# Patient Record
Sex: FEMALE | Race: WHITE | Employment: OTHER | URBAN - METROPOLITAN AREA
[De-identification: names, ages, dates, MRNs, and addresses within clinical notes are randomized per-mention and may not be internally consistent; named-entity substitution may affect disease eponyms.]

---

## 2017-03-02 ENCOUNTER — HOSPITAL ENCOUNTER (OUTPATIENT)
Dept: WOMENS IMAGING | Age: 62
Discharge: HOME | End: 2017-03-02
Payer: MEDICARE

## 2017-03-02 DIAGNOSIS — Z12.39 BREAST CANCER SCREENING: ICD-10-CM

## 2017-03-02 PROCEDURE — G0202 SCR MAMMO BI INCL CAD: HCPCS

## 2017-03-28 ENCOUNTER — HOSPITAL ENCOUNTER (OUTPATIENT)
Age: 62
Discharge: HOME | End: 2017-03-28
Payer: MEDICARE

## 2017-03-28 DIAGNOSIS — G62.9 NEUROPATHY: ICD-10-CM

## 2017-03-28 DIAGNOSIS — I10 ESSENTIAL HYPERTENSION: ICD-10-CM

## 2017-03-28 DIAGNOSIS — N18.30 CKD (CHRONIC KIDNEY DISEASE) STAGE 3, GFR 30-59 ML/MIN (HCC): ICD-10-CM

## 2017-03-28 LAB
ABSOLUTE EOS #: 0.2 K/UL (ref 0–0.4)
ABSOLUTE LYMPH #: 1.6 K/UL (ref 1–4.8)
ABSOLUTE MONO #: 0.4 K/UL (ref 0.1–1.3)
ANION GAP SERPL CALCULATED.3IONS-SCNC: 11 MMOL/L (ref 9–17)
ANION GAP SERPL CALCULATED.3IONS-SCNC: 11 MMOL/L (ref 9–17)
BASOPHILS # BLD: 1 % (ref 0–2)
BASOPHILS ABSOLUTE: 0.1 K/UL (ref 0–0.2)
BUN BLDV-MCNC: 23 MG/DL (ref 8–23)
BUN BLDV-MCNC: 23 MG/DL (ref 8–23)
BUN/CREAT BLD: ABNORMAL (ref 9–20)
BUN/CREAT BLD: ABNORMAL (ref 9–20)
CALCIUM SERPL-MCNC: 9.9 MG/DL (ref 8.6–10.4)
CALCIUM SERPL-MCNC: 9.9 MG/DL (ref 8.6–10.4)
CHLORIDE BLD-SCNC: 100 MMOL/L (ref 98–107)
CHLORIDE BLD-SCNC: 100 MMOL/L (ref 98–107)
CO2: 30 MMOL/L (ref 20–31)
CO2: 30 MMOL/L (ref 20–31)
CREAT SERPL-MCNC: 1.39 MG/DL (ref 0.5–0.9)
CREAT SERPL-MCNC: 1.39 MG/DL (ref 0.5–0.9)
DIFFERENTIAL TYPE: ABNORMAL
EOSINOPHILS RELATIVE PERCENT: 3 % (ref 0–4)
GFR AFRICAN AMERICAN: 47 ML/MIN
GFR AFRICAN AMERICAN: 47 ML/MIN
GFR NON-AFRICAN AMERICAN: 39 ML/MIN
GFR NON-AFRICAN AMERICAN: 39 ML/MIN
GFR SERPL CREATININE-BSD FRML MDRD: ABNORMAL ML/MIN/{1.73_M2}
GLUCOSE BLD-MCNC: 91 MG/DL (ref 70–99)
GLUCOSE BLD-MCNC: 91 MG/DL (ref 70–99)
HCT VFR BLD CALC: 41.4 % (ref 36–46)
HEMOGLOBIN: 13.4 G/DL (ref 12–16)
LYMPHOCYTES # BLD: 24 % (ref 24–44)
MAGNESIUM: 1.8 MG/DL (ref 1.6–2.6)
MCH RBC QN AUTO: 27.4 PG (ref 26–34)
MCHC RBC AUTO-ENTMCNC: 32.3 G/DL (ref 31–37)
MCV RBC AUTO: 84.9 FL (ref 80–100)
MONOCYTES # BLD: 6 % (ref 1–7)
PDW BLD-RTO: 15.6 % (ref 11.5–14.9)
PHOSPHORUS: 2.9 MG/DL (ref 2.6–4.5)
PLATELET # BLD: 182 K/UL (ref 150–450)
PLATELET ESTIMATE: ABNORMAL
PMV BLD AUTO: 8.8 FL (ref 6–12)
POTASSIUM SERPL-SCNC: 5.2 MMOL/L (ref 3.7–5.3)
POTASSIUM SERPL-SCNC: 5.2 MMOL/L (ref 3.7–5.3)
RBC # BLD: 4.88 M/UL (ref 4–5.2)
RBC # BLD: ABNORMAL 10*6/UL
SEG NEUTROPHILS: 66 % (ref 36–66)
SEGMENTED NEUTROPHILS ABSOLUTE COUNT: 4.4 K/UL (ref 1.3–9.1)
SODIUM BLD-SCNC: 141 MMOL/L (ref 135–144)
SODIUM BLD-SCNC: 141 MMOL/L (ref 135–144)
WBC # BLD: 6.6 K/UL (ref 3.5–11)
WBC # BLD: ABNORMAL 10*3/UL

## 2017-03-28 PROCEDURE — 80048 BASIC METABOLIC PNL TOTAL CA: CPT

## 2017-03-28 PROCEDURE — 85025 COMPLETE CBC W/AUTO DIFF WBC: CPT

## 2017-03-28 PROCEDURE — 36415 COLL VENOUS BLD VENIPUNCTURE: CPT

## 2017-03-28 PROCEDURE — 83735 ASSAY OF MAGNESIUM: CPT

## 2017-03-28 PROCEDURE — 84100 ASSAY OF PHOSPHORUS: CPT

## 2017-04-07 ENCOUNTER — HOSPITAL ENCOUNTER (OUTPATIENT)
Age: 62
Discharge: HOME | End: 2017-04-07
Payer: MEDICARE

## 2017-04-07 ENCOUNTER — OFFICE VISIT (OUTPATIENT)
Dept: OBGYN CLINIC | Age: 62
End: 2017-04-07
Payer: MEDICARE

## 2017-04-07 VITALS
RESPIRATION RATE: 16 BRPM | HEIGHT: 67 IN | HEART RATE: 78 BPM | SYSTOLIC BLOOD PRESSURE: 132 MMHG | WEIGHT: 270.95 LBS | DIASTOLIC BLOOD PRESSURE: 86 MMHG | BODY MASS INDEX: 42.53 KG/M2

## 2017-04-07 DIAGNOSIS — N95.0 PMB (POSTMENOPAUSAL BLEEDING): ICD-10-CM

## 2017-04-07 DIAGNOSIS — Z11.51 SPECIAL SCREENING EXAMINATION FOR HUMAN PAPILLOMAVIRUS (HPV): ICD-10-CM

## 2017-04-07 DIAGNOSIS — Z01.419 WELL WOMAN EXAM WITH ROUTINE GYNECOLOGICAL EXAM: Primary | ICD-10-CM

## 2017-04-07 DIAGNOSIS — N76.0 ACUTE VAGINITIS: ICD-10-CM

## 2017-04-07 LAB
DIRECT EXAM: ABNORMAL
ESTRADIOL LEVEL: 7 PG/ML
FOLLICLE STIMULATING HORMONE: 60.6 U/L (ref 25.8–134.8)
Lab: ABNORMAL
SPECIMEN DESCRIPTION: ABNORMAL
SPECIMEN DESCRIPTION: ABNORMAL
STATUS: ABNORMAL

## 2017-04-07 PROCEDURE — 83001 ASSAY OF GONADOTROPIN (FSH): CPT

## 2017-04-07 PROCEDURE — G0145 SCR C/V CYTO,THINLAYER,RESCR: HCPCS

## 2017-04-07 PROCEDURE — 36415 COLL VENOUS BLD VENIPUNCTURE: CPT

## 2017-04-07 PROCEDURE — 87510 GARDNER VAG DNA DIR PROBE: CPT

## 2017-04-07 PROCEDURE — 99396 PREV VISIT EST AGE 40-64: CPT | Performed by: NURSE PRACTITIONER

## 2017-04-07 PROCEDURE — 87480 CANDIDA DNA DIR PROBE: CPT

## 2017-04-07 PROCEDURE — 87660 TRICHOMONAS VAGIN DIR PROBE: CPT

## 2017-04-07 PROCEDURE — 82670 ASSAY OF TOTAL ESTRADIOL: CPT

## 2017-04-07 PROCEDURE — 87624 HPV HI-RISK TYP POOLED RSLT: CPT

## 2017-04-07 RX ORDER — CLOTRIMAZOLE AND BETAMETHASONE DIPROPIONATE 10; .64 MG/G; MG/G
CREAM TOPICAL
Qty: 1 TUBE | Refills: 1 | Status: SHIPPED | OUTPATIENT
Start: 2017-04-07 | End: 2018-01-03 | Stop reason: ALTCHOICE

## 2017-04-07 ASSESSMENT — PATIENT HEALTH QUESTIONNAIRE - PHQ9
2. FEELING DOWN, DEPRESSED OR HOPELESS: 0
SUM OF ALL RESPONSES TO PHQ QUESTIONS 1-9: 0
1. LITTLE INTEREST OR PLEASURE IN DOING THINGS: 0
SUM OF ALL RESPONSES TO PHQ9 QUESTIONS 1 & 2: 0

## 2017-04-10 LAB
HPV SAMPLE: NORMAL
HPV SOURCE: NORMAL
HPV, GENOTYPE 16: NOT DETECTED
HPV, GENOTYPE 18: NOT DETECTED
HPV, HIGH RISK OTHER: NOT DETECTED
HPV, INTERPRETATION: NORMAL

## 2017-04-10 RX ORDER — FLUCONAZOLE 150 MG/1
150 TABLET ORAL ONCE
Qty: 2 TABLET | Refills: 0 | Status: SHIPPED | OUTPATIENT
Start: 2017-04-10 | End: 2017-04-10

## 2017-04-10 RX ORDER — METRONIDAZOLE 500 MG/1
500 TABLET ORAL 2 TIMES DAILY
Qty: 14 TABLET | Refills: 0 | Status: SHIPPED | OUTPATIENT
Start: 2017-04-10 | End: 2017-04-17

## 2017-04-11 LAB — CYTOLOGY REPORT: NORMAL

## 2017-04-12 ENCOUNTER — HOSPITAL ENCOUNTER (OUTPATIENT)
Dept: ULTRASOUND IMAGING | Age: 62
Discharge: HOME | End: 2017-04-12
Payer: MEDICARE

## 2017-04-12 DIAGNOSIS — N95.0 PMB (POSTMENOPAUSAL BLEEDING): ICD-10-CM

## 2017-04-12 DIAGNOSIS — N18.30 CKD (CHRONIC KIDNEY DISEASE) STAGE 3, GFR 30-59 ML/MIN (HCC): ICD-10-CM

## 2017-04-12 DIAGNOSIS — I12.9 BENIGN HYPERTENSION WITH CKD (CHRONIC KIDNEY DISEASE) STAGE III (HCC): ICD-10-CM

## 2017-04-12 DIAGNOSIS — N18.30 BENIGN HYPERTENSION WITH CKD (CHRONIC KIDNEY DISEASE) STAGE III (HCC): ICD-10-CM

## 2017-04-12 PROCEDURE — 76775 US EXAM ABDO BACK WALL LIM: CPT

## 2017-04-12 PROCEDURE — 76856 US EXAM PELVIC COMPLETE: CPT

## 2017-04-12 PROCEDURE — 76830 TRANSVAGINAL US NON-OB: CPT

## 2017-04-17 ENCOUNTER — HOSPITAL ENCOUNTER (OUTPATIENT)
Age: 62
Discharge: HOME | End: 2017-04-17
Payer: MEDICARE

## 2017-04-17 DIAGNOSIS — I12.9 BENIGN HYPERTENSION WITH CKD (CHRONIC KIDNEY DISEASE) STAGE III (HCC): ICD-10-CM

## 2017-04-17 DIAGNOSIS — N18.30 BENIGN HYPERTENSION WITH CKD (CHRONIC KIDNEY DISEASE) STAGE III (HCC): ICD-10-CM

## 2017-04-17 DIAGNOSIS — N18.30 CKD (CHRONIC KIDNEY DISEASE) STAGE 3, GFR 30-59 ML/MIN (HCC): ICD-10-CM

## 2017-04-17 LAB
ANION GAP SERPL CALCULATED.3IONS-SCNC: 11 MMOL/L (ref 9–17)
CHLORIDE BLD-SCNC: 100 MMOL/L (ref 98–107)
CO2: 29 MMOL/L (ref 20–31)
POTASSIUM SERPL-SCNC: 4.5 MMOL/L (ref 3.7–5.3)
SODIUM BLD-SCNC: 140 MMOL/L (ref 135–144)

## 2017-04-17 PROCEDURE — 80051 ELECTROLYTE PANEL: CPT

## 2017-04-17 PROCEDURE — 36415 COLL VENOUS BLD VENIPUNCTURE: CPT

## 2017-05-11 ENCOUNTER — OFFICE VISIT (OUTPATIENT)
Dept: OBGYN CLINIC | Age: 62
End: 2017-05-11
Payer: MEDICARE

## 2017-05-11 VITALS
WEIGHT: 264 LBS | SYSTOLIC BLOOD PRESSURE: 128 MMHG | HEIGHT: 67 IN | BODY MASS INDEX: 41.44 KG/M2 | DIASTOLIC BLOOD PRESSURE: 82 MMHG | HEART RATE: 78 BPM

## 2017-05-11 DIAGNOSIS — Z98.51 S/P TUBAL LIGATION: ICD-10-CM

## 2017-05-11 DIAGNOSIS — N95.0 PMB (POSTMENOPAUSAL BLEEDING): Primary | ICD-10-CM

## 2017-05-11 DIAGNOSIS — I10 ESSENTIAL HYPERTENSION: ICD-10-CM

## 2017-05-11 DIAGNOSIS — R93.89 ENDOMETRIAL THICKENING ON ULTRA SOUND: ICD-10-CM

## 2017-05-11 DIAGNOSIS — Z13.1 DM (DIABETES MELLITUS SCREEN): ICD-10-CM

## 2017-05-11 DIAGNOSIS — I25.2 HISTORY OF MYOCARDIAL INFARCTION: ICD-10-CM

## 2017-05-11 PROCEDURE — 99213 OFFICE O/P EST LOW 20 MIN: CPT | Performed by: OBSTETRICS & GYNECOLOGY

## 2017-06-08 ENCOUNTER — OFFICE VISIT (OUTPATIENT)
Dept: INTERNAL MEDICINE CLINIC | Age: 62
End: 2017-06-08
Payer: MEDICARE

## 2017-06-08 VITALS
HEART RATE: 79 BPM | DIASTOLIC BLOOD PRESSURE: 70 MMHG | HEIGHT: 67 IN | BODY MASS INDEX: 40.02 KG/M2 | WEIGHT: 255 LBS | SYSTOLIC BLOOD PRESSURE: 114 MMHG

## 2017-06-08 DIAGNOSIS — E11.42 DIABETIC POLYNEUROPATHY ASSOCIATED WITH TYPE 2 DIABETES MELLITUS (HCC): ICD-10-CM

## 2017-06-08 DIAGNOSIS — E66.01 MORBID OBESITY DUE TO EXCESS CALORIES (HCC): ICD-10-CM

## 2017-06-08 DIAGNOSIS — M54.41 CHRONIC MIDLINE LOW BACK PAIN WITH RIGHT-SIDED SCIATICA: ICD-10-CM

## 2017-06-08 DIAGNOSIS — Z98.61 CAD S/P PERCUTANEOUS CORONARY ANGIOPLASTY: ICD-10-CM

## 2017-06-08 DIAGNOSIS — G89.29 CHRONIC MIDLINE LOW BACK PAIN WITH RIGHT-SIDED SCIATICA: ICD-10-CM

## 2017-06-08 DIAGNOSIS — N18.30 CKD (CHRONIC KIDNEY DISEASE) STAGE 3, GFR 30-59 ML/MIN (HCC): ICD-10-CM

## 2017-06-08 DIAGNOSIS — I10 ESSENTIAL HYPERTENSION: Primary | ICD-10-CM

## 2017-06-08 DIAGNOSIS — Z13.9 SCREENING: ICD-10-CM

## 2017-06-08 DIAGNOSIS — I25.10 CAD S/P PERCUTANEOUS CORONARY ANGIOPLASTY: ICD-10-CM

## 2017-06-08 DIAGNOSIS — E11.9 TYPE 2 DIABETES MELLITUS WITHOUT COMPLICATION, WITHOUT LONG-TERM CURRENT USE OF INSULIN (HCC): ICD-10-CM

## 2017-06-08 PROCEDURE — 99204 OFFICE O/P NEW MOD 45 MIN: CPT | Performed by: INTERNAL MEDICINE

## 2017-06-08 RX ORDER — OXYCODONE HYDROCHLORIDE 10 MG/1
10 TABLET ORAL
COMMUNITY
Start: 2017-05-15 | End: 2017-06-08 | Stop reason: SDUPTHER

## 2017-06-08 RX ORDER — METOPROLOL TARTRATE 50 MG/1
TABLET, FILM COATED ORAL
COMMUNITY
End: 2017-06-08 | Stop reason: SDUPTHER

## 2017-06-08 RX ORDER — FENTANYL 50 UG/H
1 PATCH TRANSDERMAL
COMMUNITY
Start: 2017-05-15 | End: 2017-07-13 | Stop reason: ALTCHOICE

## 2017-06-08 RX ORDER — CALCIUM CITRATE/VITAMIN D3 200MG-6.25
TABLET ORAL
COMMUNITY
Start: 2017-05-31 | End: 2019-05-20

## 2017-06-08 RX ORDER — GLUCOSAM/CHON-MSM1/C/MANG/BOSW 500-416.6
TABLET ORAL
COMMUNITY
Start: 2017-04-10 | End: 2019-05-20

## 2017-06-08 RX ORDER — VALSARTAN AND HYDROCHLOROTHIAZIDE 160; 25 MG/1; MG/1
0.5 TABLET ORAL
COMMUNITY
End: 2017-06-08 | Stop reason: SDUPTHER

## 2017-06-08 RX ORDER — DULOXETIN HYDROCHLORIDE 30 MG/1
CAPSULE, DELAYED RELEASE ORAL
COMMUNITY
Start: 2017-05-22 | End: 2017-06-08 | Stop reason: SDUPTHER

## 2017-06-08 ASSESSMENT — PATIENT HEALTH QUESTIONNAIRE - PHQ9
SUM OF ALL RESPONSES TO PHQ9 QUESTIONS 1 & 2: 0
1. LITTLE INTEREST OR PLEASURE IN DOING THINGS: 0
SUM OF ALL RESPONSES TO PHQ QUESTIONS 1-9: 0
2. FEELING DOWN, DEPRESSED OR HOPELESS: 0

## 2017-06-08 ASSESSMENT — ENCOUNTER SYMPTOMS
APNEA: 0
CONSTIPATION: 0
SHORTNESS OF BREATH: 0
COLOR CHANGE: 0
DIARRHEA: 0
EYE ITCHING: 0
CHEST TIGHTNESS: 0
EYE DISCHARGE: 0
EYE PAIN: 0
EYE REDNESS: 0
CHOKING: 0
BACK PAIN: 0
COUGH: 0
ANAL BLEEDING: 0
BLOOD IN STOOL: 0
ABDOMINAL DISTENTION: 0

## 2017-06-29 ENCOUNTER — PREP FOR PROCEDURE (OUTPATIENT)
Dept: OBGYN CLINIC | Age: 62
End: 2017-06-29

## 2017-06-29 ENCOUNTER — OFFICE VISIT (OUTPATIENT)
Dept: OBGYN CLINIC | Age: 62
End: 2017-06-29
Payer: MEDICARE

## 2017-06-29 VITALS
BODY MASS INDEX: 41.44 KG/M2 | SYSTOLIC BLOOD PRESSURE: 122 MMHG | HEART RATE: 78 BPM | DIASTOLIC BLOOD PRESSURE: 74 MMHG | WEIGHT: 264 LBS | HEIGHT: 67 IN

## 2017-06-29 DIAGNOSIS — I25.2 HISTORY OF MYOCARDIAL INFARCTION: ICD-10-CM

## 2017-06-29 DIAGNOSIS — R93.89 ENDOMETRIAL THICKENING ON ULTRA SOUND: ICD-10-CM

## 2017-06-29 DIAGNOSIS — Z01.818 PREOP TESTING: Primary | ICD-10-CM

## 2017-06-29 DIAGNOSIS — Z13.1 DM (DIABETES MELLITUS SCREEN): ICD-10-CM

## 2017-06-29 DIAGNOSIS — I10 ESSENTIAL HYPERTENSION: ICD-10-CM

## 2017-06-29 DIAGNOSIS — N95.0 PMB (POSTMENOPAUSAL BLEEDING): Primary | ICD-10-CM

## 2017-06-29 DIAGNOSIS — Z98.51 S/P TUBAL LIGATION: ICD-10-CM

## 2017-06-29 DIAGNOSIS — I25.10 CAD S/P PERCUTANEOUS CORONARY ANGIOPLASTY: ICD-10-CM

## 2017-06-29 DIAGNOSIS — Z98.61 CAD S/P PERCUTANEOUS CORONARY ANGIOPLASTY: ICD-10-CM

## 2017-06-29 PROCEDURE — 99213 OFFICE O/P EST LOW 20 MIN: CPT | Performed by: OBSTETRICS & GYNECOLOGY

## 2017-06-29 RX ORDER — SODIUM CHLORIDE, SODIUM LACTATE, POTASSIUM CHLORIDE, CALCIUM CHLORIDE 600; 310; 30; 20 MG/100ML; MG/100ML; MG/100ML; MG/100ML
INJECTION, SOLUTION INTRAVENOUS CONTINUOUS
Status: CANCELLED | OUTPATIENT
Start: 2017-06-29

## 2017-06-29 RX ORDER — SODIUM CHLORIDE 0.9 % (FLUSH) 0.9 %
10 SYRINGE (ML) INJECTION EVERY 12 HOURS SCHEDULED
Status: CANCELLED | OUTPATIENT
Start: 2017-06-29

## 2017-06-29 RX ORDER — SODIUM CHLORIDE 0.9 % (FLUSH) 0.9 %
10 SYRINGE (ML) INJECTION PRN
Status: CANCELLED | OUTPATIENT
Start: 2017-06-29

## 2017-07-11 ENCOUNTER — HOSPITAL ENCOUNTER (OUTPATIENT)
Age: 62
Discharge: HOME | End: 2017-07-11
Payer: MEDICARE

## 2017-07-11 DIAGNOSIS — N18.30 BENIGN HYPERTENSION WITH CKD (CHRONIC KIDNEY DISEASE) STAGE III (HCC): ICD-10-CM

## 2017-07-11 DIAGNOSIS — N18.30 CKD (CHRONIC KIDNEY DISEASE) STAGE 3, GFR 30-59 ML/MIN (HCC): ICD-10-CM

## 2017-07-11 DIAGNOSIS — I12.9 BENIGN HYPERTENSION WITH CKD (CHRONIC KIDNEY DISEASE) STAGE III (HCC): ICD-10-CM

## 2017-07-11 LAB
ABSOLUTE EOS #: 0.2 K/UL (ref 0–0.4)
ABSOLUTE LYMPH #: 1.7 K/UL (ref 1–4.8)
ABSOLUTE MONO #: 0.5 K/UL (ref 0.1–1.3)
ANION GAP SERPL CALCULATED.3IONS-SCNC: 14 MMOL/L (ref 9–17)
BASOPHILS # BLD: 1 %
BASOPHILS ABSOLUTE: 0.1 K/UL (ref 0–0.2)
BUN BLDV-MCNC: 22 MG/DL (ref 8–23)
BUN/CREAT BLD: ABNORMAL (ref 9–20)
CALCIUM SERPL-MCNC: 10.3 MG/DL (ref 8.6–10.4)
CHLORIDE BLD-SCNC: 99 MMOL/L (ref 98–107)
CO2: 26 MMOL/L (ref 20–31)
CREAT SERPL-MCNC: 1.42 MG/DL (ref 0.5–0.9)
CREATININE URINE: 306.8 MG/DL (ref 28–217)
DIFFERENTIAL TYPE: NORMAL
EOSINOPHILS RELATIVE PERCENT: 3 %
FREE KAPPA/LAMBDA RATIO: 1.73 (ref 0.26–1.65)
GFR AFRICAN AMERICAN: 46 ML/MIN
GFR NON-AFRICAN AMERICAN: 38 ML/MIN
GFR SERPL CREATININE-BSD FRML MDRD: ABNORMAL ML/MIN/{1.73_M2}
GFR SERPL CREATININE-BSD FRML MDRD: ABNORMAL ML/MIN/{1.73_M2}
GLUCOSE BLD-MCNC: 117 MG/DL (ref 70–99)
HCT VFR BLD CALC: 40.5 % (ref 36–46)
HEMOGLOBIN: 13.2 G/DL (ref 12–16)
KAPPA FREE LIGHT CHAINS QNT: 3.53 MG/DL (ref 0.37–1.94)
LAMBDA FREE LIGHT CHAINS QNT: 2.04 MG/DL (ref 0.57–2.63)
LYMPHOCYTES # BLD: 24 %
MAGNESIUM: 2 MG/DL (ref 1.6–2.6)
MCH RBC QN AUTO: 29.1 PG (ref 26–34)
MCHC RBC AUTO-ENTMCNC: 32.5 G/DL (ref 31–37)
MCV RBC AUTO: 89.6 FL (ref 80–100)
MONOCYTES # BLD: 7 %
PDW BLD-RTO: 14.7 % (ref 11.5–14.9)
PHOSPHORUS: 3.1 MG/DL (ref 2.6–4.5)
PLATELET # BLD: 208 K/UL (ref 150–450)
PLATELET ESTIMATE: NORMAL
PMV BLD AUTO: 9 FL (ref 6–12)
POTASSIUM SERPL-SCNC: 4 MMOL/L (ref 3.7–5.3)
RBC # BLD: 4.52 M/UL (ref 4–5.2)
RBC # BLD: NORMAL 10*6/UL
SEG NEUTROPHILS: 65 %
SEGMENTED NEUTROPHILS ABSOLUTE COUNT: 4.7 K/UL (ref 1.3–9.1)
SODIUM BLD-SCNC: 139 MMOL/L (ref 135–144)
TOTAL PROTEIN, URINE: 41 MG/DL
URINE TOTAL PROTEIN CREATININE RATIO: 0.13 (ref 0–0.2)
WBC # BLD: 7.2 K/UL (ref 3.5–11)
WBC # BLD: NORMAL 10*3/UL

## 2017-07-11 PROCEDURE — 86235 NUCLEAR ANTIGEN ANTIBODY: CPT

## 2017-07-11 PROCEDURE — 86225 DNA ANTIBODY NATIVE: CPT

## 2017-07-11 PROCEDURE — 82570 ASSAY OF URINE CREATININE: CPT

## 2017-07-11 PROCEDURE — 80048 BASIC METABOLIC PNL TOTAL CA: CPT

## 2017-07-11 PROCEDURE — 83883 ASSAY NEPHELOMETRY NOT SPEC: CPT

## 2017-07-11 PROCEDURE — 84165 PROTEIN E-PHORESIS SERUM: CPT

## 2017-07-11 PROCEDURE — 84156 ASSAY OF PROTEIN URINE: CPT

## 2017-07-11 PROCEDURE — 85025 COMPLETE CBC W/AUTO DIFF WBC: CPT

## 2017-07-11 PROCEDURE — 84100 ASSAY OF PHOSPHORUS: CPT

## 2017-07-11 PROCEDURE — 36415 COLL VENOUS BLD VENIPUNCTURE: CPT

## 2017-07-11 PROCEDURE — 86038 ANTINUCLEAR ANTIBODIES: CPT

## 2017-07-11 PROCEDURE — 83735 ASSAY OF MAGNESIUM: CPT

## 2017-07-11 PROCEDURE — 84155 ASSAY OF PROTEIN SERUM: CPT

## 2017-07-12 LAB
ANA REFERENCE RANGE:: ABNORMAL
ANTI DNA DOUBLE STRANDED: 8 IU/ML
ANTI JO-1 IGG: 7 U/ML
ANTI RNP AB: 9 U/ML
ANTI SSA: 37 U/ML
ANTI SSB: 4 U/ML
ANTI-CENTROMERE: 6 U/ML
ANTI-NUCLEAR ANTIBODY (ANA): POSITIVE
ANTI-SCLERODERMA: 136 U/ML
ANTI-SMITH: 10 U/ML
HISTONE ANTIBODY: 8 U/ML

## 2017-07-13 ENCOUNTER — HOSPITAL ENCOUNTER (OUTPATIENT)
Dept: PREADMISSION TESTING | Age: 62
Discharge: HOME | End: 2017-07-13
Payer: MEDICARE

## 2017-07-13 VITALS
TEMPERATURE: 96.4 F | HEART RATE: 54 BPM | DIASTOLIC BLOOD PRESSURE: 68 MMHG | WEIGHT: 252 LBS | HEIGHT: 67 IN | RESPIRATION RATE: 18 BRPM | BODY MASS INDEX: 39.55 KG/M2 | OXYGEN SATURATION: 100 % | SYSTOLIC BLOOD PRESSURE: 106 MMHG

## 2017-07-13 DIAGNOSIS — Z01.818 PREOP TESTING: ICD-10-CM

## 2017-07-13 LAB
-: ABNORMAL
ABO/RH: NORMAL
ABSOLUTE EOS #: 0.3 K/UL (ref 0–0.4)
ABSOLUTE LYMPH #: 1.9 K/UL (ref 1–4.8)
ABSOLUTE MONO #: 0.6 K/UL (ref 0.1–1.3)
ALBUMIN (CALCULATED): 4.5 G/DL (ref 3.2–5.2)
ALBUMIN PERCENT: 65 % (ref 45–65)
ALPHA 1 PERCENT: 2 % (ref 3–6)
ALPHA 2 PERCENT: 10 % (ref 6–13)
ALPHA-1-GLOBULIN: 0.2 G/DL (ref 0.1–0.4)
ALPHA-2-GLOBULIN: 0.7 G/DL (ref 0.5–0.9)
AMORPHOUS: ABNORMAL
ANION GAP SERPL CALCULATED.3IONS-SCNC: 11 MMOL/L (ref 9–17)
ANTIBODY SCREEN: NEGATIVE
ARM BAND NUMBER: NORMAL
BACTERIA: ABNORMAL
BASOPHILS # BLD: 1 %
BASOPHILS ABSOLUTE: 0.1 K/UL (ref 0–0.2)
BETA GLOBULIN: 0.7 G/DL (ref 0.5–1.1)
BETA PERCENT: 11 % (ref 11–19)
BILIRUBIN URINE: ABNORMAL
BUN BLDV-MCNC: 23 MG/DL (ref 8–23)
BUN/CREAT BLD: ABNORMAL (ref 9–20)
CALCIUM SERPL-MCNC: 10.1 MG/DL (ref 8.6–10.4)
CASTS UA: ABNORMAL /LPF
CHLORIDE BLD-SCNC: 101 MMOL/L (ref 98–107)
CO2: 29 MMOL/L (ref 20–31)
COLOR: ABNORMAL
COMMENT UA: ABNORMAL
CREAT SERPL-MCNC: 1.36 MG/DL (ref 0.5–0.9)
CRYSTALS, UA: ABNORMAL /HPF
DIFFERENTIAL TYPE: ABNORMAL
EOSINOPHILS RELATIVE PERCENT: 3 %
EPITHELIAL CELLS UA: ABNORMAL /HPF
EXPIRATION DATE: NORMAL
GAMMA GLOBULIN %: 13 % (ref 9–20)
GAMMA GLOBULIN: 0.9 G/DL (ref 0.5–1.5)
GFR AFRICAN AMERICAN: 48 ML/MIN
GFR NON-AFRICAN AMERICAN: 40 ML/MIN
GFR SERPL CREATININE-BSD FRML MDRD: ABNORMAL ML/MIN/{1.73_M2}
GFR SERPL CREATININE-BSD FRML MDRD: ABNORMAL ML/MIN/{1.73_M2}
GLUCOSE BLD-MCNC: 105 MG/DL (ref 70–99)
GLUCOSE URINE: NEGATIVE
HCG QUANTITATIVE: 5 IU/L
HCT VFR BLD CALC: 39.8 % (ref 36–46)
HEMOGLOBIN: 13 G/DL (ref 12–16)
KETONES, URINE: ABNORMAL
LEUKOCYTE ESTERASE, URINE: ABNORMAL
LYMPHOCYTES # BLD: 24 %
MCH RBC QN AUTO: 29.2 PG (ref 26–34)
MCHC RBC AUTO-ENTMCNC: 32.6 G/DL (ref 31–37)
MCV RBC AUTO: 89.6 FL (ref 80–100)
MONOCYTES # BLD: 7 %
MUCUS: ABNORMAL
NITRITE, URINE: NEGATIVE
OTHER OBSERVATIONS UA: ABNORMAL
PATHOLOGIST: ABNORMAL
PDW BLD-RTO: 15.2 % (ref 11.5–14.9)
PH UA: 5 (ref 5–8)
PLATELET # BLD: 227 K/UL (ref 150–450)
PLATELET ESTIMATE: ABNORMAL
PMV BLD AUTO: 9.3 FL (ref 6–12)
POTASSIUM SERPL-SCNC: 4.4 MMOL/L (ref 3.7–5.3)
PROTEIN ELECTROPHORESIS, SERUM: ABNORMAL
PROTEIN UA: ABNORMAL
RBC # BLD: 4.44 M/UL (ref 4–5.2)
RBC # BLD: ABNORMAL 10*6/UL
RBC UA: ABNORMAL /HPF
RENAL EPITHELIAL, UA: ABNORMAL /HPF
SEG NEUTROPHILS: 65 %
SEGMENTED NEUTROPHILS ABSOLUTE COUNT: 5.2 K/UL (ref 1.3–9.1)
SODIUM BLD-SCNC: 141 MMOL/L (ref 135–144)
SPECIFIC GRAVITY UA: 1.03 (ref 1–1.03)
TOTAL PROT. SUM,%: 101 % (ref 98–102)
TOTAL PROT. SUM: 7 G/DL (ref 6.3–8.2)
TOTAL PROTEIN: 7 G/DL (ref 6.4–8.3)
TRICHOMONAS: ABNORMAL
TURBIDITY: ABNORMAL
URINE HGB: NEGATIVE
UROBILINOGEN, URINE: NORMAL
WBC # BLD: 7.9 K/UL (ref 3.5–11)
WBC # BLD: ABNORMAL 10*3/UL
WBC UA: ABNORMAL /HPF
YEAST: ABNORMAL

## 2017-07-13 PROCEDURE — 87086 URINE CULTURE/COLONY COUNT: CPT

## 2017-07-13 PROCEDURE — 80048 BASIC METABOLIC PNL TOTAL CA: CPT

## 2017-07-13 PROCEDURE — 36415 COLL VENOUS BLD VENIPUNCTURE: CPT

## 2017-07-13 PROCEDURE — 87077 CULTURE AEROBIC IDENTIFY: CPT

## 2017-07-13 PROCEDURE — 86901 BLOOD TYPING SEROLOGIC RH(D): CPT

## 2017-07-13 PROCEDURE — 84702 CHORIONIC GONADOTROPIN TEST: CPT

## 2017-07-13 PROCEDURE — 86900 BLOOD TYPING SEROLOGIC ABO: CPT

## 2017-07-13 PROCEDURE — 81001 URINALYSIS AUTO W/SCOPE: CPT

## 2017-07-13 PROCEDURE — 86850 RBC ANTIBODY SCREEN: CPT

## 2017-07-13 PROCEDURE — 87186 SC STD MICRODIL/AGAR DIL: CPT

## 2017-07-13 PROCEDURE — 85025 COMPLETE CBC W/AUTO DIFF WBC: CPT

## 2017-07-14 ENCOUNTER — TELEPHONE (OUTPATIENT)
Dept: OBGYN CLINIC | Age: 62
End: 2017-07-14

## 2017-07-14 ENCOUNTER — ANESTHESIA EVENT (OUTPATIENT)
Dept: OPERATING ROOM | Age: 62
End: 2017-07-14
Payer: MEDICARE

## 2017-07-14 RX ORDER — CLINDAMYCIN HYDROCHLORIDE 300 MG/1
300 CAPSULE ORAL 3 TIMES DAILY
Qty: 30 CAPSULE | Refills: 0 | Status: ON HOLD | OUTPATIENT
Start: 2017-07-14 | End: 2017-07-17 | Stop reason: HOSPADM

## 2017-07-14 RX ORDER — SODIUM CHLORIDE 0.9 % (FLUSH) 0.9 %
10 SYRINGE (ML) INJECTION PRN
Status: CANCELLED | OUTPATIENT
Start: 2017-07-14

## 2017-07-14 RX ORDER — SODIUM CHLORIDE 9 MG/ML
INJECTION, SOLUTION INTRAVENOUS CONTINUOUS
Status: CANCELLED | OUTPATIENT
Start: 2017-07-14

## 2017-07-14 RX ORDER — SODIUM CHLORIDE, SODIUM LACTATE, POTASSIUM CHLORIDE, CALCIUM CHLORIDE 600; 310; 30; 20 MG/100ML; MG/100ML; MG/100ML; MG/100ML
INJECTION, SOLUTION INTRAVENOUS CONTINUOUS
Status: CANCELLED | OUTPATIENT
Start: 2017-07-14

## 2017-07-14 RX ORDER — SODIUM CHLORIDE 0.9 % (FLUSH) 0.9 %
10 SYRINGE (ML) INJECTION EVERY 12 HOURS SCHEDULED
Status: CANCELLED | OUTPATIENT
Start: 2017-07-14

## 2017-07-14 RX ORDER — LIDOCAINE HYDROCHLORIDE 10 MG/ML
1 INJECTION, SOLUTION EPIDURAL; INFILTRATION; INTRACAUDAL; PERINEURAL
Status: CANCELLED | OUTPATIENT
Start: 2017-07-14 | End: 2017-07-14

## 2017-07-15 LAB
CULTURE: ABNORMAL
CULTURE: ABNORMAL
Lab: ABNORMAL
ORGANISM: ABNORMAL
SPECIMEN DESCRIPTION: ABNORMAL
SPECIMEN DESCRIPTION: ABNORMAL
STATUS: ABNORMAL

## 2017-07-17 ENCOUNTER — TELEPHONE (OUTPATIENT)
Dept: OBGYN CLINIC | Age: 62
End: 2017-07-17

## 2017-07-17 ENCOUNTER — HOSPITAL ENCOUNTER (OUTPATIENT)
Age: 62
Setting detail: OUTPATIENT SURGERY
Discharge: HOME | End: 2017-07-17
Attending: OBSTETRICS & GYNECOLOGY | Admitting: OBSTETRICS & GYNECOLOGY
Payer: MEDICARE

## 2017-07-17 ENCOUNTER — ANESTHESIA (OUTPATIENT)
Dept: OPERATING ROOM | Age: 62
End: 2017-07-17
Payer: MEDICARE

## 2017-07-17 VITALS
OXYGEN SATURATION: 98 % | HEART RATE: 64 BPM | HEIGHT: 67 IN | TEMPERATURE: 97 F | SYSTOLIC BLOOD PRESSURE: 119 MMHG | WEIGHT: 252 LBS | RESPIRATION RATE: 12 BRPM | BODY MASS INDEX: 39.55 KG/M2 | DIASTOLIC BLOOD PRESSURE: 72 MMHG

## 2017-07-17 VITALS — SYSTOLIC BLOOD PRESSURE: 102 MMHG | OXYGEN SATURATION: 99 % | DIASTOLIC BLOOD PRESSURE: 58 MMHG

## 2017-07-17 PROBLEM — Z98.890 STATUS POST HYSTEROSCOPY: Status: ACTIVE | Noted: 2017-07-17

## 2017-07-17 PROBLEM — N39.0 UTI (URINARY TRACT INFECTION): Status: ACTIVE | Noted: 2017-07-17

## 2017-07-17 PROBLEM — D25.9 FIBROID UTERUS: Status: ACTIVE | Noted: 2017-07-17

## 2017-07-17 LAB — GLUCOSE BLD-MCNC: 105 MG/DL (ref 65–105)

## 2017-07-17 PROCEDURE — 58120 DILATION AND CURETTAGE: CPT | Performed by: OBSTETRICS & GYNECOLOGY

## 2017-07-17 PROCEDURE — 58561 HYSTEROSCOPY REMOVE MYOMA: CPT | Performed by: OBSTETRICS & GYNECOLOGY

## 2017-07-17 PROCEDURE — 3600000012 HC SURGERY LEVEL 2 ADDTL 15MIN: Performed by: OBSTETRICS & GYNECOLOGY

## 2017-07-17 PROCEDURE — 2500000003 HC RX 250 WO HCPCS: Performed by: NURSE ANESTHETIST, CERTIFIED REGISTERED

## 2017-07-17 PROCEDURE — 88305 TISSUE EXAM BY PATHOLOGIST: CPT

## 2017-07-17 PROCEDURE — 3700000001 HC ADD 15 MINUTES (ANESTHESIA): Performed by: OBSTETRICS & GYNECOLOGY

## 2017-07-17 PROCEDURE — 7100000030 HC ASPR PHASE II RECOVERY - FIRST 15 MIN: Performed by: OBSTETRICS & GYNECOLOGY

## 2017-07-17 PROCEDURE — 2720000010 HC SURG SUPPLY STERILE: Performed by: OBSTETRICS & GYNECOLOGY

## 2017-07-17 PROCEDURE — 2580000003 HC RX 258: Performed by: ANESTHESIOLOGY

## 2017-07-17 PROCEDURE — 3700000000 HC ANESTHESIA ATTENDED CARE: Performed by: OBSTETRICS & GYNECOLOGY

## 2017-07-17 PROCEDURE — 7100000031 HC ASPR PHASE II RECOVERY - ADDTL 15 MIN: Performed by: OBSTETRICS & GYNECOLOGY

## 2017-07-17 PROCEDURE — 2580000003 HC RX 258: Performed by: OBSTETRICS & GYNECOLOGY

## 2017-07-17 PROCEDURE — 7100000001 HC PACU RECOVERY - ADDTL 15 MIN: Performed by: OBSTETRICS & GYNECOLOGY

## 2017-07-17 PROCEDURE — 7100000000 HC PACU RECOVERY - FIRST 15 MIN: Performed by: OBSTETRICS & GYNECOLOGY

## 2017-07-17 PROCEDURE — 6360000002 HC RX W HCPCS: Performed by: NURSE ANESTHETIST, CERTIFIED REGISTERED

## 2017-07-17 PROCEDURE — 3600000002 HC SURGERY LEVEL 2 BASE: Performed by: OBSTETRICS & GYNECOLOGY

## 2017-07-17 PROCEDURE — 82947 ASSAY GLUCOSE BLOOD QUANT: CPT

## 2017-07-17 PROCEDURE — 2500000003 HC RX 250 WO HCPCS: Performed by: OBSTETRICS & GYNECOLOGY

## 2017-07-17 RX ORDER — DEXAMETHASONE SODIUM PHOSPHATE 4 MG/ML
INJECTION, SOLUTION INTRA-ARTICULAR; INTRALESIONAL; INTRAMUSCULAR; INTRAVENOUS; SOFT TISSUE PRN
Status: DISCONTINUED | OUTPATIENT
Start: 2017-07-17 | End: 2017-07-17 | Stop reason: SDUPTHER

## 2017-07-17 RX ORDER — MORPHINE SULFATE 2 MG/ML
1 INJECTION, SOLUTION INTRAMUSCULAR; INTRAVENOUS EVERY 5 MIN PRN
Status: DISCONTINUED | OUTPATIENT
Start: 2017-07-17 | End: 2017-07-17 | Stop reason: HOSPADM

## 2017-07-17 RX ORDER — MIDAZOLAM HYDROCHLORIDE 1 MG/ML
INJECTION INTRAMUSCULAR; INTRAVENOUS PRN
Status: DISCONTINUED | OUTPATIENT
Start: 2017-07-17 | End: 2017-07-17 | Stop reason: SDUPTHER

## 2017-07-17 RX ORDER — PROPOFOL 10 MG/ML
INJECTION, EMULSION INTRAVENOUS PRN
Status: DISCONTINUED | OUTPATIENT
Start: 2017-07-17 | End: 2017-07-17 | Stop reason: SDUPTHER

## 2017-07-17 RX ORDER — LABETALOL HYDROCHLORIDE 5 MG/ML
5 INJECTION, SOLUTION INTRAVENOUS EVERY 10 MIN PRN
Status: DISCONTINUED | OUTPATIENT
Start: 2017-07-17 | End: 2017-07-17 | Stop reason: HOSPADM

## 2017-07-17 RX ORDER — SODIUM CHLORIDE 0.9 % (FLUSH) 0.9 %
10 SYRINGE (ML) INJECTION PRN
Status: DISCONTINUED | OUTPATIENT
Start: 2017-07-17 | End: 2017-07-17 | Stop reason: HOSPADM

## 2017-07-17 RX ORDER — CLINDAMYCIN PHOSPHATE 150 MG/ML
INJECTION, SOLUTION INTRAVENOUS
Status: DISCONTINUED
Start: 2017-07-17 | End: 2017-07-17 | Stop reason: HOSPADM

## 2017-07-17 RX ORDER — CIPROFLOXACIN 500 MG/1
500 TABLET, FILM COATED ORAL 2 TIMES DAILY
Qty: 14 TABLET | Refills: 0 | Status: SHIPPED | OUTPATIENT
Start: 2017-07-17 | End: 2017-07-19 | Stop reason: ALTCHOICE

## 2017-07-17 RX ORDER — FENTANYL CITRATE 50 UG/ML
25 INJECTION, SOLUTION INTRAMUSCULAR; INTRAVENOUS EVERY 5 MIN PRN
Status: DISCONTINUED | OUTPATIENT
Start: 2017-07-17 | End: 2017-07-17 | Stop reason: HOSPADM

## 2017-07-17 RX ORDER — ONDANSETRON 2 MG/ML
INJECTION INTRAMUSCULAR; INTRAVENOUS PRN
Status: DISCONTINUED | OUTPATIENT
Start: 2017-07-17 | End: 2017-07-17 | Stop reason: SDUPTHER

## 2017-07-17 RX ORDER — SODIUM CHLORIDE 0.9 % (FLUSH) 0.9 %
10 SYRINGE (ML) INJECTION EVERY 12 HOURS SCHEDULED
Status: DISCONTINUED | OUTPATIENT
Start: 2017-07-17 | End: 2017-07-17 | Stop reason: HOSPADM

## 2017-07-17 RX ORDER — CIPROFLOXACIN 500 MG/1
500 TABLET, FILM COATED ORAL 2 TIMES DAILY
Qty: 20 TABLET | Refills: 0 | Status: SHIPPED | OUTPATIENT
Start: 2017-07-17 | End: 2017-07-27

## 2017-07-17 RX ORDER — FENTANYL CITRATE 50 UG/ML
INJECTION, SOLUTION INTRAMUSCULAR; INTRAVENOUS PRN
Status: DISCONTINUED | OUTPATIENT
Start: 2017-07-17 | End: 2017-07-17 | Stop reason: SDUPTHER

## 2017-07-17 RX ORDER — SODIUM CHLORIDE, SODIUM LACTATE, POTASSIUM CHLORIDE, CALCIUM CHLORIDE 600; 310; 30; 20 MG/100ML; MG/100ML; MG/100ML; MG/100ML
INJECTION, SOLUTION INTRAVENOUS CONTINUOUS
Status: DISCONTINUED | OUTPATIENT
Start: 2017-07-17 | End: 2017-07-17 | Stop reason: HOSPADM

## 2017-07-17 RX ORDER — METOCLOPRAMIDE HYDROCHLORIDE 5 MG/ML
10 INJECTION INTRAMUSCULAR; INTRAVENOUS
Status: DISCONTINUED | OUTPATIENT
Start: 2017-07-17 | End: 2017-07-17 | Stop reason: HOSPADM

## 2017-07-17 RX ORDER — DIPHENHYDRAMINE HYDROCHLORIDE 50 MG/ML
12.5 INJECTION INTRAMUSCULAR; INTRAVENOUS
Status: DISCONTINUED | OUTPATIENT
Start: 2017-07-17 | End: 2017-07-17 | Stop reason: HOSPADM

## 2017-07-17 RX ORDER — MEPERIDINE HYDROCHLORIDE 25 MG/ML
12.5 INJECTION INTRAMUSCULAR; INTRAVENOUS; SUBCUTANEOUS EVERY 5 MIN PRN
Status: DISCONTINUED | OUTPATIENT
Start: 2017-07-17 | End: 2017-07-17 | Stop reason: HOSPADM

## 2017-07-17 RX ORDER — LIDOCAINE HYDROCHLORIDE 10 MG/ML
INJECTION, SOLUTION EPIDURAL; INFILTRATION; INTRACAUDAL; PERINEURAL PRN
Status: DISCONTINUED | OUTPATIENT
Start: 2017-07-17 | End: 2017-07-17 | Stop reason: SDUPTHER

## 2017-07-17 RX ORDER — HYDROCODONE BITARTRATE AND ACETAMINOPHEN 5; 325 MG/1; MG/1
1 TABLET ORAL EVERY 6 HOURS PRN
Qty: 15 TABLET | Refills: 0 | Status: SHIPPED | OUTPATIENT
Start: 2017-07-17 | End: 2017-07-24

## 2017-07-17 RX ORDER — FENTANYL CITRATE 50 UG/ML
50 INJECTION, SOLUTION INTRAMUSCULAR; INTRAVENOUS EVERY 5 MIN PRN
Status: DISCONTINUED | OUTPATIENT
Start: 2017-07-17 | End: 2017-07-17 | Stop reason: HOSPADM

## 2017-07-17 RX ORDER — LIDOCAINE HYDROCHLORIDE 10 MG/ML
1 INJECTION, SOLUTION EPIDURAL; INFILTRATION; INTRACAUDAL; PERINEURAL
Status: DISCONTINUED | OUTPATIENT
Start: 2017-07-17 | End: 2017-07-17 | Stop reason: HOSPADM

## 2017-07-17 RX ORDER — HYDRALAZINE HYDROCHLORIDE 20 MG/ML
5 INJECTION INTRAMUSCULAR; INTRAVENOUS EVERY 10 MIN PRN
Status: DISCONTINUED | OUTPATIENT
Start: 2017-07-17 | End: 2017-07-17 | Stop reason: HOSPADM

## 2017-07-17 RX ORDER — ONDANSETRON 2 MG/ML
4 INJECTION INTRAMUSCULAR; INTRAVENOUS
Status: DISCONTINUED | OUTPATIENT
Start: 2017-07-17 | End: 2017-07-17 | Stop reason: HOSPADM

## 2017-07-17 RX ORDER — SODIUM CHLORIDE 9 MG/ML
INJECTION, SOLUTION INTRAVENOUS CONTINUOUS
Status: DISCONTINUED | OUTPATIENT
Start: 2017-07-17 | End: 2017-07-17 | Stop reason: HOSPADM

## 2017-07-17 RX ADMIN — FENTANYL CITRATE 50 MCG: 50 INJECTION, SOLUTION INTRAMUSCULAR; INTRAVENOUS at 11:34

## 2017-07-17 RX ADMIN — LIDOCAINE HYDROCHLORIDE 50 MG: 10 INJECTION, SOLUTION EPIDURAL; INFILTRATION; INTRACAUDAL; PERINEURAL at 11:10

## 2017-07-17 RX ADMIN — ONDANSETRON 4 MG: 2 INJECTION INTRAMUSCULAR; INTRAVENOUS at 11:29

## 2017-07-17 RX ADMIN — DEXTROSE 900 MG: 50 INJECTION, SOLUTION INTRAVENOUS at 11:21

## 2017-07-17 RX ADMIN — SODIUM CHLORIDE: 9 INJECTION, SOLUTION INTRAVENOUS at 09:01

## 2017-07-17 RX ADMIN — DEXAMETHASONE SODIUM PHOSPHATE 4 MG: 4 INJECTION, SOLUTION INTRAMUSCULAR; INTRAVENOUS at 11:29

## 2017-07-17 RX ADMIN — PROPOFOL 200 MG: 10 INJECTION, EMULSION INTRAVENOUS at 11:10

## 2017-07-17 RX ADMIN — SODIUM CHLORIDE: 9 INJECTION, SOLUTION INTRAVENOUS at 11:05

## 2017-07-17 RX ADMIN — FENTANYL CITRATE 50 MCG: 50 INJECTION, SOLUTION INTRAMUSCULAR; INTRAVENOUS at 11:18

## 2017-07-17 RX ADMIN — MIDAZOLAM 2 MG: 1 INJECTION INTRAMUSCULAR; INTRAVENOUS at 11:05

## 2017-07-17 ASSESSMENT — PAIN - FUNCTIONAL ASSESSMENT: PAIN_FUNCTIONAL_ASSESSMENT: 0-10

## 2017-07-17 ASSESSMENT — PAIN DESCRIPTION - ORIENTATION
ORIENTATION: LOWER
ORIENTATION: LOWER

## 2017-07-17 ASSESSMENT — PAIN DESCRIPTION - LOCATION
LOCATION: ABDOMEN
LOCATION: ABDOMEN

## 2017-07-17 ASSESSMENT — PAIN DESCRIPTION - DESCRIPTORS
DESCRIPTORS: CRAMPING
DESCRIPTORS: CRAMPING

## 2017-07-17 ASSESSMENT — PAIN SCALES - GENERAL
PAINLEVEL_OUTOF10: 0
PAINLEVEL_OUTOF10: 2
PAINLEVEL_OUTOF10: 2

## 2017-07-17 ASSESSMENT — PAIN DESCRIPTION - PAIN TYPE
TYPE: SURGICAL PAIN
TYPE: SURGICAL PAIN

## 2017-07-19 ENCOUNTER — TELEPHONE (OUTPATIENT)
Dept: OBGYN CLINIC | Age: 62
End: 2017-07-19

## 2017-07-20 LAB — SURGICAL PATHOLOGY REPORT: NORMAL

## 2017-09-07 ENCOUNTER — TELEPHONE (OUTPATIENT)
Dept: INTERNAL MEDICINE CLINIC | Age: 62
End: 2017-09-07

## 2017-09-19 ENCOUNTER — OFFICE VISIT (OUTPATIENT)
Dept: OBGYN CLINIC | Age: 62
End: 2017-09-19
Payer: MEDICARE

## 2017-09-19 VITALS
DIASTOLIC BLOOD PRESSURE: 82 MMHG | HEART RATE: 78 BPM | SYSTOLIC BLOOD PRESSURE: 125 MMHG | BODY MASS INDEX: 39.24 KG/M2 | HEIGHT: 67 IN | WEIGHT: 250 LBS

## 2017-09-19 DIAGNOSIS — N85.2 UTERINE HYPERPLASIA: ICD-10-CM

## 2017-09-19 DIAGNOSIS — N95.0 PMB (POSTMENOPAUSAL BLEEDING): Primary | ICD-10-CM

## 2017-09-19 DIAGNOSIS — Z98.890 STATUS POST HYSTEROSCOPY: ICD-10-CM

## 2017-09-19 DIAGNOSIS — Z96.652 HISTORY OF TOTAL LEFT KNEE REPLACEMENT: ICD-10-CM

## 2017-09-19 DIAGNOSIS — Z98.890 H/O DILATION AND CURETTAGE: ICD-10-CM

## 2017-09-19 PROCEDURE — 99214 OFFICE O/P EST MOD 30 MIN: CPT | Performed by: OBSTETRICS & GYNECOLOGY

## 2017-11-13 LAB
AVERAGE GLUCOSE: NORMAL
HBA1C MFR BLD: 5.5 %

## 2017-11-16 ENCOUNTER — TELEPHONE (OUTPATIENT)
Dept: INTERNAL MEDICINE CLINIC | Age: 62
End: 2017-11-16

## 2018-01-03 ENCOUNTER — PREP FOR PROCEDURE (OUTPATIENT)
Dept: OBGYN CLINIC | Age: 63
End: 2018-01-03

## 2018-01-03 ENCOUNTER — HOSPITAL ENCOUNTER (OUTPATIENT)
Dept: PREADMISSION TESTING | Age: 63
Discharge: HOME | End: 2018-01-03
Payer: MEDICARE

## 2018-01-03 ENCOUNTER — HOSPITAL ENCOUNTER (OUTPATIENT)
Dept: GENERAL RADIOLOGY | Age: 63
Discharge: HOME | End: 2018-01-03
Payer: MEDICARE

## 2018-01-03 ENCOUNTER — OFFICE VISIT (OUTPATIENT)
Dept: OBGYN CLINIC | Age: 63
End: 2018-01-03
Payer: MEDICARE

## 2018-01-03 VITALS
HEIGHT: 67 IN | HEART RATE: 60 BPM | SYSTOLIC BLOOD PRESSURE: 114 MMHG | TEMPERATURE: 97.9 F | WEIGHT: 245 LBS | OXYGEN SATURATION: 99 % | BODY MASS INDEX: 38.45 KG/M2 | DIASTOLIC BLOOD PRESSURE: 67 MMHG | RESPIRATION RATE: 16 BRPM

## 2018-01-03 VITALS
BODY MASS INDEX: 38.77 KG/M2 | HEIGHT: 67 IN | SYSTOLIC BLOOD PRESSURE: 130 MMHG | DIASTOLIC BLOOD PRESSURE: 84 MMHG | WEIGHT: 247 LBS | HEART RATE: 82 BPM | RESPIRATION RATE: 18 BRPM

## 2018-01-03 DIAGNOSIS — Z98.61 CAD S/P PERCUTANEOUS CORONARY ANGIOPLASTY: ICD-10-CM

## 2018-01-03 DIAGNOSIS — I25.2 HISTORY OF MYOCARDIAL INFARCTION: ICD-10-CM

## 2018-01-03 DIAGNOSIS — Z98.890 STATUS POST HYSTEROSCOPY: ICD-10-CM

## 2018-01-03 DIAGNOSIS — Z01.818 PREOP TESTING: Primary | ICD-10-CM

## 2018-01-03 DIAGNOSIS — E11.9 TYPE 2 DIABETES MELLITUS WITHOUT COMPLICATION, WITHOUT LONG-TERM CURRENT USE OF INSULIN (HCC): ICD-10-CM

## 2018-01-03 DIAGNOSIS — I25.10 CAD S/P PERCUTANEOUS CORONARY ANGIOPLASTY: ICD-10-CM

## 2018-01-03 DIAGNOSIS — Z01.818 PREOP TESTING: ICD-10-CM

## 2018-01-03 DIAGNOSIS — N85.2 UTERINE HYPERPLASIA: ICD-10-CM

## 2018-01-03 DIAGNOSIS — N95.0 PMB (POSTMENOPAUSAL BLEEDING): Primary | ICD-10-CM

## 2018-01-03 DIAGNOSIS — Z98.51 S/P TUBAL LIGATION: ICD-10-CM

## 2018-01-03 DIAGNOSIS — I10 ESSENTIAL HYPERTENSION: ICD-10-CM

## 2018-01-03 DIAGNOSIS — Z96.652 HISTORY OF TOTAL LEFT KNEE REPLACEMENT: ICD-10-CM

## 2018-01-03 LAB
-: NORMAL
ABO/RH: NORMAL
ABSOLUTE EOS #: 0.3 K/UL (ref 0–0.4)
ABSOLUTE IMMATURE GRANULOCYTE: ABNORMAL K/UL (ref 0–0.3)
ABSOLUTE LYMPH #: 1.4 K/UL (ref 1–4.8)
ABSOLUTE MONO #: 0.4 K/UL (ref 0.1–1.3)
AMORPHOUS: NORMAL
ANION GAP SERPL CALCULATED.3IONS-SCNC: 11 MMOL/L (ref 9–17)
ANTIBODY SCREEN: NEGATIVE
ARM BAND NUMBER: NORMAL
BACTERIA: NORMAL
BASOPHILS # BLD: 1 % (ref 0–2)
BASOPHILS ABSOLUTE: 0.1 K/UL (ref 0–0.2)
BILIRUBIN URINE: NEGATIVE
BUN BLDV-MCNC: 14 MG/DL (ref 8–23)
BUN/CREAT BLD: ABNORMAL (ref 9–20)
CALCIUM SERPL-MCNC: 9.8 MG/DL (ref 8.6–10.4)
CASTS UA: NORMAL /LPF
CHLORIDE BLD-SCNC: 102 MMOL/L (ref 98–107)
CO2: 28 MMOL/L (ref 20–31)
COLOR: YELLOW
COMMENT UA: ABNORMAL
CREAT SERPL-MCNC: 1.07 MG/DL (ref 0.5–0.9)
CRYSTALS, UA: NORMAL /HPF
DIFFERENTIAL TYPE: ABNORMAL
EKG ATRIAL RATE: 57 BPM
EKG P AXIS: 29 DEGREES
EKG P-R INTERVAL: 140 MS
EKG Q-T INTERVAL: 448 MS
EKG QRS DURATION: 90 MS
EKG QTC CALCULATION (BAZETT): 436 MS
EKG R AXIS: 32 DEGREES
EKG T AXIS: 40 DEGREES
EKG VENTRICULAR RATE: 57 BPM
EOSINOPHILS RELATIVE PERCENT: 3 % (ref 0–4)
EPITHELIAL CELLS UA: NORMAL /HPF
EXPIRATION DATE: NORMAL
GFR AFRICAN AMERICAN: >60 ML/MIN
GFR NON-AFRICAN AMERICAN: 52 ML/MIN
GFR SERPL CREATININE-BSD FRML MDRD: ABNORMAL ML/MIN/{1.73_M2}
GFR SERPL CREATININE-BSD FRML MDRD: ABNORMAL ML/MIN/{1.73_M2}
GLUCOSE BLD-MCNC: 143 MG/DL (ref 70–99)
GLUCOSE URINE: NEGATIVE
HCT VFR BLD CALC: 39.9 % (ref 36–46)
HEMOGLOBIN: 12.7 G/DL (ref 12–16)
IMMATURE GRANULOCYTES: ABNORMAL %
INR BLD: 1
KETONES, URINE: NEGATIVE
LEUKOCYTE ESTERASE, URINE: ABNORMAL
LYMPHOCYTES # BLD: 18 % (ref 24–44)
MCH RBC QN AUTO: 27.3 PG (ref 26–34)
MCHC RBC AUTO-ENTMCNC: 31.9 G/DL (ref 31–37)
MCV RBC AUTO: 85.6 FL (ref 80–100)
MONOCYTES # BLD: 5 % (ref 1–7)
MUCUS: NORMAL
NITRITE, URINE: NEGATIVE
OTHER OBSERVATIONS UA: NORMAL
PARTIAL THROMBOPLASTIN TIME: 25.7 SEC (ref 23–31)
PDW BLD-RTO: 15.4 % (ref 11.5–14.9)
PH UA: 6 (ref 5–8)
PLATELET # BLD: 197 K/UL (ref 150–450)
PLATELET ESTIMATE: ABNORMAL
PMV BLD AUTO: 9 FL (ref 6–12)
POTASSIUM SERPL-SCNC: 4.2 MMOL/L (ref 3.7–5.3)
PROTEIN UA: NEGATIVE
PROTHROMBIN TIME: 10.9 SEC (ref 9.7–12)
RBC # BLD: 4.66 M/UL (ref 4–5.2)
RBC # BLD: ABNORMAL 10*6/UL
RBC UA: NORMAL /HPF
RENAL EPITHELIAL, UA: NORMAL /HPF
SEG NEUTROPHILS: 73 % (ref 36–66)
SEGMENTED NEUTROPHILS ABSOLUTE COUNT: 5.8 K/UL (ref 1.3–9.1)
SODIUM BLD-SCNC: 141 MMOL/L (ref 135–144)
SPECIFIC GRAVITY UA: 1.01 (ref 1–1.03)
TRICHOMONAS: NORMAL
TURBIDITY: CLEAR
URINE HGB: NEGATIVE
UROBILINOGEN, URINE: NORMAL
WBC # BLD: 8 K/UL (ref 3.5–11)
WBC # BLD: ABNORMAL 10*3/UL
WBC UA: NORMAL /HPF
YEAST: NORMAL

## 2018-01-03 PROCEDURE — 81001 URINALYSIS AUTO W/SCOPE: CPT

## 2018-01-03 PROCEDURE — 85025 COMPLETE CBC W/AUTO DIFF WBC: CPT

## 2018-01-03 PROCEDURE — 71046 X-RAY EXAM CHEST 2 VIEWS: CPT

## 2018-01-03 PROCEDURE — G8484 FLU IMMUNIZE NO ADMIN: HCPCS | Performed by: OBSTETRICS & GYNECOLOGY

## 2018-01-03 PROCEDURE — G8598 ASA/ANTIPLAT THER USED: HCPCS | Performed by: OBSTETRICS & GYNECOLOGY

## 2018-01-03 PROCEDURE — G8427 DOCREV CUR MEDS BY ELIG CLIN: HCPCS | Performed by: OBSTETRICS & GYNECOLOGY

## 2018-01-03 PROCEDURE — 86900 BLOOD TYPING SEROLOGIC ABO: CPT

## 2018-01-03 PROCEDURE — 99213 OFFICE O/P EST LOW 20 MIN: CPT | Performed by: OBSTETRICS & GYNECOLOGY

## 2018-01-03 PROCEDURE — 86901 BLOOD TYPING SEROLOGIC RH(D): CPT

## 2018-01-03 PROCEDURE — 36415 COLL VENOUS BLD VENIPUNCTURE: CPT

## 2018-01-03 PROCEDURE — 80048 BASIC METABOLIC PNL TOTAL CA: CPT

## 2018-01-03 PROCEDURE — 3017F COLORECTAL CA SCREEN DOC REV: CPT | Performed by: OBSTETRICS & GYNECOLOGY

## 2018-01-03 PROCEDURE — 85610 PROTHROMBIN TIME: CPT

## 2018-01-03 PROCEDURE — G8417 CALC BMI ABV UP PARAM F/U: HCPCS | Performed by: OBSTETRICS & GYNECOLOGY

## 2018-01-03 PROCEDURE — 85730 THROMBOPLASTIN TIME PARTIAL: CPT

## 2018-01-03 PROCEDURE — 86850 RBC ANTIBODY SCREEN: CPT

## 2018-01-03 PROCEDURE — 87086 URINE CULTURE/COLONY COUNT: CPT

## 2018-01-03 PROCEDURE — 93005 ELECTROCARDIOGRAM TRACING: CPT

## 2018-01-03 PROCEDURE — 3046F HEMOGLOBIN A1C LEVEL >9.0%: CPT | Performed by: OBSTETRICS & GYNECOLOGY

## 2018-01-03 PROCEDURE — 1036F TOBACCO NON-USER: CPT | Performed by: OBSTETRICS & GYNECOLOGY

## 2018-01-03 PROCEDURE — 3014F SCREEN MAMMO DOC REV: CPT | Performed by: OBSTETRICS & GYNECOLOGY

## 2018-01-03 RX ORDER — SODIUM CHLORIDE 0.9 % (FLUSH) 0.9 %
10 SYRINGE (ML) INJECTION EVERY 12 HOURS SCHEDULED
Status: CANCELLED | OUTPATIENT
Start: 2018-01-03

## 2018-01-03 RX ORDER — TIZANIDINE 4 MG/1
4 TABLET ORAL EVERY 8 HOURS PRN
COMMUNITY
End: 2019-09-18 | Stop reason: SDUPTHER

## 2018-01-03 RX ORDER — SODIUM CHLORIDE, SODIUM LACTATE, POTASSIUM CHLORIDE, CALCIUM CHLORIDE 600; 310; 30; 20 MG/100ML; MG/100ML; MG/100ML; MG/100ML
INJECTION, SOLUTION INTRAVENOUS CONTINUOUS
Status: CANCELLED | OUTPATIENT
Start: 2018-01-03

## 2018-01-03 RX ORDER — HYDROCODONE BITARTRATE AND ACETAMINOPHEN 5; 325 MG/1; MG/1
1 TABLET ORAL 2 TIMES DAILY PRN
Status: ON HOLD | COMMUNITY
End: 2018-01-10 | Stop reason: HOSPADM

## 2018-01-03 RX ORDER — SODIUM CHLORIDE 0.9 % (FLUSH) 0.9 %
10 SYRINGE (ML) INJECTION PRN
Status: CANCELLED | OUTPATIENT
Start: 2018-01-03

## 2018-01-03 ASSESSMENT — PAIN DESCRIPTION - FREQUENCY: FREQUENCY: CONTINUOUS

## 2018-01-03 ASSESSMENT — PAIN DESCRIPTION - DESCRIPTORS: DESCRIPTORS: ACHING;CONSTANT

## 2018-01-03 ASSESSMENT — PAIN DESCRIPTION - PROGRESSION: CLINICAL_PROGRESSION: NOT CHANGED

## 2018-01-03 ASSESSMENT — PAIN DESCRIPTION - PAIN TYPE: TYPE: CHRONIC PAIN

## 2018-01-03 ASSESSMENT — PAIN DESCRIPTION - LOCATION: LOCATION: BACK

## 2018-01-03 ASSESSMENT — PAIN DESCRIPTION - ONSET: ONSET: ON-GOING

## 2018-01-03 ASSESSMENT — PAIN DESCRIPTION - ORIENTATION: ORIENTATION: LOWER

## 2018-01-03 ASSESSMENT — PAIN SCALES - GENERAL: PAINLEVEL_OUTOF10: 5

## 2018-01-03 NOTE — PROGRESS NOTES
[facility]  [address]; [address]  [place], [address] [place] [address]  [phone] mn [phone] Fax        Rufino Yeager  1955  Primary Care Physician: Akira Chavarria MD        [address] [place] [address] Street: [facility]      1/3/2018  MEDICAID CONSENT COMPLETED: No      HPI: Rufino Yeager is a 58 y.o. female   Pt with continued PMB. She had a D&C Hscope Myosure 2017. Pathology was consistent with simple and complex hyperplasia without atypia. She completed a Left total Knee in the post op window and missed her post op appt. She was offered Progesterone for 3-6 months and re-sample but declined. RBA reviewed. She is on Plavix and a baby ASA. She stopped bleeding then restarted spotting. Her pap 2017 was normal with negative hpv. She wishes fu D&C Hysteroscopy . Pending findings possible hysterectomy vs expectant fu. Relevant Past History:   Patient Active Problem List    Diagnosis Date Noted    Uterine hyperplasia Simple and complex WITHOUT ATYPIA (2017) 2017     Priority: High     Discussed progesterone therapy and progression risk to Uterine cancer. Pt declined and medical intervention or hysterectomy due to medical co-morbidities. Plan repeat sampling of uterus in 6-9 months to re-evaluate.       History of total left knee replacement 2017     Priority: High    PMB (postmenopausal bleeding) 2013     Priority: High    History of myocardial infarction      Priority: High     STENTS Prophylaxis if SURGERY      Hypertension      Priority: High    Tobacco abuse      Priority: High    17 Hysto, D/C, Myosure  2017     Priority: Medium    S/P tubal ligation 2013     Priority: Medium    Hypercholesterolemia      Priority: Medium    Diverticulitis      Priority: Medium    Obesity      Priority: Medium    Arthritis      Priority: Low    Bruises easily      Priority: Low    UTI (urinary tract infection) 2017    MYOMECTOMY performed by Lina Erickson DO at [facility] HYSTEROSCOPY  12/16/13    Operative Hscope with endometrial sampling, polypectomy & myomectomy Bro    HYSTEROSCOPY  07/17/2017    TONSILLECTOMY  age 22    TUBAL LIGATION  1979       Social History     Social History    Marital status: Single     Spouse name: N/A    Number of children: N/A    Years of education: N/A     Occupational History    Not on file. Social History Main Topics    Smoking status: Former Smoker     Packs/day: 0.00     Quit date: 1/24/2012    Smokeless tobacco: Never Used    Alcohol use Yes      Comment: Occassional    Drug use: No    Sexual activity: No     Other Topics Concern    Not on file     Social History Narrative    No narrative on file       Psychosocial History: stable    MEDICATIONS:  Current Outpatient Prescriptions   Medication Sig Dispense Refill    TRUE METRIX BLOOD GLUCOSE TEST strip       TRUEPLUS LANCETS 30G MISC       clotrimazole-betamethasone (LOTRISONE) 1-0.05 % cream Apply topically 2 times daily. 1 Tube 1    clopidogrel (PLAVIX) 75 MG tablet Take 75 mg by mouth daily      DULoxetine (CYMBALTA) 30 MG extended release capsule Take 30 mg by mouth daily      fluticasone (FLONASE) 50 MCG/ACT nasal spray 1 spray by Nasal route daily      nitroGLYCERIN (NITROSTAT) 0.4 MG SL tablet Place 0.4 mg under the tongue every 5 minutes as needed for Chest pain up to max of 3 total doses. If no relief after 1 dose, call 911.  metFORMIN (GLUCOPHAGE) 500 MG tablet Take 500 mg by mouth 2 times daily (with meals)       baclofen (LIORESAL) 10 MG tablet Take 10 mg by mouth 2 times daily      isosorbide mononitrate (IMDUR) 30 MG CR tablet Take 30 mg by mouth daily      metoprolol (LOPRESSOR) 50 MG tablet Take 50 mg by mouth 2 times daily.  atorvastatin (LIPITOR) 80 MG tablet Take 80 mg by mouth daily.  aspirin 81 MG EC tablet Take 81 mg by mouth daily.          No current disease) stage 3, GFR 30-59 ml/min 06/08/2017    Diabetic polyneuropathy associated with type 2 diabetes mellitus ([facility] [address] [facility] 06/08/2017    Morbid obesity due to excess calories ([facility] 06/08/2017    SOB (shortness of breath) 06/20/2016    DDD (degenerative disc disease)     Neuropathy (HCC)        PAP: WNL 4/2017 neg HPV  Permanent Sterilization Completed: Yes     Plan:  1. D&C Hysteroscopy possible Myosure  2. Surgical clearance obtained  3. Stop Plavix 5 days prior to OR . OK to continue ASA 81 mg/dy  No orders of the defined types were placed in this encounter. Counseling: The patient was counseled on all options both medical and surgical, conservative as well as definitive. She has elected to proceed with the procedure as stated above. The patient was counseled on the procedure. Risks and complications were reviewed in detail. The patients orders, labs, consents have been completed. The history and physical as well as all supporting surgical documentation will be forwarded to the pre-operative holding area. The patient is aware that this procedure may not alleviate her symptoms. That there may be a necessity for a second surgery and that there may be an incomplete removal of abnormal tissue.                    ________________________________________D. O. Date:_______________  Teresa DO Opal        Patient was seen with total face to face time of 15 minutes. More than 50% of this visit was on counseling and education regarding her   1. PMB (postmenopausal bleeding)     2. 7/17/17 Hysto, D/C, Myosure      3. Uterine hyperplasia Simple and complex WITHOUT ATYPIA     4. History of myocardial infarction     5. CAD S/P percutaneous coronary angioplasty 2009 WITH STENT     6. S/P tubal ligation     7. Essential hypertension     8. Type 2 diabetes mellitus without complication, without long-term current use of insulin ([facility]     9. History of total left knee replacement      and her options.  She was also counseled on her preventative health maintenance recommendations and follow-up.

## 2018-01-04 ENCOUNTER — ANESTHESIA EVENT (OUTPATIENT)
Dept: OPERATING ROOM | Age: 63
End: 2018-01-04
Payer: MEDICARE

## 2018-01-04 RX ORDER — SODIUM CHLORIDE 0.9 % (FLUSH) 0.9 %
10 SYRINGE (ML) INJECTION EVERY 12 HOURS SCHEDULED
Status: CANCELLED | OUTPATIENT
Start: 2018-01-04

## 2018-01-04 RX ORDER — SODIUM CHLORIDE 0.9 % (FLUSH) 0.9 %
10 SYRINGE (ML) INJECTION PRN
Status: CANCELLED | OUTPATIENT
Start: 2018-01-04

## 2018-01-04 RX ORDER — SODIUM CHLORIDE 9 MG/ML
INJECTION, SOLUTION INTRAVENOUS CONTINUOUS
Status: CANCELLED | OUTPATIENT
Start: 2018-01-04

## 2018-01-04 RX ORDER — LIDOCAINE HYDROCHLORIDE 10 MG/ML
1 INJECTION, SOLUTION EPIDURAL; INFILTRATION; INTRACAUDAL; PERINEURAL
Status: CANCELLED | OUTPATIENT
Start: 2018-01-04 | End: 2018-01-04

## 2018-01-05 LAB
CULTURE: NORMAL
CULTURE: NORMAL
Lab: NORMAL
SPECIMEN DESCRIPTION: NORMAL
SPECIMEN DESCRIPTION: NORMAL
STATUS: NORMAL

## 2018-01-10 ENCOUNTER — HOSPITAL ENCOUNTER (OUTPATIENT)
Age: 63
Setting detail: OUTPATIENT SURGERY
Discharge: HOME | End: 2018-01-10
Attending: OBSTETRICS & GYNECOLOGY | Admitting: OBSTETRICS & GYNECOLOGY
Payer: MEDICARE

## 2018-01-10 ENCOUNTER — ANESTHESIA (OUTPATIENT)
Dept: OPERATING ROOM | Age: 63
End: 2018-01-10
Payer: MEDICARE

## 2018-01-10 VITALS
RESPIRATION RATE: 20 BRPM | SYSTOLIC BLOOD PRESSURE: 139 MMHG | DIASTOLIC BLOOD PRESSURE: 77 MMHG | OXYGEN SATURATION: 100 %

## 2018-01-10 VITALS
RESPIRATION RATE: 22 BRPM | HEART RATE: 71 BPM | DIASTOLIC BLOOD PRESSURE: 69 MMHG | TEMPERATURE: 96.7 F | BODY MASS INDEX: 38.45 KG/M2 | WEIGHT: 245 LBS | SYSTOLIC BLOOD PRESSURE: 127 MMHG | HEIGHT: 67 IN | OXYGEN SATURATION: 96 %

## 2018-01-10 PROBLEM — Z98.890 STATUS POST D&C: Status: ACTIVE | Noted: 2018-01-10

## 2018-01-10 LAB
GLUCOSE BLD-MCNC: 101 MG/DL (ref 65–105)
GLUCOSE BLD-MCNC: 104 MG/DL (ref 65–105)

## 2018-01-10 PROCEDURE — 88305 TISSUE EXAM BY PATHOLOGIST: CPT

## 2018-01-10 PROCEDURE — 3600000012 HC SURGERY LEVEL 2 ADDTL 15MIN: Performed by: OBSTETRICS & GYNECOLOGY

## 2018-01-10 PROCEDURE — 6360000002 HC RX W HCPCS

## 2018-01-10 PROCEDURE — 2500000003 HC RX 250 WO HCPCS: Performed by: OBSTETRICS & GYNECOLOGY

## 2018-01-10 PROCEDURE — 3600000002 HC SURGERY LEVEL 2 BASE: Performed by: OBSTETRICS & GYNECOLOGY

## 2018-01-10 PROCEDURE — 7100000031 HC ASPR PHASE II RECOVERY - ADDTL 15 MIN: Performed by: OBSTETRICS & GYNECOLOGY

## 2018-01-10 PROCEDURE — 7100000001 HC PACU RECOVERY - ADDTL 15 MIN: Performed by: OBSTETRICS & GYNECOLOGY

## 2018-01-10 PROCEDURE — 3700000000 HC ANESTHESIA ATTENDED CARE: Performed by: OBSTETRICS & GYNECOLOGY

## 2018-01-10 PROCEDURE — 7100000000 HC PACU RECOVERY - FIRST 15 MIN: Performed by: OBSTETRICS & GYNECOLOGY

## 2018-01-10 PROCEDURE — 2580000003 HC RX 258: Performed by: OBSTETRICS & GYNECOLOGY

## 2018-01-10 PROCEDURE — 82947 ASSAY GLUCOSE BLOOD QUANT: CPT

## 2018-01-10 PROCEDURE — 58558 HYSTEROSCOPY BIOPSY: CPT | Performed by: OBSTETRICS & GYNECOLOGY

## 2018-01-10 PROCEDURE — 2580000003 HC RX 258: Performed by: ANESTHESIOLOGY

## 2018-01-10 PROCEDURE — 3700000001 HC ADD 15 MINUTES (ANESTHESIA): Performed by: OBSTETRICS & GYNECOLOGY

## 2018-01-10 PROCEDURE — 7100000030 HC ASPR PHASE II RECOVERY - FIRST 15 MIN: Performed by: OBSTETRICS & GYNECOLOGY

## 2018-01-10 PROCEDURE — 2500000003 HC RX 250 WO HCPCS

## 2018-01-10 RX ORDER — LIDOCAINE HYDROCHLORIDE 10 MG/ML
1 INJECTION, SOLUTION EPIDURAL; INFILTRATION; INTRACAUDAL; PERINEURAL
Status: DISCONTINUED | OUTPATIENT
Start: 2018-01-10 | End: 2018-01-10 | Stop reason: HOSPADM

## 2018-01-10 RX ORDER — MEPERIDINE HYDROCHLORIDE 50 MG/ML
12.5 INJECTION INTRAMUSCULAR; INTRAVENOUS; SUBCUTANEOUS EVERY 5 MIN PRN
Status: DISCONTINUED | OUTPATIENT
Start: 2018-01-10 | End: 2018-01-10 | Stop reason: HOSPADM

## 2018-01-10 RX ORDER — SODIUM CHLORIDE 0.9 % (FLUSH) 0.9 %
10 SYRINGE (ML) INJECTION PRN
Status: DISCONTINUED | OUTPATIENT
Start: 2018-01-10 | End: 2018-01-10 | Stop reason: HOSPADM

## 2018-01-10 RX ORDER — SODIUM CHLORIDE 0.9 % (FLUSH) 0.9 %
10 SYRINGE (ML) INJECTION EVERY 12 HOURS SCHEDULED
Status: DISCONTINUED | OUTPATIENT
Start: 2018-01-10 | End: 2018-01-10 | Stop reason: HOSPADM

## 2018-01-10 RX ORDER — OXYCODONE HYDROCHLORIDE AND ACETAMINOPHEN 5; 325 MG/1; MG/1
2 TABLET ORAL PRN
Status: DISCONTINUED | OUTPATIENT
Start: 2018-01-10 | End: 2018-01-10 | Stop reason: HOSPADM

## 2018-01-10 RX ORDER — CLINDAMYCIN PHOSPHATE 150 MG/ML
INJECTION, SOLUTION INTRAVENOUS
Status: DISCONTINUED
Start: 2018-01-10 | End: 2018-01-10 | Stop reason: HOSPADM

## 2018-01-10 RX ORDER — PROPOFOL 10 MG/ML
INJECTION, EMULSION INTRAVENOUS PRN
Status: DISCONTINUED | OUTPATIENT
Start: 2018-01-10 | End: 2018-01-10 | Stop reason: SDUPTHER

## 2018-01-10 RX ORDER — OXYCODONE HYDROCHLORIDE AND ACETAMINOPHEN 5; 325 MG/1; MG/1
1 TABLET ORAL PRN
Status: DISCONTINUED | OUTPATIENT
Start: 2018-01-10 | End: 2018-01-10 | Stop reason: HOSPADM

## 2018-01-10 RX ORDER — IBUPROFEN 200 MG
400 TABLET ORAL EVERY 8 HOURS PRN
Qty: 120 TABLET | Refills: 3 | Status: SHIPPED | OUTPATIENT
Start: 2018-01-10 | End: 2018-01-30

## 2018-01-10 RX ORDER — IBUPROFEN 200 MG
400 TABLET ORAL EVERY 8 HOURS PRN
Qty: 120 TABLET | Refills: 3 | Status: SHIPPED | OUTPATIENT
Start: 2018-01-10 | End: 2018-01-10

## 2018-01-10 RX ORDER — FENTANYL CITRATE 50 UG/ML
INJECTION, SOLUTION INTRAMUSCULAR; INTRAVENOUS PRN
Status: DISCONTINUED | OUTPATIENT
Start: 2018-01-10 | End: 2018-01-10 | Stop reason: SDUPTHER

## 2018-01-10 RX ORDER — DEXAMETHASONE SODIUM PHOSPHATE 4 MG/ML
INJECTION, SOLUTION INTRA-ARTICULAR; INTRALESIONAL; INTRAMUSCULAR; INTRAVENOUS; SOFT TISSUE PRN
Status: DISCONTINUED | OUTPATIENT
Start: 2018-01-10 | End: 2018-01-10 | Stop reason: SDUPTHER

## 2018-01-10 RX ORDER — SODIUM CHLORIDE, SODIUM LACTATE, POTASSIUM CHLORIDE, CALCIUM CHLORIDE 600; 310; 30; 20 MG/100ML; MG/100ML; MG/100ML; MG/100ML
INJECTION, SOLUTION INTRAVENOUS CONTINUOUS
Status: DISCONTINUED | OUTPATIENT
Start: 2018-01-10 | End: 2018-01-10 | Stop reason: HOSPADM

## 2018-01-10 RX ORDER — FENTANYL CITRATE 50 UG/ML
25 INJECTION, SOLUTION INTRAMUSCULAR; INTRAVENOUS EVERY 5 MIN PRN
Status: DISCONTINUED | OUTPATIENT
Start: 2018-01-10 | End: 2018-01-10 | Stop reason: HOSPADM

## 2018-01-10 RX ORDER — MIDAZOLAM HYDROCHLORIDE 1 MG/ML
INJECTION INTRAMUSCULAR; INTRAVENOUS PRN
Status: DISCONTINUED | OUTPATIENT
Start: 2018-01-10 | End: 2018-01-10 | Stop reason: SDUPTHER

## 2018-01-10 RX ORDER — ACETAMINOPHEN 325 MG/1
650 TABLET ORAL ONCE
Status: DISCONTINUED | OUTPATIENT
Start: 2018-01-10 | End: 2018-01-10 | Stop reason: CLARIF

## 2018-01-10 RX ORDER — SODIUM CHLORIDE 9 MG/ML
INJECTION, SOLUTION INTRAVENOUS CONTINUOUS
Status: DISCONTINUED | OUTPATIENT
Start: 2018-01-10 | End: 2018-01-10 | Stop reason: HOSPADM

## 2018-01-10 RX ORDER — LIDOCAINE HYDROCHLORIDE 10 MG/ML
INJECTION, SOLUTION EPIDURAL; INFILTRATION; INTRACAUDAL; PERINEURAL PRN
Status: DISCONTINUED | OUTPATIENT
Start: 2018-01-10 | End: 2018-01-10 | Stop reason: SDUPTHER

## 2018-01-10 RX ORDER — ONDANSETRON 2 MG/ML
INJECTION INTRAMUSCULAR; INTRAVENOUS PRN
Status: DISCONTINUED | OUTPATIENT
Start: 2018-01-10 | End: 2018-01-10 | Stop reason: SDUPTHER

## 2018-01-10 RX ORDER — DIPHENHYDRAMINE HYDROCHLORIDE 50 MG/ML
12.5 INJECTION INTRAMUSCULAR; INTRAVENOUS
Status: DISCONTINUED | OUTPATIENT
Start: 2018-01-10 | End: 2018-01-10 | Stop reason: HOSPADM

## 2018-01-10 RX ORDER — PROMETHAZINE HYDROCHLORIDE 25 MG/ML
6.25 INJECTION, SOLUTION INTRAMUSCULAR; INTRAVENOUS
Status: DISCONTINUED | OUTPATIENT
Start: 2018-01-10 | End: 2018-01-10 | Stop reason: HOSPADM

## 2018-01-10 RX ADMIN — MIDAZOLAM 2 MG: 1 INJECTION INTRAMUSCULAR; INTRAVENOUS at 11:53

## 2018-01-10 RX ADMIN — ONDANSETRON 4 MG: 2 INJECTION INTRAMUSCULAR; INTRAVENOUS at 12:08

## 2018-01-10 RX ADMIN — PROPOFOL 150 MG: 10 INJECTION, EMULSION INTRAVENOUS at 11:59

## 2018-01-10 RX ADMIN — FENTANYL CITRATE 50 MCG: 50 INJECTION, SOLUTION INTRAMUSCULAR; INTRAVENOUS at 11:59

## 2018-01-10 RX ADMIN — DEXAMETHASONE SODIUM PHOSPHATE 4 MG: 4 INJECTION, SOLUTION INTRAMUSCULAR; INTRAVENOUS at 12:08

## 2018-01-10 RX ADMIN — DEXTROSE 900 MG: 50 INJECTION, SOLUTION INTRAVENOUS at 12:06

## 2018-01-10 RX ADMIN — SODIUM CHLORIDE: 9 INJECTION, SOLUTION INTRAVENOUS at 10:51

## 2018-01-10 RX ADMIN — LIDOCAINE HYDROCHLORIDE 50 MG: 10 INJECTION, SOLUTION EPIDURAL; INFILTRATION; INTRACAUDAL; PERINEURAL at 11:59

## 2018-01-10 RX ADMIN — FENTANYL CITRATE 50 MCG: 50 INJECTION, SOLUTION INTRAMUSCULAR; INTRAVENOUS at 12:06

## 2018-01-10 ASSESSMENT — PULMONARY FUNCTION TESTS
PIF_VALUE: 17
PIF_VALUE: 18
PIF_VALUE: 13
PIF_VALUE: 16
PIF_VALUE: 2
PIF_VALUE: 16
PIF_VALUE: 16
PIF_VALUE: 18
PIF_VALUE: 17
PIF_VALUE: 26
PIF_VALUE: 17
PIF_VALUE: 12
PIF_VALUE: 3
PIF_VALUE: 16
PIF_VALUE: 16
PIF_VALUE: 0
PIF_VALUE: 4
PIF_VALUE: 1
PIF_VALUE: 17
PIF_VALUE: 16
PIF_VALUE: 13
PIF_VALUE: 0
PIF_VALUE: 17
PIF_VALUE: 16
PIF_VALUE: 8
PIF_VALUE: 0
PIF_VALUE: 12
PIF_VALUE: 16
PIF_VALUE: 17
PIF_VALUE: 17

## 2018-01-10 ASSESSMENT — ENCOUNTER SYMPTOMS: SHORTNESS OF BREATH: 1

## 2018-01-10 ASSESSMENT — PAIN DESCRIPTION - DESCRIPTORS: DESCRIPTORS: ACHING;CONSTANT

## 2018-01-10 ASSESSMENT — PAIN SCALES - GENERAL
PAINLEVEL_OUTOF10: 0

## 2018-01-10 NOTE — INTERVAL H&P NOTE
Gynecologic Surgery Pre-Operative Attestation Note:      Hospital: [facility]  Date: 1/10/2018  Time: 10:57 AM      Patient Name: Silvina Almonte  Patient : 1955  Room/Bed: [facility] OR Pool/NONE  Admission Date/Time: 1/10/2018  9:36 AM  MRN #: [id number]  [facility] #: [id number]            Attending Physician Statement  I have discussed and reviewed the care of Silvina Almonte, including pertinent history and exam findings. I have reviewed the note in the electronic medical record. I have seen and examined the patient in the pre-op holding area and the key elements of all parts of the encounter have been performed/reviewed by me . I agree with the assessment, plan and orders as documented. The procedure risks and complications were discussed as well as the possibility of the need for a second surgery and incomplete removal of abnormal tissue. The patient voiced understanding. Vitals:    01/10/18 1037   BP: (!) 163/85   Pulse: 63   Resp: 16   Temp: 97.3 °F (36.3 °C)   SpO2: 98%       Admission on 01/10/2018   Component Date Value Ref Range Status    POC Glucose 01/10/2018 101  65 - 105 mg/dL Final   Hospital Outpatient Visit on 2018   Component Date Value Ref Range Status    Protime 2018 10.9  9.7 - 12.0 sec Final    INR 2018 1.0   Final    Comment:       Non-therapeutic Range:     INR = 0.9-1.2  Therapeutic Range: Moderate Anticoagulant Intensity:     INR = 2.0-3.0   High Anticoagulant Intensity:     INR = 2.5-3.5        Performed at [address]   [phone]      Expiration Date 2018   Final    Arm Band Number 2018 [id number]   Final    ABO/Rh 2018 O POSITIVE   Final    Antibody Screen 2018 NEGATIVE   Final    Comment: Performed at [address]   [phone]      Color, UA 2018 YELLOW  YEL Final    Turbidity UA available at:        [email or web address]        Performed at [facility]: [place]. [place] [address] [place] [address]   [phone]      GFR Staging 01/03/2018 NOT REPORTED   Final    PTT 01/03/2018 25.7  23.0 - 31.0 sec Final    Comment:       IV Heparin Therapy Range:     64.3-87.8        Performed at [facility]: [place]. [place] [address] [place] [address]   ([phone]      - 01/03/2018        Final    WBC, UA 01/03/2018 5 TO 10  /HPF Final    RBC, UA 01/03/2018 None  /HPF Final    Comment: Performed at [facility]: [place]. [place] [address] [place] [address]   [phone]      Casts UA 01/03/2018 NOT REPORTED  /LPF Final    Crystals UA 01/03/2018 NOT REPORTED  NONE /HPF Final    Epithelial Cells UA 01/03/2018 NOT REPORTED  /HPF Final    Renal Epithelial, Urine 01/03/2018 NOT REPORTED  0 /HPF Final    Bacteria, UA 01/03/2018 NOT REPORTED  NONE Final    Mucus, UA 01/03/2018 NOT REPORTED  NONE Final    Trichomonas, UA 01/03/2018 NOT REPORTED  NONE Final    Amorphous, UA 01/03/2018 NOT REPORTED  NONE Final    Other Observations UA 01/03/2018 NOT REPORTED  NREQ Final    Yeast, UA 01/03/2018 NOT REPORTED  NONE Final   ]  FBS this     Assessment:    1. PMB (postmenopausal bleeding)     2. 7/17/17 Hysto, D/C, Myosure      3. Uterine hyperplasia Simple and complex WITHOUT ATYPIA     4. History of myocardial infarction     5. CAD S/P percutaneous coronary angioplasty 2009 WITH STENT     6. S/P tubal ligation     7. Essential hypertension     8. Type 2 diabetes mellitus without complication, without long-term current use of insulin ([address]     9. History of total left knee replacement     10.     Chronic blood thinners    Patient Active Problem List    Diagnosis Date Noted    Uterine hyperplasia Simple and complex WITHOUT ATYPIA (7/17/2017) 09/19/2017     Priority: High     Overview Note:     Discussed progesterone therapy and

## 2018-01-10 NOTE — ANESTHESIA PRE PROCEDURE
Department of Anesthesiology  Preprocedure Note       Name:  Marco A Bello   Age:  58 y.o.  :  1955                                          MRN:  [id number]         Date:  1/10/2018      Surgeon: Blake Akbar):  Minerva Naidu DO    Procedure: Procedure(s):  DILATATION AND CURETTAGE HYSTEROSCOPY POSSIBLE MYOSURE    Medications prior to admission:   Prior to Admission medications    Medication Sig Start Date End Date Taking? Authorizing Provider   tiZANidine (ZANAFLEX) 4 MG tablet Take 4 mg by mouth every 8 hours as needed    Historical Provider, MD   HYDROcodone-acetaminophen (NORCO) 5-325 MG per tablet Take 1 tablet by mouth 2 times daily as needed for Pain. Historical Provider, MD   TRUE METRIX BLOOD GLUCOSE TEST strip  17   Historical Provider, MD   TRUEPLUS LANCETS 30G MISC  4/10/17   Historical Provider, MD   clopidogrel (PLAVIX) 75 MG tablet Take 75 mg by mouth daily    Historical Provider, MD   DULoxetine (CYMBALTA) 30 MG extended release capsule Take 30 mg by mouth 2 times daily     Historical Provider, MD   fluticasone (FLONASE) 50 MCG/ACT nasal spray 1 spray by Nasal route daily    Historical Provider, MD   nitroGLYCERIN (NITROSTAT) 0.4 MG SL tablet Place 0.4 mg under the tongue every 5 minutes as needed for Chest pain up to max of 3 total doses. If no relief after 1 dose, call 911. Historical Provider, MD   metFORMIN (GLUCOPHAGE) 500 MG tablet Take 500 mg by mouth 2 times daily (with meals)     Historical Provider, MD   isosorbide mononitrate (IMDUR) 30 MG CR tablet Take 30 mg by mouth daily    Historical Provider, MD   metoprolol (LOPRESSOR) 50 MG tablet Take 50 mg by mouth 2 times daily. Historical Provider, MD   atorvastatin (LIPITOR) 80 MG tablet Take 80 mg by mouth daily. Historical Provider, MD   aspirin 81 MG EC tablet Take 81 mg by mouth daily.       Historical Provider, MD       Current medications:    Current Facility-Administered Medications   Medication Dose

## 2018-01-10 NOTE — OP NOTE
dorsal lithotomy position. She was prepped and draped in the normal sterile fashion. EPC's were in place and operating correctly. Her bladder was drained with a red fleming catheter. A weighted speculum was placed along the posterior vaginal wall and the anterior lip of the cervix was visualized and grasped with an allis clamp. A Kevorkian curet was utilized to completed the ECC scant tissue obtained. The uterus was sounded to 7.5 cm. The cervix was dilated slightly using hegar dilators to a # 7 and the hysteroscope was introduced through the endocervical canal into the uterine cavity. Intra-operative video was completed. Findings noted above. The hysteroscope was removed and a sharp curette was introduced. Circumferential scrapings of the uterine cavity were completed. This was sent to pathology. The hysteroscope was replaced and there was noted to be no perforation of the uterus. The hysteroscope was removed, along with the allis clamp and there was noted to be adequate hemostasis. The weighted speculum was removed. The patient tolerated the procedure well, and she was taken to PACU in stable condition. Sponge, needle and instrument counts were called for and were correct x 3. Disposition:  The patient will return to my office in 2 weeks. We will review her pathology report and the intraoperative video along with any further recommendations, If pathology is limited or persistent with Complex Hyperplasia with or without ATYPIA we will refer the patient to GYN ONC due to the 2-3% risk of underlying Adenocarcinoma without Atypia and 20-30% risk with Atypia. She was counseled with her family that she is to report any temperature more than 100.4 F, pelvic pain, or heavy vaginal bleeding; She is to refrain from intercourse douching or tampons; No hot tubs baths lakes or pools. The patient voiced understanding of the above counseling.       Gordon Ortiz DO      Electronically signed by

## 2018-01-10 NOTE — H&P (VIEW-ONLY)
Reflexes normal and symmetric. Sensation grossly intact. Female Urogen:  Declined  Female Rectal: Declined    OMM Structural Component:  The patient did not complain of a Chief complaint requiring OMM. Chief Complaint:none    Structural Exam: No Interest              Diagnostics:  EXAMINATION:   PELVIC ULTRASOUND       4/12/2017       TECHNIQUE:   Transabdominal and transvaginal pelvic ultrasound was performed.       COMPARISON:   17 April 2013       HISTORY:   ORDERING SYSTEM PROVIDED HISTORY: PMB (postmenopausal bleeding) TECHNOLOGIST   PROVIDED HISTORY: Reason for exam:-&gt;postmenopausal bleeding Ordering   Physician Provided Reason for Exam: PMB Acuity: Acute Type of Exam: Initial       MEASUREMENTS:   Uterus: 8.47 x 5.03 x 4.95 cm       Endometrial stripe: 10.5 mm       Right Ovary: Not visualized and not measured       Left Ovary: Not visualized and not measured       ULTRASOUND FINDINGS:   Uterus: Uterus demonstrates a left posterior fibroid of 1.91 x 1.75 x 1.85 cm.       Endometrial stripe: Endometrial stripe is mildly prominent.       Right Ovary: Not visualized       Left Ovary: Not visualized       Free Fluid: No evidence of free fluid.           Impression   Left posterior fibroid of 1.9 x 1.75 x 1.85 cm. Endometrial cavity is mildly   prominent for age with endometrial pathology of concern. Neither ovary is   visualized.       RECOMMENDATIONS:   Follow-up studies as clinically indicated.          Lab Results:  Results for orders placed or performed in visit on 07/19/17   POC URINE with Microscopic   Result Value Ref Range    Color, UA Yellow     Clarity, UA Clear Clear    Glucose, Ur neg     Bilirubin Urine 0 mg/dL    Ketones, Urine Negative     Specific Gravity, UA 1.025 1.005 - 1.030    Blood, Urine Positive     pH, UA 5.0 4.5 - 8.0    Protein, UA Negative Negative    Nitrite, Urine Negative     Leukocytes, UA large     Urobilinogen, Urine Normal     rbc urine, poc      wbc urine, poc      bacteria urine, poc      yeast urine, poc      casts urine, poc      epi cells urine, poc      crystals urine, poc             Assessment:    1. PMB (postmenopausal bleeding)     2. 7/17/17 Hysto, D/C, Myosure      3. Uterine hyperplasia Simple and complex WITHOUT ATYPIA     4. History of myocardial infarction     5. CAD S/P percutaneous coronary angioplasty 2009 WITH STENT     6. S/P tubal ligation     7. Essential hypertension     8. Type 2 diabetes mellitus without complication, without long-term current use of insulin ([address]     9. History of total left knee replacement     10. Chronic blood thinners    Patient Active Problem List    Diagnosis Date Noted    Uterine hyperplasia Simple and complex WITHOUT ATYPIA (7/17/2017) 09/19/2017     Priority: High     Overview Note:     Discussed progesterone therapy and progression risk to Uterine cancer. Pt declined and medical intervention or hysterectomy due to medical co-morbidities. Plan repeat sampling of uterus in 6-9 months to re-evaluate.       History of total left knee replacement 09/19/2017     Priority: High    PMB (postmenopausal bleeding) 05/16/2013     Priority: High    History of myocardial infarction      Priority: High     Overview Note:     STENTS Prophylaxis if SURGERY      Hypertension      Priority: High    Tobacco abuse      Priority: High    7/17/17 Hysto, D/C, Myosure  07/17/2017     Priority: Medium    S/P tubal ligation 05/16/2013     Priority: Medium    Hypercholesterolemia      Priority: Medium    Diverticulitis      Priority: Medium    Obesity      Priority: Medium    Arthritis      Priority: Low    Bruises easily      Priority: Low    UTI (urinary tract infection) 07/17/2017    Fibroid uterus 07/17/2017    H/O dilation and curettage 07/17/2017    CAD S/P percutaneous coronary angioplasty 06/08/2017    Type 2 diabetes mellitus without complication, without long-term current use of insulin ([address] 06/08/2017    CKD (chronic kidney disease) stage 3, GFR 30-59 ml/min 06/08/2017    Diabetic polyneuropathy associated with type 2 diabetes mellitus ([facility] [address] 06/08/2017    Morbid obesity due to excess calories ([facility] 06/08/2017    SOB (shortness of breath) 06/20/2016    DDD (degenerative disc disease)     Neuropathy (HCC)        PAP: WNL 4/2017 neg HPV  Permanent Sterilization Completed: Yes     Plan:  1. D&C Hysteroscopy possible Myosure  2. Surgical clearance obtained  3. Stop Plavix 5 days prior to OR . OK to continue ASA 81 mg/dy  No orders of the defined types were placed in this encounter. Counseling: The patient was counseled on all options both medical and surgical, conservative as well as definitive. She has elected to proceed with the procedure as stated above. The patient was counseled on the procedure. Risks and complications were reviewed in detail. The patients orders, labs, consents have been completed. The history and physical as well as all supporting surgical documentation will be forwarded to the pre-operative holding area. The patient is aware that this procedure may not alleviate her symptoms. That there may be a necessity for a second surgery and that there may be an incomplete removal of abnormal tissue.                    ________________________________________D. O.  Date:_______________  Anna Course, DO

## 2018-01-12 LAB — SURGICAL PATHOLOGY REPORT: NORMAL

## 2018-01-23 ENCOUNTER — TELEPHONE (OUTPATIENT)
Dept: INTERNAL MEDICINE CLINIC | Age: 63
End: 2018-01-23

## 2018-01-30 ENCOUNTER — OFFICE VISIT (OUTPATIENT)
Dept: OBGYN CLINIC | Age: 63
End: 2018-01-30
Payer: MEDICARE

## 2018-01-30 VITALS
HEART RATE: 78 BPM | SYSTOLIC BLOOD PRESSURE: 128 MMHG | WEIGHT: 245 LBS | DIASTOLIC BLOOD PRESSURE: 78 MMHG | BODY MASS INDEX: 38.45 KG/M2 | RESPIRATION RATE: 20 BRPM | HEIGHT: 67 IN

## 2018-01-30 DIAGNOSIS — Z09 POSTOP CHECK: Primary | ICD-10-CM

## 2018-01-30 DIAGNOSIS — B99.9 SUPERINFECTION: ICD-10-CM

## 2018-01-30 DIAGNOSIS — N85.2 UTERINE HYPERPLASIA: ICD-10-CM

## 2018-01-30 DIAGNOSIS — Z98.890 HISTORY OF D&C: ICD-10-CM

## 2018-01-30 PROCEDURE — 3014F SCREEN MAMMO DOC REV: CPT | Performed by: OBSTETRICS & GYNECOLOGY

## 2018-01-30 PROCEDURE — 1036F TOBACCO NON-USER: CPT | Performed by: OBSTETRICS & GYNECOLOGY

## 2018-01-30 PROCEDURE — G8417 CALC BMI ABV UP PARAM F/U: HCPCS | Performed by: OBSTETRICS & GYNECOLOGY

## 2018-01-30 PROCEDURE — 3017F COLORECTAL CA SCREEN DOC REV: CPT | Performed by: OBSTETRICS & GYNECOLOGY

## 2018-01-30 PROCEDURE — G8598 ASA/ANTIPLAT THER USED: HCPCS | Performed by: OBSTETRICS & GYNECOLOGY

## 2018-01-30 PROCEDURE — G8427 DOCREV CUR MEDS BY ELIG CLIN: HCPCS | Performed by: OBSTETRICS & GYNECOLOGY

## 2018-01-30 PROCEDURE — 99213 OFFICE O/P EST LOW 20 MIN: CPT | Performed by: OBSTETRICS & GYNECOLOGY

## 2018-01-30 PROCEDURE — G8484 FLU IMMUNIZE NO ADMIN: HCPCS | Performed by: OBSTETRICS & GYNECOLOGY

## 2018-01-30 RX ORDER — FLUCONAZOLE 150 MG/1
150 TABLET ORAL ONCE
Qty: 1 TABLET | Refills: 0 | Status: SHIPPED | OUTPATIENT
Start: 2018-01-30 | End: 2018-01-30

## 2018-01-30 NOTE — PROGRESS NOTES
Marco A Bello  2018  11:02 AM      Marco A Bello  Procedure: 1/10/2018  D&C hysteroscopy      Marco A Bello is a 58 y.o. female       The patient was seen, she denies any complaints. She denied any shortness of breath, chest pain or dizziness. She denied any nausea, vomiting, or diarrhea. There is no fever, chills, or rigors. The patient denies any vaginal bleeding, discharge or odor. All of her pre-operative complaints are now resolved. Requesting diflucan for yeast after iv abx preop. Blood pressure 128/78, pulse 78, resp. rate 20, height 5' 7\" (1.702 m), weight 245 lb (111.1 kg), last menstrual period 2002, not currently breastfeeding. Abdominal Exam: soft non-tender. Good bowel sounds. No guarding, rebound or rigidity. No costal vertebral angle tenderness bilateral. No hernias    Incision: na    Extremities: No edema or calf pain noted bilaterally. Pelvic Exam: Declined      Results for orders placed or performed during the hospital encounter of 01/10/18   Surgical Pathology   Result Value Ref Range    Surgical Pathology Report       (NOTE)  [address] [place] [address].   [place] [address].  [phone]  Fax: [phone]  SURGICAL PATHOLOGY REPORT    Patient Name: Lcuia Key  MR#: [id number]  Specimen #[id number]      Final Diagnosis  SPECIMEN \"A\":  ENDOMETRIUM, CURETTINGS:        STRIPS OF INACTIVE ENDOMETRIUM WITH CILIATED CELL CHANGE AND  SYNCYTIAL METAPLASIA    FRAGMENTS OF BENIGN SQUAMOUS MUCOSA WITH CHRONIC FOCAL ACTIVE  INFLAMMATION    NO HYPERPLASIA OR ATYPIA IS SEEN     SPECIMEN \"B\":  ENDOCERVIX, CURETTINGS:        SCANT STRIPS OF COLUMNAR MUCOSA    SCANT FRAGMENTS OF SQUAMOUS MUCOSA WITH ATROPHIC CHANGE AND CHRONIC  INFLAMMATION    NO DYSPLASIA IS SEEN      Jeri Moraes M.D.  **Electronically Signed Out**         /2018    Clinical Information  Postmenopausal bleeding; dilatation and curettage

## 2018-02-05 ENCOUNTER — HOSPITAL ENCOUNTER (OUTPATIENT)
Age: 63
Discharge: HOME | End: 2018-02-05
Payer: MEDICARE

## 2018-02-05 DIAGNOSIS — E13.22 SECONDARY DIABETES MELLITUS WITH STAGE 3 CHRONIC KIDNEY DISEASE (HCC): ICD-10-CM

## 2018-02-05 DIAGNOSIS — N18.30 SECONDARY DIABETES MELLITUS WITH STAGE 3 CHRONIC KIDNEY DISEASE (HCC): ICD-10-CM

## 2018-02-05 DIAGNOSIS — N18.30 CKD (CHRONIC KIDNEY DISEASE) STAGE 3, GFR 30-59 ML/MIN (HCC): ICD-10-CM

## 2018-02-05 DIAGNOSIS — N18.30 BENIGN HYPERTENSION WITH CKD (CHRONIC KIDNEY DISEASE) STAGE III (HCC): ICD-10-CM

## 2018-02-05 DIAGNOSIS — I12.9 BENIGN HYPERTENSION WITH CKD (CHRONIC KIDNEY DISEASE) STAGE III (HCC): ICD-10-CM

## 2018-02-05 LAB
ABSOLUTE EOS #: 0.3 K/UL (ref 0–0.4)
ABSOLUTE IMMATURE GRANULOCYTE: ABNORMAL K/UL (ref 0–0.3)
ABSOLUTE LYMPH #: 1.4 K/UL (ref 1–4.8)
ABSOLUTE MONO #: 0.4 K/UL (ref 0.1–1.3)
ALBUMIN SERPL-MCNC: 3.5 G/DL (ref 3.5–5.2)
ALBUMIN/GLOBULIN RATIO: ABNORMAL (ref 1–2.5)
ALP BLD-CCNC: 75 U/L (ref 35–104)
ALT SERPL-CCNC: 16 U/L (ref 5–33)
ANION GAP SERPL CALCULATED.3IONS-SCNC: 11 MMOL/L (ref 9–17)
AST SERPL-CCNC: 17 U/L
BASOPHILS # BLD: 1 % (ref 0–2)
BASOPHILS ABSOLUTE: 0.1 K/UL (ref 0–0.2)
BILIRUB SERPL-MCNC: 0.41 MG/DL (ref 0.3–1.2)
BUN BLDV-MCNC: 14 MG/DL (ref 8–23)
BUN/CREAT BLD: ABNORMAL (ref 9–20)
CALCIUM SERPL-MCNC: 10 MG/DL (ref 8.6–10.4)
CHLORIDE BLD-SCNC: 101 MMOL/L (ref 98–107)
CO2: 27 MMOL/L (ref 20–31)
CREAT SERPL-MCNC: 1.06 MG/DL (ref 0.5–0.9)
CREATININE URINE: 253.7 MG/DL (ref 28–217)
DIFFERENTIAL TYPE: ABNORMAL
EOSINOPHILS RELATIVE PERCENT: 4 % (ref 0–4)
GFR AFRICAN AMERICAN: >60 ML/MIN
GFR NON-AFRICAN AMERICAN: 53 ML/MIN
GFR SERPL CREATININE-BSD FRML MDRD: ABNORMAL ML/MIN/{1.73_M2}
GFR SERPL CREATININE-BSD FRML MDRD: ABNORMAL ML/MIN/{1.73_M2}
GLUCOSE BLD-MCNC: 168 MG/DL (ref 70–99)
HCT VFR BLD CALC: 42.7 % (ref 36–46)
HEMOGLOBIN: 14.1 G/DL (ref 12–16)
IMMATURE GRANULOCYTES: ABNORMAL %
LYMPHOCYTES # BLD: 18 % (ref 24–44)
MAGNESIUM: 1.9 MG/DL (ref 1.6–2.6)
MCH RBC QN AUTO: 28.9 PG (ref 26–34)
MCHC RBC AUTO-ENTMCNC: 33 G/DL (ref 31–37)
MCV RBC AUTO: 87.6 FL (ref 80–100)
MONOCYTES # BLD: 6 % (ref 1–7)
NRBC AUTOMATED: ABNORMAL PER 100 WBC
PDW BLD-RTO: 15.1 % (ref 11.5–14.9)
PHOSPHORUS: 2.7 MG/DL (ref 2.6–4.5)
PLATELET # BLD: 197 K/UL (ref 150–450)
PLATELET ESTIMATE: ABNORMAL
PMV BLD AUTO: 8.8 FL (ref 6–12)
POTASSIUM SERPL-SCNC: 4.4 MMOL/L (ref 3.7–5.3)
RBC # BLD: 4.87 M/UL (ref 4–5.2)
RBC # BLD: ABNORMAL 10*6/UL
SEG NEUTROPHILS: 71 % (ref 36–66)
SEGMENTED NEUTROPHILS ABSOLUTE COUNT: 5.4 K/UL (ref 1.3–9.1)
SODIUM BLD-SCNC: 139 MMOL/L (ref 135–144)
TOTAL PROTEIN, URINE: 38 MG/DL
TOTAL PROTEIN: 6.9 G/DL (ref 6.4–8.3)
URINE TOTAL PROTEIN CREATININE RATIO: 0.15 (ref 0–0.2)
WBC # BLD: 7.6 K/UL (ref 3.5–11)
WBC # BLD: ABNORMAL 10*3/UL

## 2018-02-05 PROCEDURE — 84100 ASSAY OF PHOSPHORUS: CPT

## 2018-02-05 PROCEDURE — 85025 COMPLETE CBC W/AUTO DIFF WBC: CPT

## 2018-02-05 PROCEDURE — 84156 ASSAY OF PROTEIN URINE: CPT

## 2018-02-05 PROCEDURE — 82570 ASSAY OF URINE CREATININE: CPT

## 2018-02-05 PROCEDURE — 36415 COLL VENOUS BLD VENIPUNCTURE: CPT

## 2018-02-05 PROCEDURE — 83735 ASSAY OF MAGNESIUM: CPT

## 2018-02-05 PROCEDURE — 80053 COMPREHEN METABOLIC PANEL: CPT

## 2018-02-05 NOTE — TELEPHONE ENCOUNTER
Telephone Outcome: Other - Pt stated she will come into the office sometime soon to get it checked. Mauro Piedra

## 2018-05-01 ENCOUNTER — OFFICE VISIT (OUTPATIENT)
Dept: OBGYN CLINIC | Age: 63
End: 2018-05-01
Payer: MEDICARE

## 2018-05-01 ENCOUNTER — HOSPITAL ENCOUNTER (OUTPATIENT)
Age: 63
Setting detail: SPECIMEN
Discharge: HOME | End: 2018-05-01
Payer: MEDICARE

## 2018-05-01 VITALS
HEIGHT: 68 IN | DIASTOLIC BLOOD PRESSURE: 72 MMHG | HEART RATE: 92 BPM | WEIGHT: 244 LBS | SYSTOLIC BLOOD PRESSURE: 118 MMHG | BODY MASS INDEX: 36.98 KG/M2

## 2018-05-01 DIAGNOSIS — Z12.39 SCREENING FOR BREAST CANCER: ICD-10-CM

## 2018-05-01 DIAGNOSIS — N89.8 VAGINAL DISCHARGE: ICD-10-CM

## 2018-05-01 DIAGNOSIS — Z01.419 CERVICAL SMEAR, AS PART OF ROUTINE GYNECOLOGICAL EXAMINATION: ICD-10-CM

## 2018-05-01 DIAGNOSIS — N63.15 BREAST LUMP ON RIGHT SIDE AT 3 O'CLOCK POSITION: ICD-10-CM

## 2018-05-01 DIAGNOSIS — M85.80 OSTEOPENIA DETERMINED BY X-RAY: ICD-10-CM

## 2018-05-01 DIAGNOSIS — Z11.51 SPECIAL SCREENING EXAMINATION FOR HUMAN PAPILLOMAVIRUS (HPV): ICD-10-CM

## 2018-05-01 DIAGNOSIS — R35.0 FREQUENCY OF URINATION: ICD-10-CM

## 2018-05-01 DIAGNOSIS — Z01.419 CERVICAL SMEAR, AS PART OF ROUTINE GYNECOLOGICAL EXAMINATION: Primary | ICD-10-CM

## 2018-05-01 LAB
-: ABNORMAL
AMORPHOUS: ABNORMAL
BACTERIA: ABNORMAL
BILIRUBIN URINE: ABNORMAL
CASTS UA: ABNORMAL /LPF
COLOR: YELLOW
COMMENT UA: ABNORMAL
CRYSTALS, UA: ABNORMAL /HPF
EPITHELIAL CELLS UA: ABNORMAL /HPF
GLUCOSE URINE: NEGATIVE
KETONES, URINE: NEGATIVE
LEUKOCYTE ESTERASE, URINE: ABNORMAL
MUCUS: ABNORMAL
NITRITE, URINE: NEGATIVE
OTHER OBSERVATIONS UA: ABNORMAL
PH UA: 5.5 (ref 5–8)
PROTEIN UA: NEGATIVE
RBC UA: ABNORMAL /HPF
RENAL EPITHELIAL, UA: ABNORMAL /HPF
SPECIFIC GRAVITY UA: 1.02 (ref 1–1.03)
TRICHOMONAS: ABNORMAL
TURBIDITY: ABNORMAL
URINE HGB: NEGATIVE
UROBILINOGEN, URINE: NORMAL
WBC UA: ABNORMAL /HPF
YEAST: ABNORMAL

## 2018-05-01 PROCEDURE — 87624 HPV HI-RISK TYP POOLED RSLT: CPT

## 2018-05-01 PROCEDURE — 87086 URINE CULTURE/COLONY COUNT: CPT

## 2018-05-01 PROCEDURE — 87070 CULTURE OTHR SPECIMN AEROBIC: CPT

## 2018-05-01 PROCEDURE — 88175 CYTOPATH C/V AUTO FLUID REDO: CPT

## 2018-05-01 PROCEDURE — 86403 PARTICLE AGGLUT ANTBDY SCRN: CPT

## 2018-05-01 PROCEDURE — 99396 PREV VISIT EST AGE 40-64: CPT | Performed by: NURSE PRACTITIONER

## 2018-05-01 PROCEDURE — 81001 URINALYSIS AUTO W/SCOPE: CPT

## 2018-05-01 RX ORDER — FLUCONAZOLE 150 MG/1
150 TABLET ORAL ONCE
Qty: 2 TABLET | Refills: 0 | Status: SHIPPED | OUTPATIENT
Start: 2018-05-01 | End: 2018-05-01

## 2018-05-01 RX ORDER — CLOTRIMAZOLE AND BETAMETHASONE DIPROPIONATE 10; .64 MG/G; MG/G
CREAM TOPICAL
Qty: 1 TUBE | Refills: 2 | Status: SHIPPED | OUTPATIENT
Start: 2018-05-01 | End: 2020-09-21

## 2018-05-01 ASSESSMENT — PATIENT HEALTH QUESTIONNAIRE - PHQ9
SUM OF ALL RESPONSES TO PHQ9 QUESTIONS 1 & 2: 0
2. FEELING DOWN, DEPRESSED OR HOPELESS: 0
SUM OF ALL RESPONSES TO PHQ QUESTIONS 1-9: 0
1. LITTLE INTEREST OR PLEASURE IN DOING THINGS: 0

## 2018-05-02 LAB
CULTURE: NORMAL
CULTURE: NORMAL
HPV SAMPLE: NORMAL
HPV SOURCE: NORMAL
HPV, GENOTYPE 16: NOT DETECTED
HPV, GENOTYPE 18: NOT DETECTED
HPV, HIGH RISK OTHER: NOT DETECTED
HPV, INTERPRETATION: NORMAL
Lab: NORMAL
SPECIMEN DESCRIPTION: NORMAL
SPECIMEN DESCRIPTION: NORMAL
STATUS: NORMAL

## 2018-05-04 LAB
CULTURE: ABNORMAL
Lab: ABNORMAL
SPECIMEN DESCRIPTION: ABNORMAL
SPECIMEN DESCRIPTION: ABNORMAL
STATUS: ABNORMAL

## 2018-05-09 ENCOUNTER — HOSPITAL ENCOUNTER (OUTPATIENT)
Dept: WOMENS IMAGING | Age: 63
Discharge: HOME | End: 2018-05-11
Payer: MEDICARE

## 2018-05-09 ENCOUNTER — TELEPHONE (OUTPATIENT)
Dept: OBGYN CLINIC | Age: 63
End: 2018-05-09

## 2018-05-09 DIAGNOSIS — N63.15 BREAST LUMP ON RIGHT SIDE AT 3 O'CLOCK POSITION: ICD-10-CM

## 2018-05-09 DIAGNOSIS — Z12.39 SCREENING FOR BREAST CANCER: ICD-10-CM

## 2018-05-09 DIAGNOSIS — M85.80 OSTEOPENIA DETERMINED BY X-RAY: ICD-10-CM

## 2018-05-09 DIAGNOSIS — N63.0 LUMP IN FEMALE BREAST: ICD-10-CM

## 2018-05-09 DIAGNOSIS — Z01.419 CERVICAL SMEAR, AS PART OF ROUTINE GYNECOLOGICAL EXAMINATION: ICD-10-CM

## 2018-05-09 PROCEDURE — G0279 TOMOSYNTHESIS, MAMMO: HCPCS

## 2018-05-09 PROCEDURE — 77080 DXA BONE DENSITY AXIAL: CPT

## 2018-05-09 PROCEDURE — 76642 ULTRASOUND BREAST LIMITED: CPT

## 2018-05-23 LAB — CYTOLOGY REPORT: NORMAL

## 2018-07-23 ENCOUNTER — HOSPITAL ENCOUNTER (OUTPATIENT)
Age: 63
Discharge: HOME | End: 2018-07-23
Payer: MEDICARE

## 2018-07-23 LAB
ALBUMIN SERPL-MCNC: 4 G/DL (ref 3.5–5.2)
ALBUMIN/GLOBULIN RATIO: ABNORMAL (ref 1–2.5)
ALP BLD-CCNC: 74 U/L (ref 35–104)
ALT SERPL-CCNC: 14 U/L (ref 5–33)
ANION GAP SERPL CALCULATED.3IONS-SCNC: 14 MMOL/L (ref 9–17)
AST SERPL-CCNC: 21 U/L
BILIRUB SERPL-MCNC: 0.32 MG/DL (ref 0.3–1.2)
BUN BLDV-MCNC: 21 MG/DL (ref 8–23)
BUN/CREAT BLD: ABNORMAL (ref 9–20)
CALCIUM SERPL-MCNC: 9.7 MG/DL (ref 8.6–10.4)
CHLORIDE BLD-SCNC: 103 MMOL/L (ref 98–107)
CHOLESTEROL/HDL RATIO: 2.6
CHOLESTEROL: 103 MG/DL
CO2: 23 MMOL/L (ref 20–31)
CREAT SERPL-MCNC: 1.08 MG/DL (ref 0.5–0.9)
CREATININE URINE: 216.3 MG/DL (ref 28–217)
ESTRADIOL LEVEL: <5 PG/ML (ref 5–50)
FOLLICLE STIMULATING HORMONE: 53.2 U/L (ref 25.8–134.8)
GFR AFRICAN AMERICAN: >60 ML/MIN
GFR NON-AFRICAN AMERICAN: 51 ML/MIN
GFR SERPL CREATININE-BSD FRML MDRD: ABNORMAL ML/MIN/{1.73_M2}
GFR SERPL CREATININE-BSD FRML MDRD: ABNORMAL ML/MIN/{1.73_M2}
GLUCOSE BLD-MCNC: 98 MG/DL (ref 70–99)
HDLC SERPL-MCNC: 40 MG/DL
LDL CHOLESTEROL: 35 MG/DL (ref 0–130)
LH: 34.2 U/L (ref 7.7–58.5)
MICROALBUMIN/CREAT 24H UR: 44 MG/L
MICROALBUMIN/CREAT UR-RTO: 20 MCG/MG CREAT
POTASSIUM SERPL-SCNC: 4.6 MMOL/L (ref 3.7–5.3)
PROLACTIN: 10.05 UG/L (ref 4.79–23.3)
SODIUM BLD-SCNC: 140 MMOL/L (ref 135–144)
T3 FREE: 2.92 PG/ML (ref 2.02–4.43)
THYROGLOBULIN AB: <20 IU/ML (ref 0–40)
THYROID PEROXIDASE (TPO) AB: 55.2 IU/ML (ref 0–35)
THYROXINE, FREE: 1.22 NG/DL (ref 0.93–1.7)
TOTAL PROTEIN: 7.2 G/DL (ref 6.4–8.3)
TRIGL SERPL-MCNC: 140 MG/DL
TSH SERPL DL<=0.05 MIU/L-ACNC: 1.61 MIU/L (ref 0.3–5)
VLDLC SERPL CALC-MCNC: ABNORMAL MG/DL (ref 1–30)

## 2018-07-23 PROCEDURE — 83835 ASSAY OF METANEPHRINES: CPT

## 2018-07-23 PROCEDURE — 36415 COLL VENOUS BLD VENIPUNCTURE: CPT

## 2018-07-23 PROCEDURE — 84481 FREE ASSAY (FT-3): CPT

## 2018-07-23 PROCEDURE — 80061 LIPID PANEL: CPT

## 2018-07-23 PROCEDURE — 82670 ASSAY OF TOTAL ESTRADIOL: CPT

## 2018-07-23 PROCEDURE — 86800 THYROGLOBULIN ANTIBODY: CPT

## 2018-07-23 PROCEDURE — 83001 ASSAY OF GONADOTROPIN (FSH): CPT

## 2018-07-23 PROCEDURE — 84443 ASSAY THYROID STIM HORMONE: CPT

## 2018-07-23 PROCEDURE — 86376 MICROSOMAL ANTIBODY EACH: CPT

## 2018-07-23 PROCEDURE — 82043 UR ALBUMIN QUANTITATIVE: CPT

## 2018-07-23 PROCEDURE — 84439 ASSAY OF FREE THYROXINE: CPT

## 2018-07-23 PROCEDURE — 82570 ASSAY OF URINE CREATININE: CPT

## 2018-07-23 PROCEDURE — 84146 ASSAY OF PROLACTIN: CPT

## 2018-07-23 PROCEDURE — 80053 COMPREHEN METABOLIC PANEL: CPT

## 2018-07-23 PROCEDURE — 83002 ASSAY OF GONADOTROPIN (LH): CPT

## 2018-07-26 LAB
METANEPH/PLASMA INTERP: NORMAL
METANEPHRINE: 0.32 NMOL/L (ref 0–0.49)
NORMETANEPHRINE PLASMA: 0.54 NMOL/L (ref 0–0.89)

## 2018-07-27 ENCOUNTER — HOSPITAL ENCOUNTER (OUTPATIENT)
Dept: ULTRASOUND IMAGING | Age: 63
Discharge: HOME | End: 2018-07-29
Payer: MEDICARE

## 2018-07-27 DIAGNOSIS — R22.1 NECK MASS: ICD-10-CM

## 2018-09-24 ENCOUNTER — HOSPITAL ENCOUNTER (OUTPATIENT)
Age: 63
Discharge: HOME | End: 2018-09-24
Payer: MEDICARE

## 2018-09-24 DIAGNOSIS — N18.30 CKD (CHRONIC KIDNEY DISEASE) STAGE 3, GFR 30-59 ML/MIN (HCC): ICD-10-CM

## 2018-09-24 DIAGNOSIS — E13.22 SECONDARY DIABETES MELLITUS WITH STAGE 3 CHRONIC KIDNEY DISEASE (HCC): ICD-10-CM

## 2018-09-24 DIAGNOSIS — N18.30 SECONDARY DIABETES MELLITUS WITH STAGE 3 CHRONIC KIDNEY DISEASE (HCC): ICD-10-CM

## 2018-09-24 DIAGNOSIS — N18.30 BENIGN HYPERTENSION WITH CKD (CHRONIC KIDNEY DISEASE) STAGE III (HCC): ICD-10-CM

## 2018-09-24 DIAGNOSIS — I12.9 BENIGN HYPERTENSION WITH CKD (CHRONIC KIDNEY DISEASE) STAGE III (HCC): ICD-10-CM

## 2018-09-24 LAB
ABSOLUTE EOS #: 0.2 K/UL (ref 0–0.4)
ABSOLUTE IMMATURE GRANULOCYTE: ABNORMAL K/UL (ref 0–0.3)
ABSOLUTE LYMPH #: 1.1 K/UL (ref 1–4.8)
ABSOLUTE MONO #: 0.3 K/UL (ref 0.1–1.3)
ANION GAP SERPL CALCULATED.3IONS-SCNC: 13 MMOL/L (ref 9–17)
BASOPHILS # BLD: 1 % (ref 0–2)
BASOPHILS ABSOLUTE: 0 K/UL (ref 0–0.2)
BUN BLDV-MCNC: 14 MG/DL (ref 8–23)
BUN/CREAT BLD: ABNORMAL (ref 9–20)
CALCIUM SERPL-MCNC: 9.9 MG/DL (ref 8.6–10.4)
CHLORIDE BLD-SCNC: 102 MMOL/L (ref 98–107)
CO2: 25 MMOL/L (ref 20–31)
CREAT SERPL-MCNC: 1.14 MG/DL (ref 0.5–0.9)
DIFFERENTIAL TYPE: ABNORMAL
EOSINOPHILS RELATIVE PERCENT: 4 % (ref 0–4)
GFR AFRICAN AMERICAN: 59 ML/MIN
GFR NON-AFRICAN AMERICAN: 48 ML/MIN
GFR SERPL CREATININE-BSD FRML MDRD: ABNORMAL ML/MIN/{1.73_M2}
GFR SERPL CREATININE-BSD FRML MDRD: ABNORMAL ML/MIN/{1.73_M2}
GLUCOSE BLD-MCNC: 197 MG/DL (ref 70–99)
HCT VFR BLD CALC: 42.4 % (ref 36–46)
HEMOGLOBIN: 14 G/DL (ref 12–16)
IMMATURE GRANULOCYTES: ABNORMAL %
LYMPHOCYTES # BLD: 18 % (ref 24–44)
MAGNESIUM: 1.7 MG/DL (ref 1.6–2.6)
MCH RBC QN AUTO: 28.5 PG (ref 26–34)
MCHC RBC AUTO-ENTMCNC: 33 G/DL (ref 31–37)
MCV RBC AUTO: 86.6 FL (ref 80–100)
MONOCYTES # BLD: 4 % (ref 1–7)
NRBC AUTOMATED: ABNORMAL PER 100 WBC
PDW BLD-RTO: 19.7 % (ref 11.5–14.9)
PHOSPHORUS: 2.2 MG/DL (ref 2.6–4.5)
PLATELET # BLD: 186 K/UL (ref 150–450)
PLATELET ESTIMATE: ABNORMAL
PMV BLD AUTO: 8.8 FL (ref 6–12)
POTASSIUM SERPL-SCNC: 3.9 MMOL/L (ref 3.7–5.3)
RBC # BLD: 4.9 M/UL (ref 4–5.2)
RBC # BLD: ABNORMAL 10*6/UL
SEG NEUTROPHILS: 73 % (ref 36–66)
SEGMENTED NEUTROPHILS ABSOLUTE COUNT: 4.5 K/UL (ref 1.3–9.1)
SODIUM BLD-SCNC: 140 MMOL/L (ref 135–144)
TOTAL PROTEIN, URINE: 44 MG/DL
VITAMIN D 25-HYDROXY: 18.5 NG/ML (ref 30–100)
WBC # BLD: 6.1 K/UL (ref 3.5–11)
WBC # BLD: ABNORMAL 10*3/UL

## 2018-09-24 PROCEDURE — 84100 ASSAY OF PHOSPHORUS: CPT

## 2018-09-24 PROCEDURE — 82306 VITAMIN D 25 HYDROXY: CPT

## 2018-09-24 PROCEDURE — 84156 ASSAY OF PROTEIN URINE: CPT

## 2018-09-24 PROCEDURE — 80048 BASIC METABOLIC PNL TOTAL CA: CPT

## 2018-09-24 PROCEDURE — 36415 COLL VENOUS BLD VENIPUNCTURE: CPT

## 2018-09-24 PROCEDURE — 83735 ASSAY OF MAGNESIUM: CPT

## 2018-09-24 PROCEDURE — 85025 COMPLETE CBC W/AUTO DIFF WBC: CPT

## 2018-09-26 PROBLEM — N39.0 UTI (URINARY TRACT INFECTION): Status: RESOLVED | Noted: 2017-07-17 | Resolved: 2018-09-26

## 2018-10-04 ENCOUNTER — OFFICE VISIT (OUTPATIENT)
Dept: GASTROENTEROLOGY | Age: 63
End: 2018-10-04
Payer: MEDICARE

## 2018-10-04 ENCOUNTER — HOSPITAL ENCOUNTER (OUTPATIENT)
Age: 63
Discharge: HOME | End: 2018-10-04
Payer: MEDICARE

## 2018-10-04 VITALS
BODY MASS INDEX: 35.9 KG/M2 | HEART RATE: 80 BPM | SYSTOLIC BLOOD PRESSURE: 162 MMHG | DIASTOLIC BLOOD PRESSURE: 88 MMHG | WEIGHT: 243.1 LBS

## 2018-10-04 DIAGNOSIS — D64.9 ANEMIA, UNSPECIFIED TYPE: ICD-10-CM

## 2018-10-04 DIAGNOSIS — K92.1 MELENA: ICD-10-CM

## 2018-10-04 DIAGNOSIS — E83.39 HYPOPHOSPHATEMIA: ICD-10-CM

## 2018-10-04 DIAGNOSIS — N39.0 RECURRENT UTI: ICD-10-CM

## 2018-10-04 DIAGNOSIS — E55.9 VITAMIN D DEFICIENCY: ICD-10-CM

## 2018-10-04 DIAGNOSIS — E66.8 MODERATE OBESITY: Primary | ICD-10-CM

## 2018-10-04 DIAGNOSIS — N18.30 CKD (CHRONIC KIDNEY DISEASE) STAGE 3, GFR 30-59 ML/MIN (HCC): ICD-10-CM

## 2018-10-04 DIAGNOSIS — N18.30 SECONDARY DIABETES MELLITUS WITH STAGE 3 CHRONIC KIDNEY DISEASE (GFR 30-59) (HCC): ICD-10-CM

## 2018-10-04 DIAGNOSIS — K58.2 IRRITABLE BOWEL SYNDROME WITH BOTH CONSTIPATION AND DIARRHEA: ICD-10-CM

## 2018-10-04 DIAGNOSIS — I12.9 BENIGN HYPERTENSION WITH CKD (CHRONIC KIDNEY DISEASE) STAGE III (HCC): ICD-10-CM

## 2018-10-04 DIAGNOSIS — K62.5 RECTAL BLEEDING: ICD-10-CM

## 2018-10-04 DIAGNOSIS — N18.30 BENIGN HYPERTENSION WITH CKD (CHRONIC KIDNEY DISEASE) STAGE III (HCC): ICD-10-CM

## 2018-10-04 DIAGNOSIS — E13.22 SECONDARY DIABETES MELLITUS WITH STAGE 3 CHRONIC KIDNEY DISEASE (GFR 30-59) (HCC): ICD-10-CM

## 2018-10-04 DIAGNOSIS — K59.00 CONSTIPATION, UNSPECIFIED CONSTIPATION TYPE: ICD-10-CM

## 2018-10-04 PROCEDURE — G8427 DOCREV CUR MEDS BY ELIG CLIN: HCPCS | Performed by: INTERNAL MEDICINE

## 2018-10-04 PROCEDURE — G8484 FLU IMMUNIZE NO ADMIN: HCPCS | Performed by: INTERNAL MEDICINE

## 2018-10-04 PROCEDURE — 3017F COLORECTAL CA SCREEN DOC REV: CPT | Performed by: INTERNAL MEDICINE

## 2018-10-04 PROCEDURE — 87088 URINE BACTERIA CULTURE: CPT

## 2018-10-04 PROCEDURE — 99214 OFFICE O/P EST MOD 30 MIN: CPT | Performed by: INTERNAL MEDICINE

## 2018-10-04 PROCEDURE — 87086 URINE CULTURE/COLONY COUNT: CPT

## 2018-10-04 PROCEDURE — G8417 CALC BMI ABV UP PARAM F/U: HCPCS | Performed by: INTERNAL MEDICINE

## 2018-10-04 PROCEDURE — 87186 SC STD MICRODIL/AGAR DIL: CPT

## 2018-10-04 RX ORDER — ISOSORBIDE MONONITRATE 60 MG/1
120 TABLET, EXTENDED RELEASE ORAL DAILY
COMMUNITY
Start: 2018-09-13

## 2018-10-04 ASSESSMENT — ENCOUNTER SYMPTOMS
ABDOMINAL DISTENTION: 0
COUGH: 0
ANAL BLEEDING: 0
ABDOMINAL PAIN: 1
RECTAL PAIN: 0
BACK PAIN: 1
RESPIRATORY NEGATIVE: 1
ALLERGIC/IMMUNOLOGIC NEGATIVE: 1
WHEEZING: 0
VOMITING: 0
CONSTIPATION: 1
VOICE CHANGE: 0
BLOOD IN STOOL: 0
TROUBLE SWALLOWING: 0
DIARRHEA: 0
CHOKING: 0
NAUSEA: 0
SORE THROAT: 0
SINUS PRESSURE: 0

## 2018-10-06 LAB
CULTURE: ABNORMAL
Lab: ABNORMAL
ORGANISM: ABNORMAL
SPECIMEN DESCRIPTION: ABNORMAL
STATUS: ABNORMAL

## 2018-10-08 LAB
AVERAGE GLUCOSE: NORMAL
HBA1C MFR BLD: 5.6 %

## 2018-10-15 ENCOUNTER — HOSPITAL ENCOUNTER (OUTPATIENT)
Age: 63
Discharge: HOME | End: 2018-10-15
Payer: MEDICARE

## 2018-10-15 ENCOUNTER — HOSPITAL ENCOUNTER (OUTPATIENT)
Dept: ULTRASOUND IMAGING | Age: 63
Discharge: HOME | End: 2018-10-17
Payer: MEDICARE

## 2018-10-15 DIAGNOSIS — E55.9 VITAMIN D DEFICIENCY: ICD-10-CM

## 2018-10-15 DIAGNOSIS — N18.30 BENIGN HYPERTENSION WITH CKD (CHRONIC KIDNEY DISEASE) STAGE III (HCC): ICD-10-CM

## 2018-10-15 DIAGNOSIS — E13.22 SECONDARY DIABETES MELLITUS WITH STAGE 3 CHRONIC KIDNEY DISEASE (GFR 30-59) (HCC): ICD-10-CM

## 2018-10-15 DIAGNOSIS — I12.9 BENIGN HYPERTENSION WITH CKD (CHRONIC KIDNEY DISEASE) STAGE III (HCC): ICD-10-CM

## 2018-10-15 DIAGNOSIS — E83.39 HYPOPHOSPHATEMIA: ICD-10-CM

## 2018-10-15 DIAGNOSIS — N39.0 RECURRENT UTI: ICD-10-CM

## 2018-10-15 DIAGNOSIS — N18.30 SECONDARY DIABETES MELLITUS WITH STAGE 3 CHRONIC KIDNEY DISEASE (GFR 30-59) (HCC): ICD-10-CM

## 2018-10-15 DIAGNOSIS — N18.30 CKD (CHRONIC KIDNEY DISEASE) STAGE 3, GFR 30-59 ML/MIN (HCC): ICD-10-CM

## 2018-10-15 LAB — PHOSPHORUS: 2.8 MG/DL (ref 2.6–4.5)

## 2018-10-15 PROCEDURE — 76775 US EXAM ABDO BACK WALL LIM: CPT

## 2018-10-15 PROCEDURE — 84100 ASSAY OF PHOSPHORUS: CPT

## 2018-10-15 PROCEDURE — 36415 COLL VENOUS BLD VENIPUNCTURE: CPT

## 2018-10-31 ENCOUNTER — TELEPHONE (OUTPATIENT)
Dept: GASTROENTEROLOGY | Age: 63
End: 2018-10-31

## 2018-11-05 ENCOUNTER — TELEPHONE (OUTPATIENT)
Dept: GASTROENTEROLOGY | Age: 63
End: 2018-11-05

## 2018-11-05 DIAGNOSIS — Q43.8 TORTUOUS COLON: ICD-10-CM

## 2018-11-05 DIAGNOSIS — K59.00 CONSTIPATION, UNSPECIFIED CONSTIPATION TYPE: ICD-10-CM

## 2018-11-05 DIAGNOSIS — K57.92 DIVERTICULITIS: Primary | ICD-10-CM

## 2018-11-05 DIAGNOSIS — R93.3 ABNORMAL CT SCAN, COLON: ICD-10-CM

## 2018-11-13 DIAGNOSIS — K59.00 CONSTIPATION, UNSPECIFIED CONSTIPATION TYPE: ICD-10-CM

## 2018-11-13 DIAGNOSIS — E66.8 MODERATE OBESITY: ICD-10-CM

## 2018-11-13 DIAGNOSIS — K58.2 IRRITABLE BOWEL SYNDROME WITH BOTH CONSTIPATION AND DIARRHEA: ICD-10-CM

## 2018-11-13 DIAGNOSIS — D64.9 ANEMIA, UNSPECIFIED TYPE: ICD-10-CM

## 2018-11-13 DIAGNOSIS — K62.5 RECTAL BLEEDING: ICD-10-CM

## 2018-11-13 DIAGNOSIS — K92.1 MELENA: ICD-10-CM

## 2018-11-19 NOTE — TELEPHONE ENCOUNTER
Dr Juan Carlos Alcala reviewed Colon Fluoroscopy and ordered CT abd with contrast and r/s colonoscopy. CT order placed.

## 2018-11-21 PROBLEM — K22.70 BARRETT ESOPHAGUS: Status: ACTIVE | Noted: 2018-11-01

## 2019-01-02 DIAGNOSIS — K59.00 CONSTIPATION, UNSPECIFIED CONSTIPATION TYPE: ICD-10-CM

## 2019-01-02 DIAGNOSIS — K62.5 RECTAL BLEEDING: ICD-10-CM

## 2019-01-02 DIAGNOSIS — K58.2 IRRITABLE BOWEL SYNDROME WITH BOTH CONSTIPATION AND DIARRHEA: ICD-10-CM

## 2019-01-02 DIAGNOSIS — K92.1 MELENA: ICD-10-CM

## 2019-01-02 DIAGNOSIS — D64.9 ANEMIA, UNSPECIFIED TYPE: ICD-10-CM

## 2019-01-02 DIAGNOSIS — E66.8 MODERATE OBESITY: ICD-10-CM

## 2019-01-17 ENCOUNTER — TELEPHONE (OUTPATIENT)
Dept: GASTROENTEROLOGY | Age: 64
End: 2019-01-17

## 2019-02-19 ENCOUNTER — OFFICE VISIT (OUTPATIENT)
Dept: GASTROENTEROLOGY | Age: 64
End: 2019-02-19
Payer: MEDICARE

## 2019-02-19 VITALS — SYSTOLIC BLOOD PRESSURE: 185 MMHG | BODY MASS INDEX: 36.61 KG/M2 | WEIGHT: 247.9 LBS | DIASTOLIC BLOOD PRESSURE: 90 MMHG

## 2019-02-19 DIAGNOSIS — E66.01 MORBID OBESITY (HCC): ICD-10-CM

## 2019-02-19 DIAGNOSIS — K22.70 BARRETT'S ESOPHAGUS WITHOUT DYSPLASIA: Primary | ICD-10-CM

## 2019-02-19 DIAGNOSIS — K58.2 IRRITABLE BOWEL SYNDROME WITH BOTH CONSTIPATION AND DIARRHEA: ICD-10-CM

## 2019-02-19 DIAGNOSIS — R93.89 ABNORMAL FINDING ON IMAGING: ICD-10-CM

## 2019-02-19 PROBLEM — K58.9 IBS (IRRITABLE BOWEL SYNDROME): Status: ACTIVE | Noted: 2019-02-19

## 2019-02-19 PROCEDURE — 99214 OFFICE O/P EST MOD 30 MIN: CPT | Performed by: INTERNAL MEDICINE

## 2019-02-19 RX ORDER — LANOLIN ALCOHOL/MO/W.PET/CERES
325 CREAM (GRAM) TOPICAL
COMMUNITY

## 2019-02-19 RX ORDER — OMEPRAZOLE 20 MG/1
20 CAPSULE, DELAYED RELEASE ORAL
Qty: 180 CAPSULE | Refills: 1 | Status: SHIPPED | OUTPATIENT
Start: 2019-02-19 | End: 2019-02-25

## 2019-02-19 RX ORDER — COVID-19 ANTIGEN TEST
1 KIT MISCELLANEOUS 2 TIMES DAILY
COMMUNITY
End: 2019-05-28

## 2019-02-19 ASSESSMENT — ENCOUNTER SYMPTOMS
NAUSEA: 1
TROUBLE SWALLOWING: 1
ANAL BLEEDING: 0
BACK PAIN: 1
CONSTIPATION: 1
ABDOMINAL DISTENTION: 0
CHOKING: 0
SINUS PRESSURE: 1
BLOOD IN STOOL: 0
COUGH: 0
VOMITING: 1
SORE THROAT: 1
VOICE CHANGE: 0
RESPIRATORY NEGATIVE: 1
ABDOMINAL PAIN: 1
WHEEZING: 0
RECTAL PAIN: 0
ALLERGIC/IMMUNOLOGIC NEGATIVE: 1
DIARRHEA: 1

## 2019-02-25 RX ORDER — OMEPRAZOLE 40 MG/1
40 CAPSULE, DELAYED RELEASE ORAL DAILY
Qty: 30 CAPSULE | Refills: 3 | Status: SHIPPED | OUTPATIENT
Start: 2019-02-25 | End: 2019-04-29 | Stop reason: ALTCHOICE

## 2019-04-12 ENCOUNTER — TELEPHONE (OUTPATIENT)
Dept: GASTROENTEROLOGY | Age: 64
End: 2019-04-12

## 2019-04-12 NOTE — TELEPHONE ENCOUNTER
Spoke with patient's  and confirmed her procedure on 04/16/19 at [address] with arrival time of 900AM.  confirmed  and no questions regarding prep instructions.

## 2019-04-16 ENCOUNTER — ANESTHESIA EVENT (OUTPATIENT)
Dept: OPERATING ROOM | Age: 64
End: 2019-04-16
Payer: MEDICARE

## 2019-04-16 ENCOUNTER — ANESTHESIA (OUTPATIENT)
Dept: OPERATING ROOM | Age: 64
End: 2019-04-16
Payer: MEDICARE

## 2019-04-16 ENCOUNTER — HOSPITAL ENCOUNTER (OUTPATIENT)
Age: 64
Setting detail: OUTPATIENT SURGERY
Discharge: HOME | End: 2019-04-16
Attending: INTERNAL MEDICINE | Admitting: INTERNAL MEDICINE
Payer: MEDICARE

## 2019-04-16 VITALS
HEIGHT: 69 IN | RESPIRATION RATE: 16 BRPM | SYSTOLIC BLOOD PRESSURE: 189 MMHG | TEMPERATURE: 98.6 F | WEIGHT: 247 LBS | HEART RATE: 78 BPM | OXYGEN SATURATION: 97 % | BODY MASS INDEX: 36.58 KG/M2 | DIASTOLIC BLOOD PRESSURE: 85 MMHG

## 2019-04-16 VITALS — SYSTOLIC BLOOD PRESSURE: 152 MMHG | DIASTOLIC BLOOD PRESSURE: 85 MMHG | OXYGEN SATURATION: 97 %

## 2019-04-16 LAB
GLUCOSE BLD-MCNC: 101 MG/DL (ref 65–105)
GLUCOSE BLD-MCNC: 104 MG/DL (ref 65–105)

## 2019-04-16 PROCEDURE — 88305 TISSUE EXAM BY PATHOLOGIST: CPT

## 2019-04-16 PROCEDURE — 7100000001 HC PACU RECOVERY - ADDTL 15 MIN: Performed by: INTERNAL MEDICINE

## 2019-04-16 PROCEDURE — 3609010300 HC COLONOSCOPY W/BIOPSY SINGLE/MULTIPLE: Performed by: INTERNAL MEDICINE

## 2019-04-16 PROCEDURE — 82947 ASSAY GLUCOSE BLOOD QUANT: CPT

## 2019-04-16 PROCEDURE — 7100000010 HC PHASE II RECOVERY - FIRST 15 MIN: Performed by: INTERNAL MEDICINE

## 2019-04-16 PROCEDURE — 7100000011 HC PHASE II RECOVERY - ADDTL 15 MIN: Performed by: INTERNAL MEDICINE

## 2019-04-16 PROCEDURE — 3700000000 HC ANESTHESIA ATTENDED CARE: Performed by: INTERNAL MEDICINE

## 2019-04-16 PROCEDURE — 2500000003 HC RX 250 WO HCPCS: Performed by: NURSE ANESTHETIST, CERTIFIED REGISTERED

## 2019-04-16 PROCEDURE — 45380 COLONOSCOPY AND BIOPSY: CPT | Performed by: INTERNAL MEDICINE

## 2019-04-16 PROCEDURE — 2709999900 HC NON-CHARGEABLE SUPPLY: Performed by: INTERNAL MEDICINE

## 2019-04-16 PROCEDURE — 6360000002 HC RX W HCPCS: Performed by: NURSE ANESTHETIST, CERTIFIED REGISTERED

## 2019-04-16 PROCEDURE — 7100000031 HC ASPR PHASE II RECOVERY - ADDTL 15 MIN: Performed by: INTERNAL MEDICINE

## 2019-04-16 PROCEDURE — 7100000000 HC PACU RECOVERY - FIRST 15 MIN: Performed by: INTERNAL MEDICINE

## 2019-04-16 PROCEDURE — 2580000003 HC RX 258: Performed by: ANESTHESIOLOGY

## 2019-04-16 PROCEDURE — 3700000001 HC ADD 15 MINUTES (ANESTHESIA): Performed by: INTERNAL MEDICINE

## 2019-04-16 PROCEDURE — 7100000030 HC ASPR PHASE II RECOVERY - FIRST 15 MIN: Performed by: INTERNAL MEDICINE

## 2019-04-16 RX ORDER — OXYCODONE HYDROCHLORIDE AND ACETAMINOPHEN 5; 325 MG/1; MG/1
1 TABLET ORAL PRN
Status: DISCONTINUED | OUTPATIENT
Start: 2019-04-16 | End: 2019-04-16 | Stop reason: HOSPADM

## 2019-04-16 RX ORDER — LIDOCAINE HYDROCHLORIDE 10 MG/ML
INJECTION, SOLUTION EPIDURAL; INFILTRATION; INTRACAUDAL; PERINEURAL PRN
Status: DISCONTINUED | OUTPATIENT
Start: 2019-04-16 | End: 2019-04-16 | Stop reason: SDUPTHER

## 2019-04-16 RX ORDER — PROMETHAZINE HYDROCHLORIDE 25 MG/ML
6.25 INJECTION, SOLUTION INTRAMUSCULAR; INTRAVENOUS
Status: DISCONTINUED | OUTPATIENT
Start: 2019-04-16 | End: 2019-04-16

## 2019-04-16 RX ORDER — DIPHENHYDRAMINE HYDROCHLORIDE 50 MG/ML
12.5 INJECTION INTRAMUSCULAR; INTRAVENOUS
Status: DISCONTINUED | OUTPATIENT
Start: 2019-04-16 | End: 2019-04-16 | Stop reason: HOSPADM

## 2019-04-16 RX ORDER — SODIUM CHLORIDE, SODIUM LACTATE, POTASSIUM CHLORIDE, CALCIUM CHLORIDE 600; 310; 30; 20 MG/100ML; MG/100ML; MG/100ML; MG/100ML
INJECTION, SOLUTION INTRAVENOUS CONTINUOUS
Status: DISCONTINUED | OUTPATIENT
Start: 2019-04-16 | End: 2019-04-16 | Stop reason: HOSPADM

## 2019-04-16 RX ORDER — OXYCODONE HYDROCHLORIDE AND ACETAMINOPHEN 5; 325 MG/1; MG/1
2 TABLET ORAL PRN
Status: DISCONTINUED | OUTPATIENT
Start: 2019-04-16 | End: 2019-04-16 | Stop reason: HOSPADM

## 2019-04-16 RX ORDER — FENTANYL CITRATE 50 UG/ML
INJECTION, SOLUTION INTRAMUSCULAR; INTRAVENOUS PRN
Status: DISCONTINUED | OUTPATIENT
Start: 2019-04-16 | End: 2019-04-16 | Stop reason: SDUPTHER

## 2019-04-16 RX ORDER — PROPOFOL 10 MG/ML
INJECTION, EMULSION INTRAVENOUS PRN
Status: DISCONTINUED | OUTPATIENT
Start: 2019-04-16 | End: 2019-04-16 | Stop reason: SDUPTHER

## 2019-04-16 RX ORDER — LABETALOL HYDROCHLORIDE 5 MG/ML
5 INJECTION, SOLUTION INTRAVENOUS EVERY 10 MIN PRN
Status: DISCONTINUED | OUTPATIENT
Start: 2019-04-16 | End: 2019-04-16 | Stop reason: HOSPADM

## 2019-04-16 RX ADMIN — PROPOFOL 50 MG: 10 INJECTION, EMULSION INTRAVENOUS at 11:10

## 2019-04-16 RX ADMIN — PROPOFOL 50 MG: 10 INJECTION, EMULSION INTRAVENOUS at 10:59

## 2019-04-16 RX ADMIN — PROPOFOL 50 MG: 10 INJECTION, EMULSION INTRAVENOUS at 11:08

## 2019-04-16 RX ADMIN — SODIUM CHLORIDE, POTASSIUM CHLORIDE, SODIUM LACTATE AND CALCIUM CHLORIDE: 600; 310; 30; 20 INJECTION, SOLUTION INTRAVENOUS at 11:17

## 2019-04-16 RX ADMIN — PROPOFOL 50 MG: 10 INJECTION, EMULSION INTRAVENOUS at 11:16

## 2019-04-16 RX ADMIN — PROPOFOL 50 MG: 10 INJECTION, EMULSION INTRAVENOUS at 10:55

## 2019-04-16 RX ADMIN — PROPOFOL 50 MG: 10 INJECTION, EMULSION INTRAVENOUS at 11:13

## 2019-04-16 RX ADMIN — PROPOFOL 50 MG: 10 INJECTION, EMULSION INTRAVENOUS at 10:57

## 2019-04-16 RX ADMIN — FENTANYL CITRATE 25 MCG: 50 INJECTION, SOLUTION INTRAMUSCULAR; INTRAVENOUS at 11:02

## 2019-04-16 RX ADMIN — PROPOFOL 50 MG: 10 INJECTION, EMULSION INTRAVENOUS at 11:05

## 2019-04-16 RX ADMIN — FENTANYL CITRATE 25 MCG: 50 INJECTION, SOLUTION INTRAMUSCULAR; INTRAVENOUS at 11:09

## 2019-04-16 RX ADMIN — PROPOFOL 50 MG: 10 INJECTION, EMULSION INTRAVENOUS at 11:02

## 2019-04-16 RX ADMIN — PROPOFOL 50 MG: 10 INJECTION, EMULSION INTRAVENOUS at 10:56

## 2019-04-16 RX ADMIN — LIDOCAINE HYDROCHLORIDE 50 MG: 10 INJECTION, SOLUTION EPIDURAL; INFILTRATION; INTRACAUDAL; PERINEURAL at 10:55

## 2019-04-16 RX ADMIN — SODIUM CHLORIDE, POTASSIUM CHLORIDE, SODIUM LACTATE AND CALCIUM CHLORIDE: 600; 310; 30; 20 INJECTION, SOLUTION INTRAVENOUS at 09:37

## 2019-04-16 ASSESSMENT — PAIN SCALES - GENERAL
PAINLEVEL_OUTOF10: 0
PAINLEVEL_OUTOF10: 5

## 2019-04-16 ASSESSMENT — PULMONARY FUNCTION TESTS
PIF_VALUE: 1
PIF_VALUE: 0
PIF_VALUE: 1
PIF_VALUE: 0
PIF_VALUE: 0
PIF_VALUE: 1
PIF_VALUE: 0
PIF_VALUE: 0
PIF_VALUE: 1
PIF_VALUE: 0
PIF_VALUE: 1
PIF_VALUE: 0
PIF_VALUE: 1
PIF_VALUE: 0
PIF_VALUE: 1
PIF_VALUE: 1

## 2019-04-16 ASSESSMENT — PAIN DESCRIPTION - PAIN TYPE: TYPE: SURGICAL PAIN

## 2019-04-16 ASSESSMENT — PAIN DESCRIPTION - LOCATION: LOCATION: ABDOMEN

## 2019-04-16 ASSESSMENT — PAIN - FUNCTIONAL ASSESSMENT: PAIN_FUNCTIONAL_ASSESSMENT: 0-10

## 2019-04-16 ASSESSMENT — PAIN DESCRIPTION - FREQUENCY: FREQUENCY: CONTINUOUS

## 2019-04-16 ASSESSMENT — ENCOUNTER SYMPTOMS: SHORTNESS OF BREATH: 1

## 2019-04-16 ASSESSMENT — PAIN DESCRIPTION - DESCRIPTORS: DESCRIPTORS: CONSTANT;CRAMPING

## 2019-04-16 ASSESSMENT — PAIN DESCRIPTION - ONSET: ONSET: AWAKENED FROM SLEEP

## 2019-04-16 ASSESSMENT — PAIN DESCRIPTION - ORIENTATION: ORIENTATION: MID

## 2019-04-16 NOTE — OP NOTE
PROCEDURE NOTE    DATE OF PROCEDURE: 4/16/2019    SURGEON: Clair Schultz MD    ASSISTANT: None    PREOPERATIVE DIAGNOSIS: SCREENING  HX OF ABDOMINAL PAINS  HX OF ATTEMPTED COLON  AND BE AND INABILITY TO HAVE COLON EXAM SECONDARY TO EXTREME TORTIOUS SIGMOID  PROCEDURE WAS PLANNED WITH SURGICAL BACK UP    POSTOPERATIVE DIAGNOSIS: as described below    OPERATION: Total colonoscopy WITH BIOPSIES    PROLONG PROCEDURE     ANESTHESIA: Moderate Sedation    ESTIMATED BLOOD LOSS: less than 50     COMPLICATIONS: None. SPECIMENS:  Was Obtained:     HISTORY: The patient is a 61y.o. year old female with history of above preop diagnosis. I recommended colonoscopy with possible biopsy or polypectomy and I explained the risk, benefits, expected outcome, and alternatives to the procedure. Risks included but are not limited to bleeding, infection, respiratory distress, hypotension, and perforation of the colon and possibility of missing a lesion. The patient understands and is in agreement. PROCEDURE: The patient was given IV conscious sedation. The patient's SPO2 remained above 90% throughout the procedure. The colonoscope was inserted per rectum and advanced under direct vision to the cecum with difficulty. The prep was fair.       Findings:  COLON IS VERY TORTIOUS IN THE SIGMOID   MULTIPLE TORTIOUS LOOPS WERE NOTED IN THIS AREA  PATIENT WAS NOT ABLE TO MAINTAIN ANY AIR  PREP WAS ALSO FAIR AT BEST  PRESSURES WERE APPLIED IN THE BELLY AND WITH SOME EFFORT SIGMOID TORTUOSITY WAS PASSED   SOME NON SPECIFIC DILATION OF THE COLON WITH LARGE AMOUNT OF THICK PASTY DARK STOOLS WERE NOTED  DIVERTICULOSIS MOD TO SEVERE   AGGRESSIVE FLUSHING WERE DONE TO THE BEST OF THE ABILITY   NO GROSS LESIONS BLEEDING OR ANY MASS EFFECT WAS NOTED  SCOPE WAS THEN ADVANCED TO THE CECUM AND THEN INTO TERMINAL ILEUM  PROCEDURE NOTE      Terminal ileum: normal    Cecum/Ascending colon: normal WITH SOME RETAINED STOOLS RANDOM BIOPSIES WERE

## 2019-04-16 NOTE — H&P
HISTORY and [address]       NAME:  Shameka Lazcano  MRN: [id number]   YOB: 1955   Date: 4/16/2019   Age: 61 y.o. Gender: female       COMPLAINT AND PRESENT HISTORY:   Joe Rojas is a 61 yr old female who is having a colonoscopy today. She had been having constipation and no blood in stool, She was told to take ProBiotics and the sx went away, No more constipation. She has some LLQ abd pain intermittently, She has no blood in stool. She did have AMI and stent placed in 2009 and did well. She sees Dr Souleymane Mehta,     She has DM and glucose was 104 this AM    PAST MEDICAL HISTORY     Past Medical History:   Diagnosis Date    Anemia     Arthritis     back    Guerrero esophagus 11/01/2018    Bruises easily     CAD S/P percutaneous coronary angioplasty 6/8/2017    cardiac stent x1 5-29-09    Chronic back pain, KNEE PAIN AND NECK PAIN.  CKD (chronic kidney disease) stage 3, GFR 30-59 ml/min ([facility])     DDD (degenerative disc disease)     DDD (degenerative disc disease)     Diverticulitis     Fibromyalgia     Full dentures     H/O dilation and curettage 07/17/2017    History of myocardial infarction 5/09    Hx of blood clots     left kidney    Hypercholesterolemia     Hypertension     Neuropathy     Obesity     Post-menopausal bleeding     Short of breath on exertion     Spinal stenosis     Tobacco abuse     'quit 3 yrs ago. \"    Type 2 diabetes mellitus without complication, without long-term current use of insulin ([address] 6/8/2017    Wears glasses          SURGICAL HISTORY       Past Surgical History:   Procedure Laterality Date    BREAST BIOPSY  2005    Left---Benign    CARDIAC CATHETERIZATION  06/20/2016    no intervention    COLONOSCOPY  2011    diverticulitis    CORONARY ANGIOPLASTY WITH STENT PLACEMENT  5/09    DILATION AND CURETTAGE OF UTERUS N/A 7/17/2017    DILATATION AND CURETTAGE OPERATIVE HYSTEROSCOPY WITH MYOSURE, POLYPECTOMY, Relationships    Social connections:     Talks on phone: Not on file     Gets together: Not on file     Attends Voodoo service: Not on file     Active member of club or organization: Not on file     Attends meetings of clubs or organizations: Not on file     Relationship status: Not on file    Intimate partner violence:     Fear of current or ex partner: Not on file     Emotionally abused: Not on file     Physically abused: Not on file     Forced sexual activity: Not on file   Other Topics Concern    Not on file   Social History Narrative    Not on file           REVIEW OF SYSTEMS      Allergies   Allergen Reactions    Pcn [Penicillins] Hives    Amlodipine      Leg swelling    Codeine Other (See Comments)     Stomach cramps    Gabapentin Other (See Comments)     Causes hands shake and unable to hold anything    Lyrica [Pregabalin] Other (See Comments)     \"trouble concentrating\"    Sulfa Antibiotics Other (See Comments)     Cramps       No current facility-administered medications on file prior to encounter. Current Outpatient Medications on File Prior to Encounter   Medication Sig Dispense Refill    Cyanocobalamin (VITAMIN B 12 PO) Take 500 mg by mouth      ferrous sulfate 325 (65 Fe) MG EC tablet Take 325 mg by mouth 3 times daily (with meals)      Naproxen Sodium 220 MG CAPS Take 1 capsule by mouth 2 times daily      estrogen, conjugated,-medroxyprogesterone (PREMPRO) 0.625-2.5 MG per tablet Take 1 tablet by mouth      isosorbide mononitrate (IMDUR) 60 MG extended release tablet Take 60 mg by mouth daily      Calcium Carbonate-Vitamin D (OYSTER SHELL/VITAMIN D PO) Take 1 capsule by mouth 3 times daily      oxybutynin (DITROPAN) 5 MG tablet Take 5 mg by mouth 2 times daily      clotrimazole-betamethasone (LOTRISONE) 1-0.05 % cream Apply topically 2 times daily. 1 Tube 2    HYDROcodone-acetaminophen (NORCO) 5-325 MG per tablet Take 1 tablet by mouth every 12 hours as needed for Pain.  tiZANidine (ZANAFLEX) 4 MG tablet Take 4 mg by mouth every 8 hours as needed      TRUE METRIX BLOOD GLUCOSE TEST strip       TRUEPLUS LANCETS 30G MISC       DULoxetine (CYMBALTA) 30 MG extended release capsule Take 30 mg by mouth 2 times daily       metFORMIN (GLUCOPHAGE) 500 MG tablet Take 500 mg by mouth 2 times daily (with meals)       metoprolol (LOPRESSOR) 50 MG tablet Take 50 mg by mouth 2 times daily.  atorvastatin (LIPITOR) 80 MG tablet Take 80 mg by mouth daily.  aspirin 81 MG EC tablet Take 81 mg by mouth daily.  nitroGLYCERIN (NITROSTAT) 0.4 MG SL tablet Place 0.4 mg under the tongue every 5 minutes as needed for Chest pain up to max of 3 total doses. If no relief after 1 dose, call 911. Negative except for what is mentioned in the HPI. GENERAL PHYSICAL EXAM     Vitals: BP (!) 182/95   Pulse 73   Temp 97.7 °F (36.5 °C)   Resp 16   Ht 5' 9\" (1.753 m)   Wt 247 lb (112 kg)   LMP 06/29/2002 (Within Months)   SpO2 97%   BMI 36.48 kg/m²  Body mass index is 36.48 kg/m². GENERAL APPEARANCE:   Ubaldo Ortega is 61 y.o.,  female, mildly obese,  Denies abd pain today                            SKIN:  No skin lesions                HEAD:  Normocephalic, Face symmetrical.                  EYES:  DESTIN EOMI and gaze conjugatge . EARS:  No hearing loss. NOSE:  Septum midline, No epistaxis. THROAT:  No erythema of pharynx Has upper and lower dentures                  NECK:  Has good ROM of neck,  Has no  adenopathy. CHEST:  Symmetrical and equal on expansion. HEART:  HT regular                    LUNGS:  Equal on expansion, normal breath sounds. No adventitious sounds. ABDOMEN:   Abdomen is soft on palpation. No localized tenderness. No guarding or rigidity. No palpable organomegaly. LYMPHATICS:  No palpable cervical lymphadenopathy.      LOCOMOTOR, BACK AND SPINE:  Has no back pain and no CVA pain                 EXTREMITIES:  Good range of motion of upper and lower extremities, Steady gait, Peripheral pulses strong     NEUROLOGIC:  Speech clear and steady gait, No weakness                       PROVISIONAL DIAGNOSES / SURGERY:      For colonoscopy   Patient Active Problem List    Diagnosis Date Noted    History of total left knee replacement 09/19/2017     Priority: High    PMB (postmenopausal bleeding) 05/16/2013     Priority: High    History of myocardial infarction      Priority: High    Hypertension      Priority: High    Tobacco abuse      Priority: High    History of D&C Hysteroscopy 1/10/2018 01/30/2018     Priority: Medium    Status post D&C Hysteroscopy 1/10/2018 01/10/2018     Priority: Medium    S/P tubal ligation 05/16/2013     Priority: Medium    Hypercholesterolemia      Priority: Medium    Diverticulitis      Priority: Medium    Obesity      Priority: Medium    Osteopenia 05/09/2018     Priority: Low    Arthritis      Priority: Low    Bruises easily      Priority: Low    IBS (irritable bowel syndrome) 02/19/2019    Guerrero esophagus 11/01/2018    Anemia 10/04/2018    Constipation 10/04/2018    Uterine hyperplasia Simple and complex WITHOUT ATYPIA (7/17/2017) 09/19/2017    Fibroid uterus 07/17/2017  7/17/17 Hysto, D/C, Myosure  07/17/2017    H/O dilation and curettage 07/17/2017    CAD S/P percutaneous coronary angioplasty 06/08/2017    Type 2 diabetes mellitus without complication, without long-term current use of insulin ([address] 06/08/2017    CKD (chronic kidney disease) stage 3, GFR 30-59 ml/min ([address] 06/08/2017    Diabetic polyneuropathy associated with type 2 diabetes mellitus ([address] 06/08/2017    Morbid obesity due to excess calories ([address] 06/08/2017    SOB (shortness of breath) 06/20/2016    DDD (degenerative disc disease)     Neuropathy            LG Amos - CNP on 4/16/2019 at 10:00 AM

## 2019-04-16 NOTE — ANESTHESIA PRE PROCEDURE
Department of Anesthesiology  Preprocedure Note       Name:  Susan Lopes   Age:  61 y.o.  :  1955                                          MRN:  [id number]         Date:  2019      Surgeon: Dallas Mishra):  Marimar Sanchez MD    Procedure: COLONOSCOPY DIAGNOSTIC (N/A )    Medications prior to admission:   Prior to Admission medications    Medication Sig Start Date End Date Taking? Authorizing Provider   omeprazole (PRILOSEC) 40 MG delayed release capsule Take 1 capsule by mouth daily 19  Yes Marimar Sanchez MD   Cyanocobalamin (VITAMIN B 12 PO) Take 500 mg by mouth   Yes Historical Provider, MD   ferrous sulfate 325 (65 Fe) MG EC tablet Take 325 mg by mouth 3 times daily (with meals)   Yes Historical Provider, MD   Naproxen Sodium 220 MG CAPS Take 1 capsule by mouth 2 times daily   Yes Historical Provider, MD   estrogen, conjugated,-medroxyprogesterone (PREMPRO) 0.625-2.5 MG per tablet Take 1 tablet by mouth   Yes Historical Provider, MD   isosorbide mononitrate (IMDUR) 60 MG extended release tablet Take 60 mg by mouth daily 18  Yes Historical Provider, MD   Calcium Carbonate-Vitamin D (OYSTER SHELL/VITAMIN D PO) Take 1 capsule by mouth 3 times daily   Yes Historical Provider, MD   oxybutynin (DITROPAN) 5 MG tablet Take 5 mg by mouth 2 times daily   Yes Historical Provider, MD   clotrimazole-betamethasone (LOTRISONE) 1-0.05 % cream Apply topically 2 times daily. 18  Yes Moises LG Luis - CNP   HYDROcodone-acetaminophen (NORCO) 5-325 MG per tablet Take 1 tablet by mouth every 12 hours as needed for Pain.    Yes Historical Provider, MD   tiZANidine (ZANAFLEX) 4 MG tablet Take 4 mg by mouth every 8 hours as needed   Yes Historical Provider, MD   TRUE METRIX BLOOD GLUCOSE TEST strip  17  Yes Historical Provider, MD   TRUEPLUS LANCETS 30G MISC  4/10/17  Yes Historical Provider, MD   DULoxetine (CYMBALTA) 30 MG extended release capsule Take 30 mg by mouth 2 times daily    Yes Historical Provider, MD   metFORMIN (GLUCOPHAGE) 500 MG tablet Take 500 mg by mouth 2 times daily (with meals)    Yes Historical Provider, MD   metoprolol (LOPRESSOR) 50 MG tablet Take 50 mg by mouth 2 times daily. Yes Historical Provider, MD   atorvastatin (LIPITOR) 80 MG tablet Take 80 mg by mouth daily. Yes Historical Provider, MD   aspirin 81 MG EC tablet Take 81 mg by mouth daily. Yes Historical Provider, MD   nitroGLYCERIN (NITROSTAT) 0.4 MG SL tablet Place 0.4 mg under the tongue every 5 minutes as needed for Chest pain up to max of 3 total doses. If no relief after 1 dose, call 911. Historical Provider, MD       Current medications:    Current Facility-Administered Medications   Medication Dose Route Frequency Provider Last Rate Last Dose    lactated ringers infusion   Intravenous Continuous [place] MD Michael 100 mL/hr at 04/16/19 0937         Allergies:     Allergies   Allergen Reactions    Pcn [Penicillins] Hives    Amlodipine      Leg swelling    Codeine Other (See Comments)     Stomach cramps    Gabapentin Other (See Comments)     Causes hands shake and unable to hold anything    Lyrica [Pregabalin] Other (See Comments)     \"trouble concentrating\"    Sulfa Antibiotics Other (See Comments)     Cramps       Problem List:    Patient Active Problem List   Diagnosis Code    History of myocardial infarction I25.2    Hypercholesterolemia E78.00    Hypertension I10    Diverticulitis K57.92    Tobacco abuse Z72.0    Arthritis M19.90    Bruises easily R23.8    Obesity E66.9    PMB (postmenopausal bleeding) N95.0    S/P tubal ligation Z98.51    DDD (degenerative disc disease) [id number]    Neuropathy G62.9    SOB (shortness of breath) R06.02    CAD S/P percutaneous coronary angioplasty I25.10, Z98.61    Type 2 diabetes mellitus without complication, without long-term current use of insulin (HCC) E11.9    CKD (chronic kidney disease) stage 3, GFR 30-59 ml/min ([facility]) N18.3    Diabetic polyneuropathy associated with type 2 diabetes mellitus ([address] E11.42    Morbid obesity due to excess calories ([address] E66.01    Fibroid uterus D25.9    7/17/17 Hysto, D/C, Myosure  Z98.890    H/O dilation and curettage Z98.890    Uterine hyperplasia Simple and complex WITHOUT ATYPIA (7/17/2017) N85.2    History of total left knee replacement Z96.652    Status post D&C Hysteroscopy 1/10/2018 Z98.890    History of D&C Hysteroscopy 1/10/2018 Z98.890    Osteopenia M85.80    Anemia D64.9    Constipation K59.00    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    IBS (irritable bowel syndrome) K58.9       Past Medical History:        Diagnosis Date    Anemia     Arthritis     back    Guerrero esophagus 11/01/2018    Bruises easily     CAD S/P percutaneous coronary angioplasty 6/8/2017    cardiac stent x1 5-29-09    Chronic back pain, KNEE PAIN AND NECK PAIN.  CKD (chronic kidney disease) stage 3, GFR 30-59 ml/min ([facility])     DDD (degenerative disc disease)     DDD (degenerative disc disease)     Diverticulitis     Fibromyalgia     Full dentures     H/O dilation and curettage 07/17/2017    History of myocardial infarction 5/09    Hx of blood clots     left kidney    Hypercholesterolemia     Hypertension     Neuropathy     Obesity     Post-menopausal bleeding     Short of breath on exertion     Spinal stenosis     Tobacco abuse     'quit 3 yrs ago. \"    Type 2 diabetes mellitus without complication, without long-term current use of insulin ([address] 6/8/2017    Wears glasses        Past Surgical History:        Procedure Laterality Date    BREAST BIOPSY  2005    Left---Benign    CARDIAC CATHETERIZATION  06/20/2016    no intervention    COLONOSCOPY  2011    diverticulitis    CORONARY ANGIOPLASTY WITH STENT PLACEMENT  5/09    DILATION AND CURETTAGE OF UTERUS N/A 7/17/2017    DILATATION AND CURETTAGE OPERATIVE HYSTEROSCOPY WITH MYOSURE, POLYPECTOMY, MYOMECTOMY performed by Jamar Ferreira DO at [address] [place] [address] HYSTEROSCOPY  13    Operative Hscope with endometrial sampling, polypectomy & myomectomy [facility]    HYSTEROSCOPY  2017    AR OFFICE/OUTPT VISIT,PROCEDURE ONLY N/A 1/10/2018    DILATATION AND CURETTAGE HYSTEROSCOPY performed by Verena Boyer DO at [facility]  age 22   [address] Left 2017    TUBAL LIGATION  1979    UPPER GASTROINTESTINAL ENDOSCOPY  2018    [facility]       Social History:    Social History     Tobacco Use    Smoking status: Former Smoker     Packs/day: 0.50     Years: 40.00     Pack years: 20.00     Last attempt to quit: 2012     Years since quittin.2    Smokeless tobacco: Never Used   Substance Use Topics    Alcohol use: Yes     Comment: Occassional                                Counseling given: Not Answered      Vital Signs (Current):   Vitals:    19 0915   BP: (!) 182/95   Pulse: 73   Resp: 16   Temp: 97.7 °F (36.5 °C)   SpO2: 97%   Weight: 247 lb (112 kg)   Height: 5' 9\" (1.753 m)                                              BP Readings from Last 3 Encounters:   19 (!) 182/95   19 (!) 185/90   10/04/18 (!) 162/88       NPO Status: Time of last liquid consumption:                         Time of last solid consumption:                         Date of last liquid consumption: 04/15/19                        Date of last solid food consumption: 19    BMI:   Wt Readings from Last 3 Encounters:   19 247 lb (112 kg)   19 247 lb 14.4 oz (112.4 kg)   10/04/18 243 lb 1.6 oz (110.3 kg)     Body mass index is 36.48 kg/m².     CBC:   Lab Results   Component Value Date    WBC 6.1 2018    RBC 4.90 2018    RBC 4.50 2012    HGB 14.0 2018    HCT 42.4 2018    MCV 86.6 2018    RDW 19.7 2018     2018     2012       CMP:   Lab Results   Component Value Date     2018    K 3.9 2018     2018    CO2 25

## 2019-04-16 NOTE — ANESTHESIA POSTPROCEDURE EVALUATION
Department of Anesthesiology  Postprocedure Note    Patient: Ofelia Anton  MRN: [id number]  YOB: 1955  Date of evaluation: 4/16/2019  Time:  12:30 PM     Procedure Summary     Date:  04/16/19 Room / Location:  [address] [place] [address] Ramiro Smith 08 / [address]    Anesthesia Start:  1040 Anesthesia Stop:  1127    Procedure:  COLONOSCOPY WITH BIOPSY (N/A ) Diagnosis:  (TORTIOUS SIGMOID COLON                   PAT ON ADMIT PER ANEST)    Surgeon:  Verena Burgess MD Responsible Provider:  Fco Ramírez MD    Anesthesia Type:  MAC ASA Status:  3          Anesthesia Type: MAC    Tatiana Phase I: Tatiana Score: 8    Tatiana Phase II:      Last vitals: Reviewed and per EMR flowsheets.        Anesthesia Post Evaluation    Comments: POST- ANESTHESIA EVALUATION       Pt Name: Ofelia Anton  MRN: [id number]  [place]: 1955  Date of evaluation: 4/16/2019  Time:  12:30 PM      BP (!) 155/77   Pulse 81   Temp 97.2 °F (36.2 °C)   Resp 12   Ht 5' 9\" (1.753 m)   Wt 247 lb (112 kg)   LMP 06/29/2002 (Within Months)   SpO2 98%   BMI 36.48 kg/m²      Consciousness Level  Awake  Cardiopulmonary Status  Stable  Pain Adequately Treated YES  Nausea / Vomiting  NO  Adequate Hydration  YES  Anesthesia Related Complications NONE      Electronically signed by Fco Ramírez MD on 4/16/2019 at 12:30 PM

## 2019-04-17 LAB — SURGICAL PATHOLOGY REPORT: NORMAL

## 2019-04-29 ENCOUNTER — TELEPHONE (OUTPATIENT)
Dept: GASTROENTEROLOGY | Age: 64
End: 2019-04-29

## 2019-04-29 RX ORDER — PANTOPRAZOLE SODIUM 40 MG/1
40 TABLET, DELAYED RELEASE ORAL DAILY
COMMUNITY

## 2019-04-29 NOTE — TELEPHONE ENCOUNTER
Pt's pharmacy sent fax that pt is getting rx of Omeprazol from [facility] and has current rx for Pantoparzole as well from PCP, Dr Theo Joel. Did speak to pt. She states she is out of the Omeprazole and is currently using the Patoprazole from Dr Theo Joel. Pt states both rxs worked well for her and that sometimes she takes Tums as needed as well. Pt chart updated and fax sent to pts insurance.

## 2019-05-03 ENCOUNTER — OFFICE VISIT (OUTPATIENT)
Dept: OBGYN CLINIC | Age: 64
End: 2019-05-03
Payer: MEDICARE

## 2019-05-03 ENCOUNTER — HOSPITAL ENCOUNTER (OUTPATIENT)
Age: 64
Setting detail: SPECIMEN
Discharge: HOME | End: 2019-05-03
Payer: MEDICARE

## 2019-05-03 VITALS
DIASTOLIC BLOOD PRESSURE: 76 MMHG | HEIGHT: 69 IN | BODY MASS INDEX: 37.18 KG/M2 | HEART RATE: 97 BPM | SYSTOLIC BLOOD PRESSURE: 122 MMHG | WEIGHT: 251 LBS

## 2019-05-03 DIAGNOSIS — Z01.419 CERVICAL SMEAR, AS PART OF ROUTINE GYNECOLOGICAL EXAMINATION: ICD-10-CM

## 2019-05-03 DIAGNOSIS — Z12.39 BREAST CANCER SCREENING: ICD-10-CM

## 2019-05-03 DIAGNOSIS — Z11.51 SCREENING FOR HUMAN PAPILLOMAVIRUS: ICD-10-CM

## 2019-05-03 DIAGNOSIS — Z01.419 CERVICAL SMEAR, AS PART OF ROUTINE GYNECOLOGICAL EXAMINATION: Primary | ICD-10-CM

## 2019-05-03 DIAGNOSIS — R39.9 UTI SYMPTOMS: ICD-10-CM

## 2019-05-03 DIAGNOSIS — N95.0 PMB (POSTMENOPAUSAL BLEEDING): ICD-10-CM

## 2019-05-03 LAB
-: ABNORMAL
AMORPHOUS: ABNORMAL
BACTERIA: ABNORMAL
BILIRUBIN URINE: NEGATIVE
CASTS UA: ABNORMAL /LPF
COLOR: ABNORMAL
COMMENT UA: ABNORMAL
CRYSTALS, UA: ABNORMAL /HPF
EPITHELIAL CELLS UA: ABNORMAL /HPF
GLUCOSE URINE: NEGATIVE
KETONES, URINE: ABNORMAL
LEUKOCYTE ESTERASE, URINE: ABNORMAL
MUCUS: ABNORMAL
NITRITE, URINE: POSITIVE
OTHER OBSERVATIONS UA: ABNORMAL
PH UA: 6 (ref 5–8)
PROTEIN UA: ABNORMAL
RBC UA: ABNORMAL /HPF
RENAL EPITHELIAL, UA: ABNORMAL /HPF
SPECIFIC GRAVITY UA: 1.02 (ref 1–1.03)
TRICHOMONAS: ABNORMAL
TURBIDITY: ABNORMAL
URINE HGB: NEGATIVE
UROBILINOGEN, URINE: NORMAL
WBC UA: ABNORMAL /HPF
YEAST: ABNORMAL

## 2019-05-03 PROCEDURE — G0101 CA SCREEN;PELVIC/BREAST EXAM: HCPCS | Performed by: NURSE PRACTITIONER

## 2019-05-03 PROCEDURE — 87086 URINE CULTURE/COLONY COUNT: CPT

## 2019-05-03 PROCEDURE — G0145 SCR C/V CYTO,THINLAYER,RESCR: HCPCS

## 2019-05-03 PROCEDURE — 87077 CULTURE AEROBIC IDENTIFY: CPT

## 2019-05-03 PROCEDURE — 87186 SC STD MICRODIL/AGAR DIL: CPT

## 2019-05-03 PROCEDURE — 87624 HPV HI-RISK TYP POOLED RSLT: CPT

## 2019-05-03 PROCEDURE — 81001 URINALYSIS AUTO W/SCOPE: CPT

## 2019-05-03 RX ORDER — CLOTRIMAZOLE AND BETAMETHASONE DIPROPIONATE 10; .64 MG/G; MG/G
CREAM TOPICAL
Qty: 1 TUBE | Refills: 3 | Status: SHIPPED | OUTPATIENT
Start: 2019-05-03 | End: 2019-05-28

## 2019-05-03 RX ORDER — FLUCONAZOLE 150 MG/1
150 TABLET ORAL
Qty: 2 TABLET | Refills: 0 | Status: SHIPPED | OUTPATIENT
Start: 2019-05-03 | End: 2019-05-07

## 2019-05-03 ASSESSMENT — PATIENT HEALTH QUESTIONNAIRE - PHQ9
SUM OF ALL RESPONSES TO PHQ9 QUESTIONS 1 & 2: 0
SUM OF ALL RESPONSES TO PHQ QUESTIONS 1-9: 0
1. LITTLE INTEREST OR PLEASURE IN DOING THINGS: 0
SUM OF ALL RESPONSES TO PHQ QUESTIONS 1-9: 0
2. FEELING DOWN, DEPRESSED OR HOPELESS: 0

## 2019-05-03 NOTE — PROGRESS NOTES
curettage 2017    History of myocardial infarction     Hx of blood clots     left kidney    Hypercholesterolemia     Hypertension     Neuropathy     Obesity     Post-menopausal bleeding     Short of breath on exertion     Spinal stenosis     Tobacco abuse     'quit 3 yrs ago. \"    Type 2 diabetes mellitus without complication, without long-term current use of insulin ([facility] [address] 2017    Wears glasses                                                                    Past Surgical History:   Procedure Laterality Date    BREAST BIOPSY  2005    Left---Benign    CARDIAC CATHETERIZATION  2016    no intervention    COLONOSCOPY      diverticulitis    COLONOSCOPY N/A 2019    COLONOSCOPY WITH BIOPSY performed by Sebastian Alfred MD at [address]      DILATION AND CURETTAGE OF UTERUS N/A 2017    DILATATION AND CURETTAGE OPERATIVE HYSTEROSCOPY WITH [id number], POLYPECTOMY, MYOMECTOMY performed by Adelaida Mata DO at [address] [facility] HYSTEROSCOPY  13    Operative Hscope with endometrial sampling, polypectomy & myomectomy [facility]    HYSTEROSCOPY  2017    [place] OFFICE/OUTPT VISIT,PROCEDURE ONLY N/A 1/10/2018    DILATATION AND CURETTAGE HYSTEROSCOPY performed by Adelaida Mata DO at [address] [facility]  age 22   [address] Left 2017    TOTAL KNEE ARTHROPLASTY Right 2018    TUBAL LIGATION  1979    UPPER GASTROINTESTINAL ENDOSCOPY  2018    REESE'S     Family History   Problem Relation Age of Onset    Heart Attack Father     Diabetes Mother     Other Mother         brain bleed    Other Daughter         blood clotting disorder    Breast Cancer Neg Hx     Cancer Neg Hx     Colon Cancer Neg Hx     Eclampsia Neg Hx     Hypertension Neg Hx     Ovarian Cancer Neg Hx      Labor Neg Hx     Spont Abortions Neg Hx     Stroke Neg Hx     Heart Disease Neg Hx      Social History     Socioeconomic History    Marital status:      Spouse name: Not on file    Number of children: Not on file    Years of education: Not on file    Highest education level: Not on file   Occupational History    Not on file   Social Needs    Financial resource strain: Not on file    Food insecurity:     Worry: Not on file     Inability: Not on file    Transportation needs:     Medical: Not on file     Non-medical: Not on file   Tobacco Use    Smoking status: Former Smoker     Packs/day: 0.50     Years: 40.00     Pack years: 20.00     Last attempt to quit: 2012     Years since quittin.2    Smokeless tobacco: Never Used   Substance and Sexual Activity    Alcohol use: Yes     Comment: Occassional    Drug use: No    Sexual activity: Never     Birth control/protection: Post-menopausal   Lifestyle    Physical activity:     Days per week: Not on file     Minutes per session: Not on file    Stress: Not on file   Relationships    Social connections:     Talks on phone: Not on file     Gets together: Not on file     Attends Islam service: Not on file     Active member of club or organization: Not on file     Attends meetings of clubs or organizations: Not on file     Relationship status: Not on file    Intimate partner violence:     Fear of current or ex partner: Not on file     Emotionally abused: Not on file     Physically abused: Not on file     Forced sexual activity: Not on file   Other Topics Concern    Not on file   Social History Narrative    Not on file       MEDICATIONS:  Current Outpatient Medications   Medication Sig Dispense Refill    clotrimazole-betamethasone (LOTRISONE) 1-0.05 % cream Apply to affected area bid externally x 4 days then daily for 3 days 1 Tube 3    fluconazole (DIFLUCAN) 150 MG tablet Take 1 tablet by mouth every 72 hours for 4 days 2 tablet 0    pantoprazole (PROTONIX) 40 MG tablet Take 40 mg by mouth daily      Cyanocobalamin (VITAMIN B 12 PO) Take 500 mg by mouth      ferrous sulfate 325 (65 Fe) MG EC tablet Take 325 mg by mouth 3 times daily (with meals)      Naproxen Sodium 220 MG CAPS Take 1 capsule by mouth 2 times daily      estrogen, conjugated,-medroxyprogesterone (PREMPRO) 0.625-2.5 MG per tablet Take 1 tablet by mouth      isosorbide mononitrate (IMDUR) 60 MG extended release tablet Take 60 mg by mouth daily      Calcium Carbonate-Vitamin D (OYSTER SHELL/VITAMIN D PO) Take 1 capsule by mouth 3 times daily      oxybutynin (DITROPAN) 5 MG tablet Take 5 mg by mouth 2 times daily      clotrimazole-betamethasone (LOTRISONE) 1-0.05 % cream Apply topically 2 times daily. 1 Tube 2    HYDROcodone-acetaminophen (NORCO) 5-325 MG per tablet Take 1 tablet by mouth every 12 hours as needed for Pain.  tiZANidine (ZANAFLEX) 4 MG tablet Take 4 mg by mouth every 8 hours as needed      TRUE METRIX BLOOD GLUCOSE TEST strip       TRUEPLUS LANCETS 30G MISC       DULoxetine (CYMBALTA) 30 MG extended release capsule Take 30 mg by mouth 2 times daily       nitroGLYCERIN (NITROSTAT) 0.4 MG SL tablet Place 0.4 mg under the tongue every 5 minutes as needed for Chest pain up to max of 3 total doses. If no relief after 1 dose, call 911.  metFORMIN (GLUCOPHAGE) 500 MG tablet Take 500 mg by mouth 2 times daily (with meals)       metoprolol (LOPRESSOR) 50 MG tablet Take 50 mg by mouth 2 times daily.  atorvastatin (LIPITOR) 80 MG tablet Take 80 mg by mouth daily.  aspirin 81 MG EC tablet Take 81 mg by mouth daily. No current facility-administered medications for this visit.             ALLERGIES:  Allergies as of 05/03/2019 - Review Complete 05/03/2019   Allergen Reaction Noted    Pcn [penicillins] Hives 12/14/2011    Amlodipine  03/13/2017    Codeine Other (See Comments) 12/14/2011    Gabapentin Other (See Comments) 02/21/2017    Lyrica [pregabalin] Other (See Comments) 01/03/2018    Sulfa antibiotics Other (See Comments) 06/08/2017       Symptoms of decreased mood absent  Symptoms of anhedonia absent    **If either question is answered in a  positive fashion then complete the PHQ9 Scoring Evaluation and make the appropriate referral**      Immunization status: stated as current, but no records available. Gynecologic History:  Menarche: 15 yo  Menopause at 54 yo     Patient's last menstrual period was 06/29/2002 (within months). Sexually Active: No    STD History: No     Permanent Sterilization: Yes TL in 1979   Reversible Birth Control: No        Hormone Replacement Exposure: Yes through DR Garcia Avery Qualified Family History of Breast, Ovarian , Colon or Uterine Cancer: No     If YES see scanned worksheet. Preventative Health Testing:    Health Maintenance:  Health Maintenance Due   Topic Date Due    Hepatitis C screen  1955    Pneumococcal 0-64 years Vaccine (1 of 1 - PPSV23) 12/07/1961    Diabetic foot exam  12/07/1965    Diabetic retinal exam  12/07/1965    HIV screen  12/07/1970    DTaP/Tdap/Td vaccine (1 - Tdap) 12/07/1974    Shingles Vaccine (1 of 2) 12/07/2005    Cervical cancer screen  05/01/2019       Date of Last Pap Smear: 5/1/2018 neg/neg  Abnormal Pap Smear History: yes- in her 25s- repeat pap smear was normal  Colposcopy History:   Date of Last Mammogram: 5/9/2018 benign- fibroadenoma  Date of Last Colonoscopy: 4/16/2019 - followed by Dr Pavan Newsome  Date of Last Bone Density:5/9/2018 osteopenia      ________________________________________________________________________        REVIEW OF SYSTEMS:    yes   A minimum of an eleven point review of systems was completed. Review Of Systems (11 point):  Constitutional: No fever, chills or malaise;  No weight change or fatigue  Head and Eyes: No vision, Headache, Dizziness or trauma in last 12 months  ENT ROS: No hearing, Tinnitis, sinus or taste problems  Hematological and Lymphatic ROS:No Lymphoma, Von Willebrand's, Hemophillia or Bleeding History  Psych ROS: No Depression, Homicidal thoughts,suicidal thoughts, or anxiety  Breast ROS: No prior breast abnormalities or lumps  Respiratory ROS: No SOB, Pneumoniae,Cough, or Pulmonary Embolism History  Cardiovascular ROS: No Chest Pain with Exertion, Palpitations, Syncope, Edema, Arrhythmia. + hyperlipidemia, hypertension  Gastrointestinal ROS: No Indigestion, Heartburn, Nausea, vomiting, Diarrhea, Constipation,or Bowel Changes; No Bloody Stools or melena  Genito-Urinary ROS: No Dysuria, Hematuria or Nocturia. No Urinary Incontinence or Vaginal Discharge. +PMB  Musculoskeletal ROS: No Arthralgia, Arthritis,Gout,Osteoporosis or Rheumatism  Neurological ROS: No CVA, Migraines, Epilepsy, Seizure Hx, or Limb Weakness. + type II diabetes  Dermatological ROS: No Rash, Itching, Hives, Mole Changes or Cancer                                                                                                                                                                                                                                  PHYSICAL Exam:     Constitutional:  Vitals:    05/03/19 0839   BP: 122/76   Site: Right Upper Arm   Position: Sitting   Cuff Size: Large Adult   Pulse: 97   Weight: 251 lb (113.9 kg)   Height: 5' 9\" (1.753 m)         General Appearance: This  is a well Developed, well Nourished, well groomed female. Her BMI was reviewed. Nutritional decision making was discussed. Skin:  There was a Normal Inspection of the skin without rashes or lesions. There were no rashes. (Papular, Maculopapular, Hives, Pustular, Macular)     There were no lesions (Ulcers, Erythema, Abn. Appearing Nevi)            Lymphatic:  No Lymph Nodes were Palpable in the neck , axilla or groin.  0 # Of Lymph Nodes; Location ; Character [Normal]  [Shotty] [Tender] [Enlarged]     Neck and EENT:  The neck was supple. There were no masses   The thyroid was not enlarged and had no masses.   Perrla, EOMI B/L, TMI B/L No Abnormalities. Throat inspected-No exudates or Masses, Nares Patent No Masses        Respiratory: The lungs were auscultated and found to be clear. There were no rales, rhonchi or wheezes. There was a good respiratory effort. Cardiovascular: The heart was in a regular rate and rhythm. . No S3 or S4. There was no murmur appreciated. Location, grade, and radiation are not applicable. Extremities: The patients extremities were without calf tenderness, edema, or varicosities. There was full range of motion in all four extremities. Pulses in all four extremities were appreciated and are 2/4. Abdomen: The abdomen was soft and non-tender. There were good bowel sounds in all quadrants and there was no guarding, rebound or rigidity. On evaluation there was no evidence of hepatosplenomegaly and there was no costal vertebral fausto tenderness bilaterally. No hernias were appreciated. Abdominal Scars: TL scars    Psych: The patient had a normal Orientation to: Time, Place, Person, and Situation  There is no Mood / Affect changes    Breast:  (Chest)  normal appearance, no masses or tenderness  Self breast exams were reviewed in detail. Literature was given. Pelvic Exam:  Vulva and vagina appear atrophic. Slight redness noted to perineum. Bimanual exam reveals normal uterus and adnexa. Rectal Exam:  exam declined by patient          Musculosk:  Normal Gait and station was noted. Digits were evaluated without abnormal findings. Range of motion, stability and strength were evaluated and found to be appropriate for the patients age. ASSESSMENT:      61 y.o. Annual   Diagnosis Orders   1. Cervical smear, as part of routine gynecological examination  PAP SMEAR   2. Screening for human papillomavirus  PAP SMEAR   3. Breast cancer screening  [facility] DIGITAL SCREEN W CAD BILATERAL   4. PMB (postmenopausal bleeding)  US Non OB Transvaginal    US Pelvis Complete   5.  UTI symptoms  Urinalysis Reflex to Culture          Chief Complaint   Patient presents with    Annual Exam          Past Medical History:   Diagnosis Date    Anemia     Arthritis     back    Guerrero esophagus 11/01/2018    Bruises easily     CAD S/P percutaneous coronary angioplasty 6/8/2017    cardiac stent x1 5-29-09    Chronic back pain, KNEE PAIN AND NECK PAIN.  CKD (chronic kidney disease) stage 3, GFR 30-59 ml/min ([facility])     DDD (degenerative disc disease)     DDD (degenerative disc disease)     Diverticulitis     Fibromyalgia     Full dentures     H/O dilation and curettage 07/17/2017    History of myocardial infarction 5/09    Hx of blood clots     left kidney    Hypercholesterolemia     Hypertension     Neuropathy     Obesity     Post-menopausal bleeding     Short of breath on exertion     Spinal stenosis     Tobacco abuse     'quit 3 yrs ago. \"    Type 2 diabetes mellitus without complication, without long-term current use of insulin ([address] 6/8/2017    Wears glasses          Patient Active Problem List    Diagnosis Date Noted    History of total left knee replacement 09/19/2017     Priority: High    PMB (postmenopausal bleeding) 05/16/2013     Priority: High    History of myocardial infarction      Priority: High     STENTS Prophylaxis if SURGERY      Hypertension      Priority: High    Tobacco abuse      Priority: High    History of D&C Hysteroscopy 1/10/2018 01/30/2018     Priority: Medium     Pathology without Hyperplasia or dysplasia      Status post D&C Hysteroscopy 1/10/2018 01/10/2018     Priority: Medium    S/P tubal ligation 05/16/2013     Priority: Medium    Hypercholesterolemia      Priority: Medium    Diverticulitis      Priority: Medium    Obesity      Priority: Medium    Osteopenia 05/09/2018     Priority: Low    Arthritis      Priority: Low    Bruises easily      Priority: Low    IBS (irritable bowel syndrome) 02/19/2019    Guerrero esophagus 11/01/2018    Anemia 10/04/2018    Constipation 10/04/2018    Uterine hyperplasia Simple and complex WITHOUT ATYPIA (7/17/2017) 09/19/2017     Discussed progesterone therapy and progression risk to Uterine cancer. Pt declined and medical intervention or hysterectomy due to medical co-morbidities. Plan repeat sampling of uterus in 6-9 months to re-evaluate.  Fibroid uterus 07/17/2017  7/17/17 Hysto, D/C, Myosure  07/17/2017    H/O dilation and curettage 07/17/2017    CAD S/P percutaneous coronary angioplasty 06/08/2017    Type 2 diabetes mellitus without complication, without long-term current use of insulin ([facility] [address] 06/08/2017    CKD (chronic kidney disease) stage 3, GFR 30-59 ml/min ([facility] [address] 06/08/2017    Diabetic polyneuropathy associated with type 2 diabetes mellitus ([facility] [address] 06/08/2017    Morbid obesity due to excess calories ([facility] [address] 06/08/2017    SOB (shortness of breath) 06/20/2016    DDD (degenerative disc disease)     Neuropathy           Hereditary Breast, Ovarian, Colon and Uterine Cancer screening Done. Tobacco & Secondary smoke risks reviewed; instructed on cessation and avoidance      Counseling Completed:  Preventative Health Recommendations and Follow up. The patient was informed of the recommended preventative health recommendations. 1. Annuals every year; Cytology collections per prevailing guidelines. 2. Mammograms begin every year at 35 yo if no abnormalities are found and no family     History. 3. Bone density studies every 2-3 years. Begin at 71 yo. If no fracture history or osteoporosis family history. (significant). 4. Colonoscopy begin at 38 yo. Repeat every ten years if negative and no family history. 5. Calcium of 3281-9839 mg/day in split dosing  6. Vitamin D 400-800 IU/day  7. All other preventative health recommendations will be managed by the patients Primary care physician. PLAN:  Return in about 1 month (around 5/31/2019) for DR Cuevas/ PMB. Pap smear completed.   UA C&S obtained. Prescription for diflucan and lotrisone cream sent to pharmacy. Encouraged to keep underwear dry and change underwear frequently if wet. Pelvic ultrasound ordered. Follow up with physician for PMB. Repeat Annual every 1 year  Cervical Cytology Evaluation begins at 24years old. If Negative Cytology, Follow-up screening per current guidelines. Mammograms every 1 year. If 35 yo and last mammogram was negative. Calcium and Vitamin D dosing reviewed. Colonoscopy screening reviewed as well as onset for bone density testing. Birth control and barrier recommendations discussed. STD counseling and prevention reviewed. Gardisil counseling completed for all patients 10-35 yo. Routine health maintenance per patients PCP. Orders Placed This Encounter   Procedures    [facility] DIGITAL SCREEN W CAD BILATERAL     Standing Status:   Future     Standing Expiration Date:   6/30/2020     Order Specific Question:   Reason for exam:     Answer:   screening    US Non OB Transvaginal     Standing Status:   Future     Standing Expiration Date:   11/3/2019     Order Specific Question:   Reason for exam:     Answer:   PMB    US Pelvis Complete     Standing Status:   Future     Standing Expiration Date:   8/3/2019     Order Specific Question:   Reason for exam:     Answer:   PMB    PAP SMEAR     Patient History:    Patient's last menstrual period was 06/29/2002 (within months).   OBGYN Status: Postmenopausal  Past Surgical History:  2005: BREAST BIOPSY      Comment:  Left---Benign  06/20/2016: CARDIAC CATHETERIZATION      Comment:  no intervention  2011: COLONOSCOPY      Comment:  diverticulitis  4/16/2019: COLONOSCOPY; N/A      Comment:  COLONOSCOPY WITH BIOPSY performed by Belia Argueta MD                at [address]  5/09: [address]  7/17/2017: DILATION AND CURETTAGE OF UTERUS; N/A      Comment:  DILATATION AND CURETTAGE OPERATIVE HYSTEROSCOPY WITH                MYOSURE, POLYPECTOMY, MYOMECTOMY performed by Krupa Garcia DO at [address]  13: HYSTEROSCOPY      Comment:  Operative Hscope with endometrial sampling, polypectomy                & myomectomy  Pete  2017: HYSTEROSCOPY  1/10/2018: [place] OFFICE/OUTPT VISIT,PROCEDURE ONLY; N/A      Comment:  DILATATION AND CURETTAGE HYSTEROSCOPY performed by                Christy Rosario DO at [address] OR  age 22: TONSILLECTOMY  2017: TOTAL KNEE ARTHROPLASTY; Left  1979: TUBAL LIGATION  2018: UPPER GASTROINTESTINAL ENDOSCOPY      Comment:  REESE'S  Medications/Contraceptives Affecting Cytology     Estrogen Combinations Disp Start End     estrogen, conjugated,-medroxyprogesterone (PREMPRO) 0.625-2.5 MG per   tablet          Class: Historical Med       Social History    Tobacco Use      Smoking status: Former Smoker        Packs/day: 0.50        Years: 40.00        Pack years: 21        Quit date: 2012        Years since quittin.2      Smokeless tobacco: Never Used       Standing Status:   Future     Standing Expiration Date:   2020     Order Specific Question:   Collection Type     Answer: Thin Prep     Order Specific Question:   Prior Abnormal Pap Test     Answer:   No     Order Specific Question:   Screening or Diagnostic     Answer:   Screening     Order Specific Question:   HPV Requested? Answer:   Yes     Order Specific Question:   High Risk Patient     Answer:   N/A    Urinalysis Reflex to Culture     Standing Status:   Future     Standing Expiration Date:   5/3/2020     Order Specific Question:   SPECIFY(EX-CATH,MIDSTREAM,CYSTO,ETC)?      Answer:   midstream

## 2019-05-05 LAB
CULTURE: ABNORMAL
Lab: ABNORMAL
SPECIMEN DESCRIPTION: ABNORMAL

## 2019-05-06 ENCOUNTER — TELEPHONE (OUTPATIENT)
Dept: OBGYN CLINIC | Age: 64
End: 2019-05-06

## 2019-05-06 LAB
HPV SAMPLE: NORMAL
HPV, GENOTYPE 16: NOT DETECTED
HPV, GENOTYPE 18: NOT DETECTED
HPV, HIGH RISK OTHER: NOT DETECTED
HPV, INTERPRETATION: NORMAL
SPECIMEN DESCRIPTION: NORMAL

## 2019-05-06 NOTE — TELEPHONE ENCOUNTER
----- Message from LG Stephen CNP sent at 5/6/2019  8:09 AM EDT -----  +E.  Coli  Cipro 500mg PO BID x 7days

## 2019-05-10 LAB — CYTOLOGY REPORT: NORMAL

## 2019-05-11 ENCOUNTER — HOSPITAL ENCOUNTER (OUTPATIENT)
Dept: WOMENS IMAGING | Age: 64
Discharge: HOME | End: 2019-05-13
Payer: MEDICARE

## 2019-05-11 DIAGNOSIS — Z12.39 BREAST CANCER SCREENING: ICD-10-CM

## 2019-05-11 PROCEDURE — 77063 BREAST TOMOSYNTHESIS BI: CPT

## 2019-05-13 PROBLEM — K63.5 COLON POLYP: Status: ACTIVE | Noted: 2019-04-16

## 2019-05-20 ENCOUNTER — OFFICE VISIT (OUTPATIENT)
Dept: GASTROENTEROLOGY | Age: 64
End: 2019-05-20
Payer: MEDICARE

## 2019-05-20 VITALS
BODY MASS INDEX: 36.84 KG/M2 | WEIGHT: 249.5 LBS | HEART RATE: 78 BPM | DIASTOLIC BLOOD PRESSURE: 89 MMHG | SYSTOLIC BLOOD PRESSURE: 177 MMHG

## 2019-05-20 DIAGNOSIS — K58.2 IRRITABLE BOWEL SYNDROME WITH BOTH CONSTIPATION AND DIARRHEA: Primary | ICD-10-CM

## 2019-05-20 DIAGNOSIS — Q43.8 TORTUOUS COLON: ICD-10-CM

## 2019-05-20 DIAGNOSIS — K59.01 SLOW TRANSIT CONSTIPATION: ICD-10-CM

## 2019-05-20 DIAGNOSIS — K22.70 BARRETT'S ESOPHAGUS WITHOUT DYSPLASIA: ICD-10-CM

## 2019-05-20 PROCEDURE — 99214 OFFICE O/P EST MOD 30 MIN: CPT | Performed by: INTERNAL MEDICINE

## 2019-05-20 ASSESSMENT — ENCOUNTER SYMPTOMS
ABDOMINAL PAIN: 1
BACK PAIN: 1
COUGH: 0
SORE THROAT: 0
SINUS PRESSURE: 1
RECTAL PAIN: 0
VOMITING: 0
CONSTIPATION: 1
WHEEZING: 0
ANAL BLEEDING: 0
DIARRHEA: 1
BLOOD IN STOOL: 0
VOICE CHANGE: 0
CHOKING: 0
NAUSEA: 0
ALLERGIC/IMMUNOLOGIC NEGATIVE: 1
TROUBLE SWALLOWING: 1
RESPIRATORY NEGATIVE: 1
ABDOMINAL DISTENTION: 0

## 2019-05-20 NOTE — PROGRESS NOTES
Subjective:      Patient ID: Marco Antonio Huber is a 61 y.o. female. Dr. Joe Huddleston MD our mutual patient Marco Antonio Huber was seen  for   1. Irritable bowel syndrome with both constipation and diarrhea    2. Guerrero's esophagus without dysplasia    3. Tortuous colon    4. Slow transit constipation     . The patient is here as a follow up of her recent GI procedure. The results have been sent to you separately   The findings were explained to the patient in detail and biopsies were also discussed   with her    Very tortious colon   Retained stools  No gross pathology  Diverticulosis  Doing little better   Has GERD  No bleeding  No melena  Has obesity   No nausea or any vomiting  No smoking  No ETOH   No fam hx of colon cancer    Past Medical, Family, and Social History reviewed and does contribute to the patient presenting condition. patient\"s PMH/PSH,SH,PSYCH hx, MEDs, ALLERGIES, and ROS was all reviewed and updated ion the appropriate sections    Gastroesophageal Reflux   She complains of abdominal pain (off and on) and chest pain (off and on). She reports no choking, no coughing, no nausea, no sore throat or no wheezing. Associated symptoms include fatigue. Review of Systems   Constitutional: Positive for fatigue. Negative for appetite change and unexpected weight change. HENT: Positive for postnasal drip, sinus pressure and trouble swallowing (off and on). Negative for dental problem, sore throat and voice change. Eyes: Positive for visual disturbance. Respiratory: Negative. Negative for cough, choking and wheezing. Cardiovascular: Positive for chest pain (off and on) and leg swelling (off and on). Negative for palpitations. Gastrointestinal: Positive for abdominal pain (off and on), constipation and diarrhea. Negative for abdominal distention, anal bleeding, blood in stool, nausea, rectal pain and vomiting. Gerd symptoms   Endocrine: Negative. Genitourinary: Negative. current use of insulin ([facility]) E11.9    CKD (chronic kidney disease) stage 3, GFR 30-59 ml/min ([facility]) N18.3    Diabetic polyneuropathy associated with type 2 diabetes mellitus ([facility]) E11.42    Morbid obesity due to excess calories ([facility]) E66.01    Fibroid uterus D25.9    7/17/17 Hysto, D/C, Myosure  Z98.890    H/O dilation and curettage Z98.890    Uterine hyperplasia Simple and complex WITHOUT ATYPIA (7/17/2017) N85.2    History of total left knee replacement Z96.652    Status post D&C Hysteroscopy 1/10/2018 Z98.890    History of D&C Hysteroscopy 1/10/2018 Z98.890    Osteopenia M85.80    Anemia D64.9    Constipation K59.00    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    IBS (irritable bowel syndrome) K58.9    Colon polyp K63.5     Overall better GERD issues        Plan:      Pt seems to have signs and symptoms consistent with GERD, acid indigestion and heartburns. She was discussed  in detail about some possible life style and dietary modifications. She was stressed about the maintenance  of appropriate weight and effect of obesity contributing to reflux symptoms. Routine exercise was streesed. Avoidance of Caffeine, nicotine and chocolate were explained. Pt was asked to avoid spices grease and fried food. Advices were also given about avoidance of any kind of fast foods, soda pops and high energy drinks. Pt was advised to place two small block under the head end of the bed which may help with night time reflux. Was advised not to eat any thin at least 2-3 hrs before going to bed and walk especially after dinner    Pt has verbalized understanding and agreement to this plan. Pt was advised in detail about some life style and dietary modifications. She was advised about avoidance of caffeine, nicotine and chocolate. Pt was also told to stay away from any kind of fast foods, soda pops.  She was also advised to avoid lots of spices, grease and fried food etc.     Instructions were also given about trying to arrange the timing, quality and quantity of food. Instructions were given about using ample amount of fiber including dietary and supplemental fiber either metamucil, bennafiber or citrucell etc.  Pt was advised about drinking ample amount of water without any colors or chemicals. Stress was given about regular exercise. Pt has verbalized understanding and agreement to these modifications. The patient was instructed to start taking some OTC Probiotics products   These are available over the counter at the Pharmacy stores and Grocery stores  He was explained about the beneficial effects they have in the GI track  They will help to establish the good bacterial rosy and will help with the digestion and bowel movements  The patient has verbalized understanding and agreement to this plan    Pt was discussed in detail about the possible side effects of proton pump inhibiter therapy. She was explained about the possibility of calcium and magnesium malabsorption and was advised to start taking calcium supplements with Vit D. Some over the counter regimens were explained to patient. Some dietary advices were also given. She has verbalized understanding and agreement to this.

## 2019-05-21 ENCOUNTER — TELEPHONE (OUTPATIENT)
Dept: OBGYN CLINIC | Age: 64
End: 2019-05-21

## 2019-05-21 RX ORDER — CIPROFLOXACIN 500 MG/1
500 TABLET, FILM COATED ORAL 2 TIMES DAILY
Qty: 14 TABLET | Refills: 0 | Status: SHIPPED | OUTPATIENT
Start: 2019-05-21 | End: 2019-05-28

## 2019-05-21 NOTE — TELEPHONE ENCOUNTER
----- Message from LG Mcdonald CNP sent at 5/6/2019  8:09 AM EDT -----  +E.  Coli  Cipro 500mg PO BID x 7days

## 2019-05-22 ENCOUNTER — HOSPITAL ENCOUNTER (OUTPATIENT)
Age: 64
Discharge: HOME | End: 2019-05-22
Payer: MEDICARE

## 2019-05-22 DIAGNOSIS — N18.30 CKD (CHRONIC KIDNEY DISEASE) STAGE 3, GFR 30-59 ML/MIN (HCC): ICD-10-CM

## 2019-05-22 DIAGNOSIS — E55.9 VITAMIN D DEFICIENCY: ICD-10-CM

## 2019-05-22 DIAGNOSIS — I12.9 BENIGN HYPERTENSION WITH CKD (CHRONIC KIDNEY DISEASE) STAGE III (HCC): ICD-10-CM

## 2019-05-22 DIAGNOSIS — E13.22 SECONDARY DIABETES MELLITUS WITH STAGE 3 CHRONIC KIDNEY DISEASE (GFR 30-59) (HCC): ICD-10-CM

## 2019-05-22 DIAGNOSIS — N18.30 SECONDARY DIABETES MELLITUS WITH STAGE 3 CHRONIC KIDNEY DISEASE (GFR 30-59) (HCC): ICD-10-CM

## 2019-05-22 DIAGNOSIS — E83.39 HYPOPHOSPHATEMIA: ICD-10-CM

## 2019-05-22 DIAGNOSIS — N39.0 RECURRENT UTI: ICD-10-CM

## 2019-05-22 DIAGNOSIS — N18.30 BENIGN HYPERTENSION WITH CKD (CHRONIC KIDNEY DISEASE) STAGE III (HCC): ICD-10-CM

## 2019-05-22 LAB
ABSOLUTE EOS #: 0.1 K/UL (ref 0–0.4)
ABSOLUTE IMMATURE GRANULOCYTE: ABNORMAL K/UL (ref 0–0.3)
ABSOLUTE LYMPH #: 1.7 K/UL (ref 1–4.8)
ABSOLUTE MONO #: 0.5 K/UL (ref 0.1–1.3)
ALBUMIN SERPL-MCNC: 4 G/DL (ref 3.5–5.2)
ALBUMIN/GLOBULIN RATIO: ABNORMAL (ref 1–2.5)
ALP BLD-CCNC: 48 U/L (ref 35–104)
ALT SERPL-CCNC: 16 U/L (ref 5–33)
ANION GAP SERPL CALCULATED.3IONS-SCNC: 10 MMOL/L (ref 9–17)
AST SERPL-CCNC: 17 U/L
BASOPHILS # BLD: 1 % (ref 0–2)
BASOPHILS ABSOLUTE: 0 K/UL (ref 0–0.2)
BILIRUB SERPL-MCNC: 0.42 MG/DL (ref 0.3–1.2)
BUN BLDV-MCNC: 17 MG/DL (ref 8–23)
BUN/CREAT BLD: ABNORMAL (ref 9–20)
CALCIUM SERPL-MCNC: 9.4 MG/DL (ref 8.6–10.4)
CHLORIDE BLD-SCNC: 100 MMOL/L (ref 98–107)
CO2: 27 MMOL/L (ref 20–31)
CREAT SERPL-MCNC: 1.39 MG/DL (ref 0.5–0.9)
DIFFERENTIAL TYPE: ABNORMAL
EOSINOPHILS RELATIVE PERCENT: 2 % (ref 0–4)
GFR AFRICAN AMERICAN: 46 ML/MIN
GFR NON-AFRICAN AMERICAN: 38 ML/MIN
GFR SERPL CREATININE-BSD FRML MDRD: ABNORMAL ML/MIN/{1.73_M2}
GFR SERPL CREATININE-BSD FRML MDRD: ABNORMAL ML/MIN/{1.73_M2}
GLUCOSE BLD-MCNC: 103 MG/DL (ref 70–99)
HCT VFR BLD CALC: 40.2 % (ref 36–46)
HEMOGLOBIN: 13.4 G/DL (ref 12–16)
IMMATURE GRANULOCYTES: ABNORMAL %
LYMPHOCYTES # BLD: 22 % (ref 24–44)
MAGNESIUM: 1.8 MG/DL (ref 1.6–2.6)
MCH RBC QN AUTO: 30.6 PG (ref 26–34)
MCHC RBC AUTO-ENTMCNC: 33.2 G/DL (ref 31–37)
MCV RBC AUTO: 92.1 FL (ref 80–100)
MONOCYTES # BLD: 7 % (ref 1–7)
NRBC AUTOMATED: ABNORMAL PER 100 WBC
PDW BLD-RTO: 14.9 % (ref 11.5–14.9)
PHOSPHORUS: 2.6 MG/DL (ref 2.6–4.5)
PLATELET # BLD: 221 K/UL (ref 150–450)
PLATELET ESTIMATE: ABNORMAL
PMV BLD AUTO: 8 FL (ref 6–12)
POTASSIUM SERPL-SCNC: 4.3 MMOL/L (ref 3.7–5.3)
RBC # BLD: 4.37 M/UL (ref 4–5.2)
RBC # BLD: ABNORMAL 10*6/UL
SEG NEUTROPHILS: 68 % (ref 36–66)
SEGMENTED NEUTROPHILS ABSOLUTE COUNT: 5.3 K/UL (ref 1.3–9.1)
SODIUM BLD-SCNC: 137 MMOL/L (ref 135–144)
T3 FREE: 2.96 PG/ML (ref 2.02–4.43)
THYROXINE, FREE: 1.35 NG/DL (ref 0.93–1.7)
TOTAL PROTEIN: 7 G/DL (ref 6.4–8.3)
TSH SERPL DL<=0.05 MIU/L-ACNC: 2.23 MIU/L (ref 0.3–5)
VITAMIN D 25-HYDROXY: 35.1 NG/ML (ref 30–100)
WBC # BLD: 7.7 K/UL (ref 3.5–11)
WBC # BLD: ABNORMAL 10*3/UL

## 2019-05-22 PROCEDURE — 83735 ASSAY OF MAGNESIUM: CPT

## 2019-05-22 PROCEDURE — 84439 ASSAY OF FREE THYROXINE: CPT

## 2019-05-22 PROCEDURE — 84443 ASSAY THYROID STIM HORMONE: CPT

## 2019-05-22 PROCEDURE — 36415 COLL VENOUS BLD VENIPUNCTURE: CPT

## 2019-05-22 PROCEDURE — 80053 COMPREHEN METABOLIC PANEL: CPT

## 2019-05-22 PROCEDURE — 82306 VITAMIN D 25 HYDROXY: CPT

## 2019-05-22 PROCEDURE — 84100 ASSAY OF PHOSPHORUS: CPT

## 2019-05-22 PROCEDURE — 84481 FREE ASSAY (FT-3): CPT

## 2019-05-22 PROCEDURE — 85025 COMPLETE CBC W/AUTO DIFF WBC: CPT

## 2019-05-23 ENCOUNTER — OFFICE VISIT (OUTPATIENT)
Dept: OBGYN CLINIC | Age: 64
End: 2019-05-23
Payer: MEDICARE

## 2019-05-23 DIAGNOSIS — N95.0 PMB (POSTMENOPAUSAL BLEEDING): Primary | ICD-10-CM

## 2019-05-23 DIAGNOSIS — N95.0 PMB (POSTMENOPAUSAL BLEEDING): ICD-10-CM

## 2019-05-23 PROCEDURE — 76830 TRANSVAGINAL US NON-OB: CPT | Performed by: OBSTETRICS & GYNECOLOGY

## 2019-05-28 ENCOUNTER — TELEPHONE (OUTPATIENT)
Dept: OBGYN CLINIC | Age: 64
End: 2019-05-28

## 2019-05-28 NOTE — TELEPHONE ENCOUNTER
----- Message from LG Jones CNP sent at 5/28/2019  8:06 AM EDT -----  Patient with thickened endometrial lining. Will need follow up with physician to discuss plan of care. Please schedule follow up and let patient know results.

## 2019-06-11 ENCOUNTER — OFFICE VISIT (OUTPATIENT)
Dept: OBGYN CLINIC | Age: 64
End: 2019-06-11
Payer: MEDICARE

## 2019-06-11 VITALS
WEIGHT: 249 LBS | HEART RATE: 72 BPM | HEIGHT: 69 IN | BODY MASS INDEX: 36.88 KG/M2 | SYSTOLIC BLOOD PRESSURE: 136 MMHG | DIASTOLIC BLOOD PRESSURE: 88 MMHG

## 2019-06-11 DIAGNOSIS — N85.2 UTERINE HYPERPLASIA: ICD-10-CM

## 2019-06-11 DIAGNOSIS — I25.10 CAD S/P PERCUTANEOUS CORONARY ANGIOPLASTY: ICD-10-CM

## 2019-06-11 DIAGNOSIS — Z98.61 CAD S/P PERCUTANEOUS CORONARY ANGIOPLASTY: ICD-10-CM

## 2019-06-11 DIAGNOSIS — E11.9 TYPE 2 DIABETES MELLITUS WITHOUT COMPLICATION, WITHOUT LONG-TERM CURRENT USE OF INSULIN (HCC): ICD-10-CM

## 2019-06-11 DIAGNOSIS — I25.2 HISTORY OF MYOCARDIAL INFARCTION: ICD-10-CM

## 2019-06-11 DIAGNOSIS — Z98.890 HISTORY OF D&C: ICD-10-CM

## 2019-06-11 DIAGNOSIS — R93.89 ENDOMETRIAL THICKENING ON ULTRASOUND: ICD-10-CM

## 2019-06-11 DIAGNOSIS — N95.0 PMB (POSTMENOPAUSAL BLEEDING): Primary | ICD-10-CM

## 2019-06-11 DIAGNOSIS — I10 ESSENTIAL HYPERTENSION: ICD-10-CM

## 2019-06-11 PROCEDURE — 99214 OFFICE O/P EST MOD 30 MIN: CPT | Performed by: OBSTETRICS & GYNECOLOGY

## 2019-07-05 ENCOUNTER — PREP FOR PROCEDURE (OUTPATIENT)
Dept: OBGYN CLINIC | Age: 64
End: 2019-07-05

## 2019-07-05 ENCOUNTER — OFFICE VISIT (OUTPATIENT)
Dept: OBGYN CLINIC | Age: 64
End: 2019-07-05
Payer: MEDICARE

## 2019-07-05 VITALS
DIASTOLIC BLOOD PRESSURE: 84 MMHG | BODY MASS INDEX: 36.88 KG/M2 | SYSTOLIC BLOOD PRESSURE: 136 MMHG | WEIGHT: 249 LBS | HEART RATE: 92 BPM | HEIGHT: 69 IN

## 2019-07-05 DIAGNOSIS — R93.89 ENDOMETRIAL THICKENING ON ULTRASOUND: ICD-10-CM

## 2019-07-05 DIAGNOSIS — N95.0 PMB (POSTMENOPAUSAL BLEEDING): Primary | ICD-10-CM

## 2019-07-05 DIAGNOSIS — Z96.652 HISTORY OF TOTAL LEFT KNEE REPLACEMENT: ICD-10-CM

## 2019-07-05 DIAGNOSIS — I25.2 HISTORY OF MYOCARDIAL INFARCTION: ICD-10-CM

## 2019-07-05 DIAGNOSIS — E11.9 TYPE 2 DIABETES MELLITUS WITHOUT COMPLICATION, WITHOUT LONG-TERM CURRENT USE OF INSULIN (HCC): ICD-10-CM

## 2019-07-05 DIAGNOSIS — Z98.890 HISTORY OF D&C: ICD-10-CM

## 2019-07-05 DIAGNOSIS — N85.2 UTERINE HYPERPLASIA: ICD-10-CM

## 2019-07-05 DIAGNOSIS — Z01.818 PREOP TESTING: Primary | ICD-10-CM

## 2019-07-05 DIAGNOSIS — I10 ESSENTIAL HYPERTENSION: ICD-10-CM

## 2019-07-05 DIAGNOSIS — Z98.61 CAD S/P PERCUTANEOUS CORONARY ANGIOPLASTY: ICD-10-CM

## 2019-07-05 DIAGNOSIS — I25.10 CAD S/P PERCUTANEOUS CORONARY ANGIOPLASTY: ICD-10-CM

## 2019-07-05 PROCEDURE — 99213 OFFICE O/P EST LOW 20 MIN: CPT | Performed by: OBSTETRICS & GYNECOLOGY

## 2019-07-05 RX ORDER — SODIUM CHLORIDE, SODIUM LACTATE, POTASSIUM CHLORIDE, CALCIUM CHLORIDE 600; 310; 30; 20 MG/100ML; MG/100ML; MG/100ML; MG/100ML
INJECTION, SOLUTION INTRAVENOUS CONTINUOUS
Status: CANCELLED | OUTPATIENT
Start: 2019-07-05

## 2019-07-05 RX ORDER — SODIUM CHLORIDE 0.9 % (FLUSH) 0.9 %
10 SYRINGE (ML) INJECTION EVERY 12 HOURS SCHEDULED
Status: CANCELLED | OUTPATIENT
Start: 2019-07-05

## 2019-07-05 RX ORDER — SODIUM CHLORIDE 0.9 % (FLUSH) 0.9 %
10 SYRINGE (ML) INJECTION PRN
Status: CANCELLED | OUTPATIENT
Start: 2019-07-05

## 2019-07-05 NOTE — PROGRESS NOTES
[facility] Gynecology  [address]; [address]  [place] [phone] Fax        Fahad Rivas  1955  Primary Care Physician: Marin Manuel MD        [address]: [facility]      2019  MEDICAID CONSENT COMPLETED: No      HPI: Fahad Rivas is a 61 y.o. female   PMB while on prempro by an alternate physician. BL knee replacement hx. Will get antibiotics, position awake and yellofin stirrups. Cleared by cardiology. Relevant Past History:   Patient Active Problem List    Diagnosis Date Noted    Uterine hyperplasia Simple and complex WITHOUT ATYPIA (2017) 2017     Priority: High     Discussed progesterone therapy and progression risk to Uterine cancer. Pt declined and medical intervention or hysterectomy due to medical co-morbidities. Plan repeat sampling of uterus in 6-9 months to re-evaluate.       History of total left knee replacement 2017     Priority: High    PMB (postmenopausal bleeding) 2013     Priority: High    History of myocardial infarction      Priority: High     STENTS Prophylaxis if SURGERY      Hypertension      Priority: High    Tobacco abuse      Priority: High    History of D&C Hysteroscopy 1/10/2018 2018     Priority: Medium     Pathology without Hyperplasia or dysplasia      Status post D&C Hysteroscopy 1/10/2018 01/10/2018     Priority: Medium    17 Hysto, D/C, Myosure  2017     Priority: Medium    S/P tubal ligation 2013     Priority: Medium    Hypercholesterolemia      Priority: Medium    Diverticulitis      Priority: Medium    Obesity      Priority: Medium    Osteopenia 2018     Priority: Low    Arthritis      Priority: Low    Bruises easily      Priority: Low    Colon polyp 2019     hyperplastic polyp      IBS (irritable bowel syndrome) 2019    Guerrero esophagus 2018    Anemia 10/04/2018    Constipation to max of 3 total doses. If no relief after 1 dose, call 911.  metFORMIN (GLUCOPHAGE) 500 MG tablet Take 500 mg by mouth 2 times daily (with meals)       metoprolol (LOPRESSOR) 50 MG tablet Take 50 mg by mouth 2 times daily.  atorvastatin (LIPITOR) 80 MG tablet Take 80 mg by mouth daily.  aspirin 81 MG EC tablet Take 81 mg by mouth daily. No current facility-administered medications for this visit. ALLERGIES:  Allergies as of 07/05/2019 - Review Complete 07/05/2019   Allergen Reaction Noted    Pcn [penicillins] Hives 12/14/2011    Amlodipine  03/13/2017    Codeine Other (See Comments) 12/14/2011    Gabapentin Other (See Comments) 02/21/2017    Lyrica [pregabalin] Other (See Comments) 01/03/2018    Sulfa antibiotics Other (See Comments) 06/08/2017    Oxycodone-acetaminophen Itching and Rash 03/28/2018           REVIEW OF SYSTEMS:      General appearance:Appears healthy. Alert; in no acute distress. Pleasant. HEENT: Glasses or contacts no and yes  CARDIOVASCULAR: Denies: chest pain, dyspnea on exertion, palpitations  RESPIRATORY: Denies: cough, shortness of breath, wheezing  GI: Denies: abdominal pain, flank pain, nausea, vomiting, diarrhea  : Denies: dysuria, frequency/urgency, hematuria, pelvic pain  MUSCULOSKELETAL: Denies: back pain, joint swelling, muscle pain  SKIN: Denies: rash, hives. HEMATOLOGIC: Denies Bleeding Disorder, Coagulopathy;  + 2018 Transfusion  NEUROLOGIC: Denies Migraines, Headaches, CVA, TIA  ENDOCRINE: +DM                PHYSICAL EXAM:  Blood pressure 136/84, pulse 92, height 5' 9\" (1.753 m), weight 249 lb (112.9 kg), last menstrual period 06/29/2002, not currently breastfeeding. General Appearance:  awake, alert, oriented, in no acute distress, well developed, well nourished, in no acute distress   Skin:  Skin color, texture, turgor normal. No rashes or lesions  Mouth/Throat:  Mucosa moist.  No lesions.   Pharynx without erythema, edema or Value Ref Range    TSH 2.23 0.30 - 5.00 mIU/L     Cytology Report 05/03/2019  8:36  [place] [address]   ANATOMIC PATHOLOGY   [address] [place] [address], [facility] [address] [place], [address]   [phone]   Fax: [phone]   GYNECOLOGIC CYTOLOGY REPORT     Patient Name: Christiano Tolentino   MR#: [id number]   Specimen #[id number]   Source:   1: Cervical material, (ThinPrep vial, Imaging-assisted review)     Clinical History   Estrogen   Z01.419 Routine gyn exam without abnormal findings   Z11.51 Encounter for screening for HPV   Co-Test:  ThinPrep Pap with high risk HPV testing     INTERPRETATION     Cervical material, (ThinPrep vial, Imaging-assisted review):   Specimen Adequacy:        Satisfactory for evaluation.        - Endocervical/transformation zone component present. Descriptive Diagnosis:        Negative for intraepithelial lesion or malignancy.    Comments:        High Risk HPV testing was ordered. Cytotechnologist:   ODESSA Coburn(ASCP)   **Electronically Signed Out**   letitia/5/10/2019           Procedure/Addendum   HPV Procedure Report     Date Ordered:     5/6/2019     Status: Signed   Out        Date Complete:     5/6/2019     By: Janice Forrest CT(ASCP)        Date Reported:     5/10/2019       INTERPRETATION   Roche HPV DNA High Risk                                   HPV Sample               Thin Prep                    (Ref Range)   HPV Type 16               Not Detected                    (Not   Detected)   HPV Type 18               Not Detected                    (Not   Detected)   Other High Risk HPV     Not Detected                    (Not Detected)        This test amplifies and detects DNA of 14 high-risk HPV types   associated with cervical cancer and its precursor lesions (HPV types   16, 18, 31, 33, 35, 39, 45, 51, 52, 56, 58, 59, 66, and 68).    Sensitivity may be affected by specimen collection methods, stage of  S/P tubal ligation 05/16/2013     Priority: Medium    Hypercholesterolemia      Priority: Medium    Diverticulitis      Priority: Medium    Obesity      Priority: Medium    Osteopenia 05/09/2018     Priority: Low    Arthritis      Priority: Low    Bruises easily      Priority: Low    Colon polyp 04/16/2019     Overview Note:     hyperplastic polyp      IBS (irritable bowel syndrome) 02/19/2019    Guerrero esophagus 11/01/2018    Anemia 10/04/2018    Constipation 10/04/2018    Fibroid uterus 07/17/2017    H/O dilation and curettage 07/17/2017    CAD S/P percutaneous coronary angioplasty 06/08/2017    Type 2 diabetes mellitus without complication, without long-term current use of insulin ([facility] 06/08/2017    CKD (chronic kidney disease) stage 3, GFR 30-59 ml/min ([facility] 06/08/2017    Diabetic polyneuropathy associated with type 2 diabetes mellitus ([facility] 06/08/2017    Morbid obesity due to excess calories ([facility] 06/08/2017    SOB (shortness of breath) 06/20/2016    DDD (degenerative disc disease)     Neuropathy          Permanent Sterilization Completed: Yes     Plan:  1. D&C hscope possible Myosure  2. Position awake  3. Yellofins stirrups  4. Antibiotics preop due to BL knee replacement-Clindamycin (PCN Allergy)  5. AM of surgery FBS due DM hx  6. Cardiology surgical clearance completed see scanned document  7. STOP ASA 7 days before surgery    No orders of the defined types were placed in this encounter. Counseling: The patient was counseled on all options both medical and surgical, conservative as well as definitive. She has elected to proceed with the procedure as stated above. The patient was counseled on the procedure. Risks and complications were reviewed in detail. The patients orders, labs, consents have been completed. The history and physical as well as all supporting surgical documentation will be forwarded to the pre-operative holding area.     The patient is aware that this procedure may not alleviate her symptoms. That there may be a necessity for a second surgery and that there may be an incomplete removal of abnormal tissue.                    ________________________________________D. O. Date:_______________  Cody Jackson DO        Patient was seen with total face to face time of 15 minutes. More than 50% of this visit was on counseling and education regarding her    Diagnosis Orders   1. PMB (postmenopausal bleeding)     2. History of D&C Hysteroscopy 1/10/2018     3. Endometrial thickening on ultrasound     4. History of myocardial infarction     5. CAD S/P percutaneous coronary angioplasty     6. Type 2 diabetes mellitus without complication, without long-term current use of insulin (HCC)     7. Essential hypertension     8. Uterine hyperplasia Simple and complex WITHOUT ATYPIA (7/17/2017)     9. History of total left knee replacement      and her options. She was also counseled on her preventative health maintenance recommendations and follow-up.

## 2019-07-05 NOTE — H&P (VIEW-ONLY)
Problem: Patient Care Overview  Goal: Plan of Care Review  Outcome: Ongoing (interventions implemented as appropriate)  Pt free from falls, injury or any further trauma throughout shift. Pt AAOx4. Continued medications as ordered. VSS. R foot elevated on pillows. Comlpaints of pain that is moderately controlled with IV medications. Will cont to monitor.    06/28/17 5983   Coping/Psychosocial   Plan Of Care Reviewed With patient          COLONOSCOPY      diverticulitis    COLONOSCOPY N/A 2019    hyperplastic polyp    CORONARY ANGIOPLASTY WITH STENT PLACEMENT      DILATION AND CURETTAGE OF UTERUS N/A 2017    DILATATION AND CURETTAGE OPERATIVE HYSTEROSCOPY WITH MYOSURE, POLYPECTOMY, MYOMECTOMY performed by Manuel Jacobson DO at [address] HYSTEROSCOPY  13    Operative Hscope with endometrial sampling, polypectomy & myomectomy [facility]    HYSTEROSCOPY  2017    [place] OFFICE/OUTPT VISIT,PROCEDURE ONLY N/A 1/10/2018    DILATATION AND CURETTAGE HYSTEROSCOPY performed by Manuel Jacobson DO at [address]   age 22   [address] Left 2017    TOTAL KNEE ARTHROPLASTY Right 2018    TUBAL LIGATION  1979    UPPER GASTROINTESTINAL ENDOSCOPY  2018    [facility]       Social History     Socioeconomic History    Marital status:      Spouse name: Not on file    Number of children: Not on file    Years of education: Not on file    Highest education level: Not on file   Occupational History    Not on file   Social Needs    Financial resource strain: Not on file    Food insecurity:     Worry: Not on file     Inability: Not on file    Transportation needs:     Medical: Not on file     Non-medical: Not on file   Tobacco Use    Smoking status: Former Smoker     Packs/day: 0.50     Years: 40.00     Pack years: 20.00     Last attempt to quit: 2012     Years since quittin.4    Smokeless tobacco: Never Used   Substance and Sexual Activity    Alcohol use: Yes     Comment: Occassional    Drug use: No    Sexual activity: Never     Birth control/protection: Post-menopausal   Lifestyle    Physical activity:     Days per week: Not on file     Minutes per session: Not on file    Stress: Not on file   Relationships    Social connections:     Talks on phone: Not on file     Gets together: Not on file     Attends Worship service: Not on file     Active member of club or organization: Not on file     Attends meetings of clubs or organizations: Not on file     Relationship status: Not on file    Intimate partner violence:     Fear of current or ex partner: Not on file     Emotionally abused: Not on file     Physically abused: Not on file     Forced sexual activity: Not on file   Other Topics Concern    Not on file   Social History Narrative    Not on file       Psychosocial History: stable    MEDICATIONS:  Current Outpatient Medications   Medication Sig Dispense Refill    Ertugliflozin L-PyroglutamicAc (STEGLATRO) 5 MG TABS Take 5 mg by mouth daily      albuterol sulfate HFA (PROVENTIL HFA) 108 (90 Base) MCG/ACT inhaler Inhale 2 puffs into the lungs      Biotin 10 MG CAPS Take by mouth      Calcium Ascorbate 500 MG TABS Take 500 mg by mouth      Lactobacillus Acid-Pectin (ACIDOPHILUS/PECTIN) CAPS Take by mouth      vitamin E 400 UNIT capsule Take 400 Units by mouth 2 times daily      pantoprazole (PROTONIX) 40 MG tablet Take 40 mg by mouth daily      Cyanocobalamin (VITAMIN B 12 PO) Take 500 mg by mouth      ferrous sulfate 325 (65 Fe) MG EC tablet Take 325 mg by mouth 3 times daily (with meals)      isosorbide mononitrate (IMDUR) 60 MG extended release tablet Take 60 mg by mouth daily      Calcium Carbonate-Vitamin D (OYSTER SHELL/VITAMIN D PO) Take 1 capsule by mouth 3 times daily      oxybutynin (DITROPAN) 5 MG tablet Take 5 mg by mouth 2 times daily      clotrimazole-betamethasone (LOTRISONE) 1-0.05 % cream Apply topically 2 times daily. 1 Tube 2    HYDROcodone-acetaminophen (NORCO) 5-325 MG per tablet Take 1 tablet by mouth every 12 hours as needed for Pain.       tiZANidine (ZANAFLEX) 4 MG tablet Take 4 mg by mouth every 8 hours as needed      DULoxetine (CYMBALTA) 30 MG extended release capsule Take 60 mg by mouth 2 times daily       nitroGLYCERIN (NITROSTAT) 0.4 MG SL tablet Place 0.4 mg under the tongue every 5 minutes as needed for Chest pain up Value Ref Range    TSH 2.23 0.30 - 5.00 mIU/L     Cytology Report 05/03/2019  8:36  [place] [address]   ANATOMIC PATHOLOGY   [address] [place] [address], [facility] [address] [place], [address]   [phone]   Fax: [phone]   GYNECOLOGIC CYTOLOGY REPORT     Patient Name: Nadiya Moss   MR#: [id number]   Specimen #[id number]   Source:   1: Cervical material, (ThinPrep vial, Imaging-assisted review)     Clinical History   Estrogen   Z01.419 Routine gyn exam without abnormal findings   Z11.51 Encounter for screening for HPV   Co-Test:  ThinPrep Pap with high risk HPV testing     INTERPRETATION     Cervical material, (ThinPrep vial, Imaging-assisted review):   Specimen Adequacy:        Satisfactory for evaluation.        - Endocervical/transformation zone component present. Descriptive Diagnosis:        Negative for intraepithelial lesion or malignancy.    Comments:        High Risk HPV testing was ordered. Cytotechnologist:   ODESSA Roberto(ASCP)   **Electronically Signed Out**   letitia/5/10/2019           Procedure/Addendum   HPV Procedure Report     Date Ordered:     5/6/2019     Status: Signed   Out        Date Complete:     5/6/2019     By: Kristina SAXENA(ASCP)        Date Reported:     5/10/2019       INTERPRETATION   Roche HPV DNA High Risk                                   HPV Sample               Thin Prep                    (Ref Range)   HPV Type 16               Not Detected                    (Not   Detected)   HPV Type 18               Not Detected                    (Not   Detected)   Other High Risk HPV     Not Detected                    (Not Detected)        This test amplifies and detects DNA of 14 high-risk HPV types   associated with cervical cancer and its precursor lesions (HPV types   16, 18, 31, 33, 35, 39, 45, 51, 52, 56, 58, 59, 66, and 68).    Sensitivity may be affected by specimen collection methods, stage of  S/P tubal ligation 05/16/2013     Priority: Medium    Hypercholesterolemia      Priority: Medium    Diverticulitis      Priority: Medium    Obesity      Priority: Medium    Osteopenia 05/09/2018     Priority: Low    Arthritis      Priority: Low    Bruises easily      Priority: Low    Colon polyp 04/16/2019     Overview Note:     hyperplastic polyp      IBS (irritable bowel syndrome) 02/19/2019    Guerrero esophagus 11/01/2018    Anemia 10/04/2018    Constipation 10/04/2018    Fibroid uterus 07/17/2017    H/O dilation and curettage 07/17/2017    CAD S/P percutaneous coronary angioplasty 06/08/2017    Type 2 diabetes mellitus without complication, without long-term current use of insulin ([facility] 06/08/2017    CKD (chronic kidney disease) stage 3, GFR 30-59 ml/min ([facility] 06/08/2017    Diabetic polyneuropathy associated with type 2 diabetes mellitus ([facility] 06/08/2017    Morbid obesity due to excess calories ([facility] 06/08/2017    SOB (shortness of breath) 06/20/2016    DDD (degenerative disc disease)     Neuropathy          Permanent Sterilization Completed: Yes     Plan:  1. D&C hscope possible Myosure  2. Position awake  3. Yellofins stirrups  4. Antibiotics preop due to BL knee replacement-Clindamycin (PCN Allergy)  5. AM of surgery FBS due DM hx  6. Cardiology surgical clearance completed see scanned document  7. STOP ASA 7 days before surgery    No orders of the defined types were placed in this encounter. Counseling: The patient was counseled on all options both medical and surgical, conservative as well as definitive. She has elected to proceed with the procedure as stated above. The patient was counseled on the procedure. Risks and complications were reviewed in detail. The patients orders, labs, consents have been completed. The history and physical as well as all supporting surgical documentation will be forwarded to the pre-operative holding area.     The patient is aware that this

## 2019-07-17 ENCOUNTER — HOSPITAL ENCOUNTER (OUTPATIENT)
Dept: ULTRASOUND IMAGING | Age: 64
Discharge: HOME | End: 2019-07-19
Payer: MEDICARE

## 2019-07-17 DIAGNOSIS — E04.2 MULTINODULAR GOITER (NONTOXIC): ICD-10-CM

## 2019-07-17 PROCEDURE — 76536 US EXAM OF HEAD AND NECK: CPT

## 2019-07-18 ENCOUNTER — HOSPITAL ENCOUNTER (OUTPATIENT)
Age: 64
Setting detail: SPECIMEN
Discharge: HOME | End: 2019-07-18
Payer: MEDICARE

## 2019-07-18 ENCOUNTER — HOSPITAL ENCOUNTER (OUTPATIENT)
Dept: PREADMISSION TESTING | Age: 64
Discharge: HOME | End: 2019-07-22
Payer: MEDICARE

## 2019-07-18 ENCOUNTER — HOSPITAL ENCOUNTER (OUTPATIENT)
Dept: GENERAL RADIOLOGY | Age: 64
Discharge: HOME | End: 2019-07-20
Payer: MEDICARE

## 2019-07-18 ENCOUNTER — TELEPHONE (OUTPATIENT)
Dept: OBGYN CLINIC | Age: 64
End: 2019-07-18

## 2019-07-18 VITALS
SYSTOLIC BLOOD PRESSURE: 150 MMHG | DIASTOLIC BLOOD PRESSURE: 74 MMHG | WEIGHT: 246 LBS | TEMPERATURE: 97.9 F | OXYGEN SATURATION: 96 % | RESPIRATION RATE: 16 BRPM | BODY MASS INDEX: 36.43 KG/M2 | HEIGHT: 69 IN | HEART RATE: 59 BPM

## 2019-07-18 DIAGNOSIS — Z01.818 PREOP TESTING: ICD-10-CM

## 2019-07-18 LAB
-: ABNORMAL
ABO/RH: NORMAL
ABSOLUTE EOS #: 0.2 K/UL (ref 0–0.4)
ABSOLUTE IMMATURE GRANULOCYTE: ABNORMAL K/UL (ref 0–0.3)
ABSOLUTE LYMPH #: 1.5 K/UL (ref 1–4.8)
ABSOLUTE MONO #: 0.5 K/UL (ref 0.1–1.3)
AMORPHOUS: ABNORMAL
ANION GAP SERPL CALCULATED.3IONS-SCNC: 12 MMOL/L
ANION GAP SERPL CALCULATED.3IONS-SCNC: 12 MMOL/L (ref 9–17)
ANTIBODY SCREEN: NEGATIVE
ARM BAND NUMBER: NORMAL
BACTERIA: ABNORMAL
BASOPHILS # BLD: 1 % (ref 0–2)
BASOPHILS ABSOLUTE: 0 K/UL (ref 0–0.2)
BILIRUBIN URINE: NEGATIVE
BUN BLDV-MCNC: 14 MG/DL (ref 8–23)
BUN BLDV-MCNC: 14 MG/DL (ref 8–23)
BUN/CREAT BLD: ABNORMAL (ref 9–20)
BUN/CREAT BLD: ABNORMAL (ref 9–20)
CALCIUM SERPL-MCNC: 9.8 MG/DL (ref 8.6–10.4)
CALCIUM SERPL-MCNC: 9.8 MG/DL (ref 8.6–10.4)
CASTS UA: ABNORMAL /LPF
CHLORIDE BLD-SCNC: 104 MMOL/L (ref 98–107)
CHLORIDE BLD-SCNC: 104 MMOL/L (ref 98–107)
CO2: 26 MMOL/L (ref 20–31)
CO2: 26 MMOL/L (ref 20–31)
COLOR: YELLOW
COMMENT UA: ABNORMAL
CREAT SERPL-MCNC: 1.36 MG/DL (ref 0.5–0.9)
CREAT SERPL-MCNC: 1.36 MG/DL (ref 0.5–0.9)
CRYSTALS, UA: ABNORMAL /HPF
DIFFERENTIAL TYPE: ABNORMAL
EOSINOPHILS RELATIVE PERCENT: 3 % (ref 0–4)
EPITHELIAL CELLS UA: ABNORMAL /HPF
EXPIRATION DATE: NORMAL
GFR AFRICAN AMERICAN: 48 ML/MIN
GFR AFRICAN AMERICAN: 48 ML/MIN
GFR NON-AFRICAN AMERICAN: 39 ML/MIN
GFR NON-AFRICAN AMERICAN: 39 ML/MIN
GFR SERPL CREATININE-BSD FRML MDRD: ABNORMAL ML/MIN/{1.73_M2}
GLUCOSE BLD-MCNC: 143 MG/DL (ref 70–99)
GLUCOSE BLD-MCNC: 143 MG/DL (ref 70–99)
GLUCOSE URINE: ABNORMAL
HCT VFR BLD CALC: 43.3 % (ref 36–46)
HEMOGLOBIN: 14.1 G/DL (ref 12–16)
IMMATURE GRANULOCYTES: ABNORMAL %
INR BLD: 0.9
KETONES, URINE: NEGATIVE
LEUKOCYTE ESTERASE, URINE: ABNORMAL
LYMPHOCYTES # BLD: 22 % (ref 24–44)
MCH RBC QN AUTO: 30.5 PG (ref 26–34)
MCHC RBC AUTO-ENTMCNC: 32.6 G/DL (ref 31–37)
MCV RBC AUTO: 93.5 FL (ref 80–100)
MONOCYTES # BLD: 7 % (ref 1–7)
MUCUS: ABNORMAL
NITRITE, URINE: POSITIVE
NRBC AUTOMATED: ABNORMAL PER 100 WBC
OTHER OBSERVATIONS UA: ABNORMAL
PARTIAL THROMBOPLASTIN TIME: 30.4 SEC (ref 24–36)
PDW BLD-RTO: 13.9 % (ref 11.5–14.9)
PH UA: 5 (ref 5–8)
PLATELET # BLD: 208 K/UL (ref 150–450)
PLATELET ESTIMATE: ABNORMAL
PMV BLD AUTO: 8.9 FL (ref 6–12)
POTASSIUM SERPL-SCNC: 5.4 MMOL/L (ref 3.7–5.3)
POTASSIUM SERPL-SCNC: 5.4 MMOL/L (ref 3.7–5.3)
PROTEIN UA: NEGATIVE
PROTHROMBIN TIME: 12.3 SEC (ref 11.8–14.6)
RBC # BLD: 4.63 M/UL (ref 4–5.2)
RBC # BLD: ABNORMAL 10*6/UL
RBC UA: ABNORMAL /HPF
RENAL EPITHELIAL, UA: ABNORMAL /HPF
SEG NEUTROPHILS: 67 % (ref 36–66)
SEGMENTED NEUTROPHILS ABSOLUTE COUNT: 4.4 K/UL (ref 1.3–9.1)
SODIUM BLD-SCNC: 142 MMOL/L (ref 135–144)
SODIUM BLD-SCNC: 142 MMOL/L (ref 135–144)
SPECIFIC GRAVITY UA: 1.02 (ref 1–1.03)
TRICHOMONAS: ABNORMAL
TURBIDITY: ABNORMAL
URINE HGB: NEGATIVE
UROBILINOGEN, URINE: NORMAL
WBC # BLD: 6.6 K/UL (ref 3.5–11)
WBC # BLD: ABNORMAL 10*3/UL
WBC UA: ABNORMAL /HPF
YEAST: ABNORMAL

## 2019-07-18 PROCEDURE — 85730 THROMBOPLASTIN TIME PARTIAL: CPT

## 2019-07-18 PROCEDURE — 71046 X-RAY EXAM CHEST 2 VIEWS: CPT

## 2019-07-18 PROCEDURE — 86901 BLOOD TYPING SEROLOGIC RH(D): CPT

## 2019-07-18 PROCEDURE — 85610 PROTHROMBIN TIME: CPT

## 2019-07-18 PROCEDURE — 81001 URINALYSIS AUTO W/SCOPE: CPT

## 2019-07-18 PROCEDURE — 87086 URINE CULTURE/COLONY COUNT: CPT

## 2019-07-18 PROCEDURE — 87186 SC STD MICRODIL/AGAR DIL: CPT

## 2019-07-18 PROCEDURE — 86900 BLOOD TYPING SEROLOGIC ABO: CPT

## 2019-07-18 PROCEDURE — 36415 COLL VENOUS BLD VENIPUNCTURE: CPT

## 2019-07-18 PROCEDURE — 80048 BASIC METABOLIC PNL TOTAL CA: CPT

## 2019-07-18 PROCEDURE — 87088 URINE BACTERIA CULTURE: CPT

## 2019-07-18 PROCEDURE — 86850 RBC ANTIBODY SCREEN: CPT

## 2019-07-18 PROCEDURE — 85025 COMPLETE CBC W/AUTO DIFF WBC: CPT

## 2019-07-19 ENCOUNTER — TELEPHONE (OUTPATIENT)
Dept: OBGYN CLINIC | Age: 64
End: 2019-07-19

## 2019-07-19 ENCOUNTER — ANESTHESIA EVENT (OUTPATIENT)
Dept: OPERATING ROOM | Age: 64
End: 2019-07-19
Payer: MEDICARE

## 2019-07-19 LAB
CULTURE: ABNORMAL
Lab: ABNORMAL
SPECIMEN DESCRIPTION: ABNORMAL

## 2019-07-19 RX ORDER — SODIUM CHLORIDE 9 MG/ML
INJECTION, SOLUTION INTRAVENOUS CONTINUOUS
Status: CANCELLED | OUTPATIENT
Start: 2019-07-19

## 2019-07-19 RX ORDER — CIPROFLOXACIN 500 MG/1
500 TABLET, FILM COATED ORAL 2 TIMES DAILY
Qty: 20 TABLET | Refills: 0 | Status: SHIPPED | OUTPATIENT
Start: 2019-07-19 | End: 2019-07-29

## 2019-07-19 RX ORDER — LIDOCAINE HYDROCHLORIDE 10 MG/ML
1 INJECTION, SOLUTION EPIDURAL; INFILTRATION; INTRACAUDAL; PERINEURAL
Status: CANCELLED | OUTPATIENT
Start: 2019-07-19 | End: 2019-07-19

## 2019-07-19 RX ORDER — SODIUM CHLORIDE 0.9 % (FLUSH) 0.9 %
10 SYRINGE (ML) INJECTION PRN
Status: CANCELLED | OUTPATIENT
Start: 2019-07-19

## 2019-07-19 RX ORDER — SODIUM CHLORIDE 0.9 % (FLUSH) 0.9 %
10 SYRINGE (ML) INJECTION EVERY 12 HOURS SCHEDULED
Status: CANCELLED | OUTPATIENT
Start: 2019-07-19

## 2019-07-22 ENCOUNTER — TELEPHONE (OUTPATIENT)
Dept: OBGYN CLINIC | Age: 64
End: 2019-07-22

## 2019-07-25 ENCOUNTER — ANESTHESIA (OUTPATIENT)
Dept: OPERATING ROOM | Age: 64
End: 2019-07-25
Payer: MEDICARE

## 2019-07-25 ENCOUNTER — HOSPITAL ENCOUNTER (OUTPATIENT)
Age: 64
Setting detail: OUTPATIENT SURGERY
Discharge: HOME | End: 2019-07-25
Attending: OBSTETRICS & GYNECOLOGY | Admitting: OBSTETRICS & GYNECOLOGY
Payer: MEDICARE

## 2019-07-25 VITALS
RESPIRATION RATE: 16 BRPM | DIASTOLIC BLOOD PRESSURE: 67 MMHG | HEIGHT: 69 IN | SYSTOLIC BLOOD PRESSURE: 130 MMHG | TEMPERATURE: 98 F | OXYGEN SATURATION: 95 % | HEART RATE: 69 BPM | BODY MASS INDEX: 36.43 KG/M2 | WEIGHT: 246 LBS

## 2019-07-25 VITALS — OXYGEN SATURATION: 97 %

## 2019-07-25 DIAGNOSIS — G89.18 POST-OPERATIVE PAIN: Primary | ICD-10-CM

## 2019-07-25 PROBLEM — Z98.890 HISTORY OF D&C: Status: RESOLVED | Noted: 2018-01-30 | Resolved: 2019-07-25

## 2019-07-25 PROBLEM — Z98.890 STATUS POST D&C: Status: ACTIVE | Noted: 2019-07-25

## 2019-07-25 LAB
GLUCOSE BLD-MCNC: 109 MG/DL (ref 65–105)
GLUCOSE BLD-MCNC: 112 MG/DL (ref 65–105)
POTASSIUM SERPL-SCNC: 3.9 MMOL/L (ref 3.7–5.3)

## 2019-07-25 PROCEDURE — 84132 ASSAY OF SERUM POTASSIUM: CPT

## 2019-07-25 PROCEDURE — 2500000003 HC RX 250 WO HCPCS: Performed by: OBSTETRICS & GYNECOLOGY

## 2019-07-25 PROCEDURE — 2709999900 HC NON-CHARGEABLE SUPPLY: Performed by: OBSTETRICS & GYNECOLOGY

## 2019-07-25 PROCEDURE — 58558 HYSTEROSCOPY BIOPSY: CPT | Performed by: OBSTETRICS & GYNECOLOGY

## 2019-07-25 PROCEDURE — 36415 COLL VENOUS BLD VENIPUNCTURE: CPT

## 2019-07-25 PROCEDURE — 82947 ASSAY GLUCOSE BLOOD QUANT: CPT

## 2019-07-25 PROCEDURE — 2580000003 HC RX 258: Performed by: ANESTHESIOLOGY

## 2019-07-25 PROCEDURE — 7100000031 HC ASPR PHASE II RECOVERY - ADDTL 15 MIN: Performed by: OBSTETRICS & GYNECOLOGY

## 2019-07-25 PROCEDURE — 3600000002 HC SURGERY LEVEL 2 BASE: Performed by: OBSTETRICS & GYNECOLOGY

## 2019-07-25 PROCEDURE — 6360000002 HC RX W HCPCS: Performed by: NURSE ANESTHETIST, CERTIFIED REGISTERED

## 2019-07-25 PROCEDURE — 3700000000 HC ANESTHESIA ATTENDED CARE: Performed by: OBSTETRICS & GYNECOLOGY

## 2019-07-25 PROCEDURE — 88305 TISSUE EXAM BY PATHOLOGIST: CPT

## 2019-07-25 PROCEDURE — 2500000003 HC RX 250 WO HCPCS: Performed by: NURSE ANESTHETIST, CERTIFIED REGISTERED

## 2019-07-25 PROCEDURE — 3600000012 HC SURGERY LEVEL 2 ADDTL 15MIN: Performed by: OBSTETRICS & GYNECOLOGY

## 2019-07-25 PROCEDURE — 3700000001 HC ADD 15 MINUTES (ANESTHESIA): Performed by: OBSTETRICS & GYNECOLOGY

## 2019-07-25 PROCEDURE — 7100000001 HC PACU RECOVERY - ADDTL 15 MIN: Performed by: OBSTETRICS & GYNECOLOGY

## 2019-07-25 PROCEDURE — 7100000000 HC PACU RECOVERY - FIRST 15 MIN: Performed by: OBSTETRICS & GYNECOLOGY

## 2019-07-25 PROCEDURE — 7100000030 HC ASPR PHASE II RECOVERY - FIRST 15 MIN: Performed by: OBSTETRICS & GYNECOLOGY

## 2019-07-25 RX ORDER — KETOROLAC TROMETHAMINE 30 MG/ML
INJECTION, SOLUTION INTRAMUSCULAR; INTRAVENOUS PRN
Status: DISCONTINUED | OUTPATIENT
Start: 2019-07-25 | End: 2019-07-25 | Stop reason: SDUPTHER

## 2019-07-25 RX ORDER — SODIUM CHLORIDE 9 MG/ML
INJECTION, SOLUTION INTRAVENOUS CONTINUOUS
Status: DISCONTINUED | OUTPATIENT
Start: 2019-07-25 | End: 2019-07-25 | Stop reason: HOSPADM

## 2019-07-25 RX ORDER — FENTANYL CITRATE 50 UG/ML
INJECTION, SOLUTION INTRAMUSCULAR; INTRAVENOUS PRN
Status: DISCONTINUED | OUTPATIENT
Start: 2019-07-25 | End: 2019-07-25 | Stop reason: SDUPTHER

## 2019-07-25 RX ORDER — DOCUSATE SODIUM 100 MG/1
100 CAPSULE, LIQUID FILLED ORAL 2 TIMES DAILY PRN
Qty: 60 CAPSULE | Refills: 0 | Status: SHIPPED | OUTPATIENT
Start: 2019-07-25 | End: 2019-09-18

## 2019-07-25 RX ORDER — CLINDAMYCIN PHOSPHATE 900 MG/50ML
900 INJECTION INTRAVENOUS ONCE
Status: COMPLETED | OUTPATIENT
Start: 2019-07-25 | End: 2019-07-25

## 2019-07-25 RX ORDER — LIDOCAINE HYDROCHLORIDE 10 MG/ML
1 INJECTION, SOLUTION EPIDURAL; INFILTRATION; INTRACAUDAL; PERINEURAL
Status: DISCONTINUED | OUTPATIENT
Start: 2019-07-25 | End: 2019-07-25 | Stop reason: HOSPADM

## 2019-07-25 RX ORDER — PROPOFOL 10 MG/ML
INJECTION, EMULSION INTRAVENOUS PRN
Status: DISCONTINUED | OUTPATIENT
Start: 2019-07-25 | End: 2019-07-25 | Stop reason: SDUPTHER

## 2019-07-25 RX ORDER — SODIUM CHLORIDE 0.9 % (FLUSH) 0.9 %
10 SYRINGE (ML) INJECTION PRN
Status: DISCONTINUED | OUTPATIENT
Start: 2019-07-25 | End: 2019-07-25 | Stop reason: HOSPADM

## 2019-07-25 RX ORDER — ONDANSETRON 2 MG/ML
INJECTION INTRAMUSCULAR; INTRAVENOUS PRN
Status: DISCONTINUED | OUTPATIENT
Start: 2019-07-25 | End: 2019-07-25 | Stop reason: SDUPTHER

## 2019-07-25 RX ORDER — SODIUM CHLORIDE 0.9 % (FLUSH) 0.9 %
10 SYRINGE (ML) INJECTION EVERY 12 HOURS SCHEDULED
Status: DISCONTINUED | OUTPATIENT
Start: 2019-07-25 | End: 2019-07-25 | Stop reason: HOSPADM

## 2019-07-25 RX ORDER — DEXAMETHASONE SODIUM PHOSPHATE 4 MG/ML
INJECTION, SOLUTION INTRA-ARTICULAR; INTRALESIONAL; INTRAMUSCULAR; INTRAVENOUS; SOFT TISSUE PRN
Status: DISCONTINUED | OUTPATIENT
Start: 2019-07-25 | End: 2019-07-25 | Stop reason: SDUPTHER

## 2019-07-25 RX ORDER — HYDROCODONE BITARTRATE AND ACETAMINOPHEN 5; 325 MG/1; MG/1
1 TABLET ORAL EVERY 6 HOURS PRN
Qty: 15 TABLET | Refills: 0 | Status: SHIPPED | OUTPATIENT
Start: 2019-07-25 | End: 2019-08-01

## 2019-07-25 RX ORDER — SODIUM CHLORIDE, SODIUM LACTATE, POTASSIUM CHLORIDE, CALCIUM CHLORIDE 600; 310; 30; 20 MG/100ML; MG/100ML; MG/100ML; MG/100ML
INJECTION, SOLUTION INTRAVENOUS CONTINUOUS
Status: DISCONTINUED | OUTPATIENT
Start: 2019-07-25 | End: 2019-07-25 | Stop reason: HOSPADM

## 2019-07-25 RX ORDER — LIDOCAINE HYDROCHLORIDE 10 MG/ML
INJECTION, SOLUTION EPIDURAL; INFILTRATION; INTRACAUDAL; PERINEURAL PRN
Status: DISCONTINUED | OUTPATIENT
Start: 2019-07-25 | End: 2019-07-25 | Stop reason: SDUPTHER

## 2019-07-25 RX ORDER — ACETAMINOPHEN 325 MG/1
650 TABLET ORAL EVERY 6 HOURS PRN
Qty: 40 TABLET | Refills: 0 | Status: SHIPPED | OUTPATIENT
Start: 2019-07-25 | End: 2020-06-02

## 2019-07-25 RX ADMIN — CLINDAMYCIN PHOSPHATE 900 MG: 900 INJECTION, SOLUTION INTRAVENOUS at 10:51

## 2019-07-25 RX ADMIN — PROPOFOL 110 MG: 10 INJECTION, EMULSION INTRAVENOUS at 10:57

## 2019-07-25 RX ADMIN — FENTANYL CITRATE 25 MCG: 50 INJECTION, SOLUTION INTRAMUSCULAR; INTRAVENOUS at 11:01

## 2019-07-25 RX ADMIN — LIDOCAINE HYDROCHLORIDE 50 MG: 10 INJECTION, SOLUTION EPIDURAL; INFILTRATION; INTRACAUDAL; PERINEURAL at 10:57

## 2019-07-25 RX ADMIN — KETOROLAC TROMETHAMINE 30 MG: 30 INJECTION, SOLUTION INTRAMUSCULAR; INTRAVENOUS at 11:16

## 2019-07-25 RX ADMIN — ONDANSETRON 4 MG: 2 INJECTION INTRAMUSCULAR; INTRAVENOUS at 11:16

## 2019-07-25 RX ADMIN — DEXAMETHASONE SODIUM PHOSPHATE 4 MG: 4 INJECTION, SOLUTION INTRA-ARTICULAR; INTRALESIONAL; INTRAMUSCULAR; INTRAVENOUS; SOFT TISSUE at 11:16

## 2019-07-25 RX ADMIN — SODIUM CHLORIDE: 9 INJECTION, SOLUTION INTRAVENOUS at 09:56

## 2019-07-25 ASSESSMENT — PAIN DESCRIPTION - LOCATION: LOCATION: ABDOMEN

## 2019-07-25 ASSESSMENT — PULMONARY FUNCTION TESTS
PIF_VALUE: 3
PIF_VALUE: 4
PIF_VALUE: 4
PIF_VALUE: 3
PIF_VALUE: 1
PIF_VALUE: 3
PIF_VALUE: 0
PIF_VALUE: 3
PIF_VALUE: 1
PIF_VALUE: 3
PIF_VALUE: 1
PIF_VALUE: 0
PIF_VALUE: 3
PIF_VALUE: 3
PIF_VALUE: 1
PIF_VALUE: 3
PIF_VALUE: 3
PIF_VALUE: 0
PIF_VALUE: 4
PIF_VALUE: 3
PIF_VALUE: 1
PIF_VALUE: 3
PIF_VALUE: 6
PIF_VALUE: 4
PIF_VALUE: 0
PIF_VALUE: 4
PIF_VALUE: 4
PIF_VALUE: 3
PIF_VALUE: 4
PIF_VALUE: 3
PIF_VALUE: 4

## 2019-07-25 ASSESSMENT — PAIN DESCRIPTION - ORIENTATION: ORIENTATION: MID

## 2019-07-25 ASSESSMENT — PAIN SCALES - GENERAL
PAINLEVEL_OUTOF10: 0
PAINLEVEL_OUTOF10: 0
PAINLEVEL_OUTOF10: 4
PAINLEVEL_OUTOF10: 0

## 2019-07-25 ASSESSMENT — PAIN DESCRIPTION - PROGRESSION: CLINICAL_PROGRESSION: GRADUALLY WORSENING

## 2019-07-25 ASSESSMENT — PAIN DESCRIPTION - PAIN TYPE: TYPE: SURGICAL PAIN

## 2019-07-25 ASSESSMENT — PAIN DESCRIPTION - DESCRIPTORS
DESCRIPTORS: BURNING
DESCRIPTORS: BURNING

## 2019-07-25 ASSESSMENT — ENCOUNTER SYMPTOMS
STRIDOR: 0
SHORTNESS OF BREATH: 1

## 2019-07-25 ASSESSMENT — PAIN DESCRIPTION - ONSET: ONSET: AWAKENED FROM SLEEP

## 2019-07-25 ASSESSMENT — PAIN - FUNCTIONAL ASSESSMENT: PAIN_FUNCTIONAL_ASSESSMENT: 0-10

## 2019-07-25 ASSESSMENT — LIFESTYLE VARIABLES: SMOKING_STATUS: 0

## 2019-07-25 ASSESSMENT — PAIN DESCRIPTION - FREQUENCY: FREQUENCY: CONTINUOUS

## 2019-07-25 NOTE — INTERVAL H&P NOTE
capsule by mouth 3 times daily      oxybutynin (DITROPAN) 5 MG tablet Take 5 mg by mouth 2 times daily      clotrimazole-betamethasone (LOTRISONE) 1-0.05 % cream Apply topically 2 times daily. 1 Tube 2    HYDROcodone-acetaminophen (NORCO) 5-325 MG per tablet Take 1 tablet by mouth every 12 hours as needed for Pain.  tiZANidine (ZANAFLEX) 4 MG tablet Take 4 mg by mouth every 8 hours as needed      DULoxetine (CYMBALTA) 30 MG extended release capsule Take 60 mg by mouth 2 times daily       nitroGLYCERIN (NITROSTAT) 0.4 MG SL tablet Place 0.4 mg under the tongue every 5 minutes as needed for Chest pain up to max of 3 total doses. If no relief after 1 dose, call 911.  metFORMIN (GLUCOPHAGE) 500 MG tablet Take 500 mg by mouth 2 times daily (with meals)       metoprolol (LOPRESSOR) 50 MG tablet Take 50 mg by mouth 2 times daily.  atorvastatin (LIPITOR) 80 MG tablet Take 80 mg by mouth daily.  aspirin 81 MG EC tablet Take 81 mg by mouth daily.          Current Facility-Administered Medications   Medication Dose Route Frequency Provider Last Rate Last Dose    0.9 % sodium chloride infusion   Intravenous Continuous Nawaf Mejia MD        lidocaine PF 1 % injection 1 mL  1 mL Intradermal Once PRN Jennifer Putnam MD        sodium chloride flush 0.9 % injection 10 mL  10 mL Intravenous 2 times per day Jennifer Putnam MD        sodium chloride flush 0.9 % injection 10 mL  10 mL Intravenous PRN Jennifer Putnam MD        lactated ringers infusion   Intravenous Continuous Zandra Bye, DO        sodium chloride flush 0.9 % injection 10 mL  10 mL Intravenous 2 times per day Zandra Bye, DO        sodium chloride flush 0.9 % injection 10 mL  10 mL Intravenous PRN Zandra Bye, DO        clindamycin (CLEOCIN) 900 mg in dextrose 5 % 50 mL IVPB  900 mg Intravenous Once Zandra Bye, DO               Allergies as of 06/25/2019 - Review Complete 06/11/2019   Allergen Reaction 1/10/2018 01/10/2018       Priority: Medium    7/17/17 Hysto, D/C, Myosure  07/17/2017       Priority: Medium    S/P tubal ligation 05/16/2013       Priority: Medium    Hypercholesterolemia         Priority: Medium    Diverticulitis         Priority: Medium    Obesity         Priority: Medium    Osteopenia 05/09/2018       Priority: Low    Arthritis         Priority: Low    Bruises easily         Priority: Low    Colon polyp 04/16/2019       Overview Note:       hyperplastic polyp       IBS (irritable bowel syndrome) 02/19/2019    Guerrero esophagus 11/01/2018    Anemia 10/04/2018    Constipation 10/04/2018    Fibroid uterus 07/17/2017    H/O dilation and curettage 07/17/2017    CAD S/P percutaneous coronary angioplasty 06/08/2017    Type 2 diabetes mellitus without complication, without long-term current use of insulin ([facility] [address] 06/08/2017    CKD (chronic kidney disease) stage 3, GFR 30-59 ml/min ([facility] [address] 06/08/2017    Diabetic polyneuropathy associated with type 2 diabetes mellitus ([facility] [address] 06/08/2017    Morbid obesity due to excess calories ([address] 06/08/2017    SOB (shortness of breath) 06/20/2016    DDD (degenerative disc disease)      Neuropathy              Permanent Sterilization Completed: Yes      Plan:  1. D&C hscope possible Myosure  2. Position awake  3. Yellofins stirrups  4. Antibiotics preop due to BL knee replacement-Clindamycin (PCN Allergy)  5. AM of surgery FBS due DM hx  6. Cardiology surgical clearance completed see scanned document  7.  STOP ASA 7 days before surgery     No orders of the defined types were placed in this encounter.  [facility] Outpatient Visit on 07/18/2019   Component Date Value Ref Range Status    PTT 07/18/2019 30.4  24.0 - 36.0 sec Final    Comment:       IV Heparin Therapy Range:      62.0-94.0            Glucose 07/18/2019 143* 70 - 99 mg/dL Final    BUN 07/18/2019 14  8 - 23 mg/dL Final    CREATININE 07/18/2019 1.36* 0.50 - 0.90 mg/dL Final    NOT REPORTED     aztreonam Value in next row Sensitive       <=1SUSCEPTIBLE     ceFAZolin Value in next row Sensitive       <=4SUSCEPTIBLE     ceFAZolin Value in next row Sensitive       <=4SUSCEPTIBLE     cefepime Value in next row        NOT REPORTED     cefTRIAXone Value in next row Sensitive       <=1SUSCEPTIBLE     ciprofloxacin Value in next row Sensitive       <=0.25SUSCEPTIBLE     ertapenem Value in next row        NOT REPORTED     Confirmatory Extended Spectrum Beta-Lactamase Value in next row Negative       NOT REPORTED     gentamicin Value in next row Sensitive       <=1SUSCEPTIBLE     meropenem Value in next row        NOT REPORTED     nitrofurantoin Value in next row Sensitive       <=16SUSCEPTIBLE     tigecycline Value in next row        NOT REPORTED     tobramycin Value in next row Sensitive       <=1SUSCEPTIBLE     trimethoprim-sulfamethoxazole Value in next row Sensitive       <=20SUSCEPTIBLE     piperacillin-tazobactam Value in next row Sensitive       <=4SUSCEPTIBLE   APTT   Result Value Ref Range    PTT 30.4 24.0 - 36.0 sec   Basic Metabolic Panel w/ Reflex to MG   Result Value Ref Range    Glucose 143 (H) 70 - 99 mg/dL    BUN 14 8 - 23 mg/dL    CREATININE 1.36 (H) 0.50 - 0.90 mg/dL    Bun/Cre Ratio NOT REPORTED 9 - 20    Calcium 9.8 8.6 - 10.4 mg/dL    Sodium 142 135 - 144 mmol/L    Potassium 5.4 (H) 3.7 - 5.3 mmol/L    Chloride 104 98 - 107 mmol/L    CO2 26 20 - 31 mmol/L    Anion Gap 12 9 - 17 mmol/L    GFR Non-African American 39 (L) >60 mL/min    GFR  48 (L) >60 mL/min    GFR Comment          GFR Staging NOT REPORTED    CBC auto differential   Result Value Ref Range    WBC 6.6 3.5 - 11.0 k/uL    RBC 4.63 4.0 - 5.2 m/uL    Hemoglobin 14.1 12.0 - 16.0 g/dL    Hematocrit 43.3 36 - 46 %    MCV 93.5 80 - 100 fL    MCH 30.5 26 - 34 pg    MCHC 32.6 31 - 37 g/dL    RDW 13.9 11.5 - 14.9 %    Platelets 031 590 - 296 k/uL    MPV 8.9 6.0 - 12.0 fL    NRBC Automated NOT REPORTED per 100 WBC    Differential Type NOT REPORTED     Seg Neutrophils 67 (H) 36 - 66 %    Lymphocytes 22 (L) 24 - 44 %    Monocytes 7 1 - 7 %    Eosinophils % 3 0 - 4 %    Basophils 1 0 - 2 %    Immature Granulocytes NOT REPORTED 0 %    Segs Absolute 4.40 1.3 - 9.1 k/uL    Absolute Lymph # 1.50 1.0 - 4.8 k/uL    Absolute Mono # 0.50 0.1 - 1.3 k/uL    Absolute Eos # 0.20 0.0 - 0.4 k/uL    Basophils # 0.00 0.0 - 0.2 k/uL    Absolute Immature Granulocyte NOT REPORTED 0.00 - 0.30 k/uL    WBC Morphology NOT REPORTED     RBC Morphology NOT REPORTED     Platelet Estimate NOT REPORTED    Protime-INR   Result Value Ref Range    Protime 12.3 11.8 - 14.6 sec    INR 0.9    Urinalysis   Result Value Ref Range    Color, UA YELLOW YELLOW    Turbidity UA CLOUDY (A) CLEAR    Glucose, Ur 3+ (A) NEGATIVE    Bilirubin Urine NEGATIVE NEGATIVE    Ketones, Urine NEGATIVE NEGATIVE    Specific Gravity, UA 1.019 1.000 - 1.030    Urine Hgb NEGATIVE NEGATIVE    pH, UA 5.0 5.0 - 8.0    Protein, UA NEGATIVE NEGATIVE    Urobilinogen, Urine Normal Normal    Nitrite, Urine POSITIVE (A) NEGATIVE    Leukocyte Esterase, Urine MOD (A) NEGATIVE    Urinalysis Comments NOT REPORTED    Microscopic Urinalysis   Result Value Ref Range    -          WBC, UA 20 TO 50 /HPF    RBC, UA 0 TO 2 /HPF    Casts UA NOT REPORTED /LPF    Crystals UA NOT REPORTED None /HPF    Epithelial Cells UA 2 TO 5 /HPF    Renal Epithelial, Urine NOT REPORTED 0 /HPF    Bacteria, UA MANY (A) None    Mucus, UA NOT REPORTED None    Trichomonas, UA NOT REPORTED None    Amorphous, UA NOT REPORTED None    Other Observations UA NOT REPORTED NOT REQ. Yeast, UA NOT REPORTED None   TYPE AND SCREEN   Result Value Ref Range    Expiration Date 07/28/2019     Arm Band Number [id number]     ABO/Rh O POSITIVE     Antibody Screen NEGATIVE            The patient is ready for transport to the operative suite.       Electronically signed by Nain Malik on 7/25/2019 at 9:36 AM

## 2019-07-25 NOTE — ANESTHESIA PRE PROCEDURE
Department of Anesthesiology  Preprocedure Note       Name:  Michelle Randall   Age:  61 y.o.  :  1955                                          MRN:  [id number]         Date:  2019      Surgeon: Gemini Toure):  Eleuterio Yu DO    Procedure: DILATATION AND CURETTAGE HYSTEROSCOPY POSSIBLE MYOSURE (N/A )    Medications prior to admission:   Prior to Admission medications    Medication Sig Start Date End Date Taking? Authorizing Provider   ciprofloxacin (CIPRO) 500 MG tablet Take 1 tablet by mouth 2 times daily for 10 days 19 Yes Polinalucille Murillo A BREE HarrisN - CNP   Ertugliflozin L-PyroglutamicAc (STEGLATRO) 5 MG TABS Take 5 mg by mouth daily   Yes Historical Provider, MD   albuterol sulfate HFA (PROVENTIL HFA) 108 (90 Base) MCG/ACT inhaler Inhale 2 puffs into the lungs 19  Yes Historical Provider, MD   Lactobacillus Acid-Pectin (ACIDOPHILUS/PECTIN) CAPS Take by mouth   Yes Historical Provider, MD   pantoprazole (PROTONIX) 40 MG tablet Take 40 mg by mouth daily   Yes Historical Provider, MD   isosorbide mononitrate (IMDUR) 60 MG extended release tablet Take 60 mg by mouth daily 18  Yes Historical Provider, MD   oxybutynin (DITROPAN) 5 MG tablet Take 5 mg by mouth 2 times daily   Yes Historical Provider, MD   clotrimazole-betamethasone (LOTRISONE) 1-0.05 % cream Apply topically 2 times daily. 18  Yes Kenyatta Branch BREE HarrisN - CNP   HYDROcodone-acetaminophen (NORCO) 5-325 MG per tablet Take 1 tablet by mouth every 12 hours as needed for Pain. Yes Historical Provider, MD   metoprolol (LOPRESSOR) 50 MG tablet Take 50 mg by mouth 2 times daily. Yes Historical Provider, MD   atorvastatin (LIPITOR) 80 MG tablet Take 80 mg by mouth daily.      Yes Historical Provider, MD   sodium polystyrene (SPS) 15 GM/60ML suspension Take 120 mLs by mouth once for 1 dose 19  Dorma Foil, MD   Biotin 10 MG CAPS Take by mouth    Historical Provider, MD   Calcium Ascorbate 500 MG TABS Take

## 2019-07-25 NOTE — OP NOTE
above counseling.       Berl Daughters, DO      Electronically signed by Berl Daughters, DO on 7/25/19 at 11:36 AM

## 2019-07-25 NOTE — H&P
Gynecologic Surgery Pre-Operative Attestation Note:      Hospital: [address] [place], [place] [address]   Date: 2019  Time: 10:45 AM      Patient Name: Polina Swenson  Patient : 1955  Room/Bed: [facility] OR Pool/NONE  Admission Date/Time: 2019  8:58 AM  MRN #: [id number]  [facility] #: [id number]          Attending Physician Statement  I have discussed the care of Polina Swenson, including pertinent history and exam findings,  with the resident. I have reviewed their note in the electronic medical record. I have seen and examined the patient in the pre-op holding area and the key elements of all parts of the encounter have been performed/reviewed by me . I agree with the assessment, plan and orders as documented by the resident. The procedure risks and complications were discussed as well as the possibility of the need for a second surgery and incomplete removal of abnormal tissue.       Vitals:    19 0933 19 0948   BP: (!) 159/80    Pulse: 61    Resp: 18    Temp: 98.2 °F (36.8 °C)    TempSrc: Oral    SpO2: 97%    Weight:  246 lb (111.6 kg)   Height:  5' 9\" (1.753 m)         Admission on 2019   Component Date Value Ref Range Status    Potassium 2019 3.9  3.7 - 5.3 mmol/L Final    POC Glucose 2019 112* 65 - 105 mg/dL Final   Hospital Outpatient Visit on 2019   Component Date Value Ref Range Status    PTT 2019 30.4  24.0 - 36.0 sec Final    Comment:       IV Heparin Therapy Range:      62.0-94.0            Glucose 2019 143* 70 - 99 mg/dL Final    BUN 2019 14  8 - 23 mg/dL Final    CREATININE 2019 1.36* 0.50 - 0.90 mg/dL Final    Bun/Cre Ratio 2019 NOT REPORTED   -  Final    Calcium 2019 9.8  8.6 - 10.4 mg/dL Final    Sodium 2019 142  135 - 144 mmol/L Final    Potassium 2019 5.4* 3.7 - 5.3 mmol/L Final    Chloride 2019 104  98 - 107 mmol/L Final    CO2 2019 26  20 - 31 mmol/L Final    Anion Gap 2019 12  9 07/18/2019 12.3  11.8 - 14.6 sec Final    INR 07/18/2019 0.9   Final    Comment:       Non-therapeutic Range:     INR = 0.9-1.2  Therapeutic Range: Moderate Anticoagulant Intensity:     INR = 2.0-3.0   High Anticoagulant Intensity:     INR = 2.5-3.5            Expiration Date 07/18/2019 07/28/2019   Final    Arm Band Number 07/18/2019 [id number]   Final    ABO/Rh 07/18/2019 O POSITIVE   Final    Antibody Screen 07/18/2019 NEGATIVE   Final    Color, UA 07/18/2019 YELLOW  YELLOW Final    Turbidity UA 07/18/2019 CLOUDY* CLEAR Final    Glucose, Ur 07/18/2019 3+* NEGATIVE Final    Bilirubin Urine 07/18/2019 NEGATIVE  NEGATIVE Final    Ketones, Urine 07/18/2019 NEGATIVE  NEGATIVE Final    Specific Gravity, UA 07/18/2019 1.019  1.000 - 1.030 Final    Urine Hgb 07/18/2019 NEGATIVE  NEGATIVE Final    pH, UA 07/18/2019 5.0  5.0 - 8.0 Final    Protein, UA 07/18/2019 NEGATIVE  NEGATIVE Final    Urobilinogen, Urine 07/18/2019 Normal  Normal Final    Nitrite, Urine 07/18/2019 POSITIVE* NEGATIVE Final    Leukocyte Esterase, Urine 07/18/2019 MOD* NEGATIVE Final    Urinalysis Comments 07/18/2019 NOT REPORTED   Final    Specimen Description 07/18/2019 . CLEAN CATCH URINE   Final    Special Requests 07/18/2019 NOT REPORTED   Final    Culture 07/18/2019 ESCHERICHIA COLI >[id number] CFU/ML*  Final    - 07/18/2019        Final    WBC, UA 07/18/2019 20 TO 50  /HPF Final    RBC, UA 07/18/2019 0 TO 2  /HPF Final    Casts UA 07/18/2019 NOT REPORTED  /LPF Final    Crystals UA 07/18/2019 NOT REPORTED  None /HPF Final    Epithelial Cells UA 07/18/2019 2 TO 5  /HPF Final    Renal Epithelial, Urine 07/18/2019 NOT REPORTED  0 /HPF Final    Bacteria, UA 07/18/2019 MANY* None Final    Mucus, UA 07/18/2019 NOT REPORTED  None Final    Trichomonas, UA 07/18/2019 NOT REPORTED  None Final    Amorphous, UA 07/18/2019 NOT REPORTED  None Final    Other Observations UA 07/18/2019 NOT REPORTED  NOT REQ.  Final    Yeast, UA 07/18/2019 NOT REPORTED  None Final   Hospital Outpatient Visit on 07/18/2019   Component Date Value Ref Range Status    Glucose 07/18/2019 143* 70 - 99 mg/dL Final    BUN 07/18/2019 14  8 - 23 mg/dL Final    CREATININE 07/18/2019 1.36* 0.50 - 0.90 mg/dL Final    Bun/Cre Ratio 07/18/2019 NOT REPORTED  9 - 20 Final    Calcium 07/18/2019 9.8  8.6 - 10.4 mg/dL Final    Sodium 07/18/2019 142  135 - 144 mmol/L Final    Potassium 07/18/2019 5.4* 3.7 - 5.3 mmol/L Final    Chloride 07/18/2019 104  98 - 107 mmol/L Final    CO2 07/18/2019 26  20 - 31 mmol/L Final    Anion Gap 07/18/2019 12  mmol/L Final    GFR Non- 07/18/2019 39* >60 mL/min Final    GFR  07/18/2019 48* >60 mL/min Final    GFR Comment 07/18/2019        Final    Comment: Average GFR for 61-76 years old:   80 mL/min/1.73sq m  Chronic Kidney Disease:   <60 mL/min/1.73sq m  Kidney failure:   <15 mL/min/1.73sq m              eGFR calculated using average adult body mass. Additional eGFR calculator available at:        [email or web address]            GFR Staging 07/18/2019 NOT REPORTED   Final   ]      Assessment:       Diagnosis Orders   1. PMB (postmenopausal bleeding)      2. History of D&C Hysteroscopy 1/10/2018      3. Endometrial thickening on ultrasound      4. History of myocardial infarction      5. CAD S/P percutaneous coronary angioplasty      6. Type 2 diabetes mellitus without complication, without long-term current use of insulin (HCC)      7. Essential hypertension      8. Uterine hyperplasia Simple and complex WITHOUT ATYPIA (7/17/2017)      9. History of total left knee replacement      10. UTI-po abx outpt tx            Patient Active Problem List     Diagnosis Date Noted    Uterine hyperplasia Simple and complex WITHOUT ATYPIA (7/17/2017) 09/19/2017       Priority: High       Overview Note:       Discussed progesterone therapy and progression risk to Uterine cancer.  Pt

## 2019-07-26 LAB — SURGICAL PATHOLOGY REPORT: NORMAL

## 2019-08-08 ENCOUNTER — OFFICE VISIT (OUTPATIENT)
Dept: OBGYN CLINIC | Age: 64
End: 2019-08-08
Payer: MEDICARE

## 2019-08-08 VITALS
SYSTOLIC BLOOD PRESSURE: 118 MMHG | DIASTOLIC BLOOD PRESSURE: 82 MMHG | BODY MASS INDEX: 35.84 KG/M2 | WEIGHT: 242 LBS | HEART RATE: 69 BPM | HEIGHT: 69 IN

## 2019-08-08 DIAGNOSIS — Z98.890 HISTORY OF D&C: ICD-10-CM

## 2019-08-08 DIAGNOSIS — R93.89 ENDOMETRIAL THICKENING ON ULTRASOUND: ICD-10-CM

## 2019-08-08 DIAGNOSIS — N95.0 PMB (POSTMENOPAUSAL BLEEDING): Primary | ICD-10-CM

## 2019-08-08 PROCEDURE — 99213 OFFICE O/P EST LOW 20 MIN: CPT | Performed by: OBSTETRICS & GYNECOLOGY

## 2019-09-18 ENCOUNTER — OFFICE VISIT (OUTPATIENT)
Dept: INTERNAL MEDICINE CLINIC | Age: 64
End: 2019-09-18
Payer: MEDICARE

## 2019-09-18 ENCOUNTER — HOSPITAL ENCOUNTER (OUTPATIENT)
Age: 64
Setting detail: SPECIMEN
Discharge: HOME | End: 2019-09-18
Payer: MEDICARE

## 2019-09-18 VITALS
BODY MASS INDEX: 35.55 KG/M2 | HEIGHT: 69 IN | DIASTOLIC BLOOD PRESSURE: 72 MMHG | OXYGEN SATURATION: 95 % | HEART RATE: 58 BPM | SYSTOLIC BLOOD PRESSURE: 138 MMHG | WEIGHT: 240 LBS

## 2019-09-18 DIAGNOSIS — R29.6 RECURRENT FALLS: ICD-10-CM

## 2019-09-18 DIAGNOSIS — E66.01 MORBID OBESITY DUE TO EXCESS CALORIES (HCC): ICD-10-CM

## 2019-09-18 DIAGNOSIS — Z98.61 CAD S/P PERCUTANEOUS CORONARY ANGIOPLASTY: Primary | ICD-10-CM

## 2019-09-18 DIAGNOSIS — I25.10 CAD S/P PERCUTANEOUS CORONARY ANGIOPLASTY: Primary | ICD-10-CM

## 2019-09-18 DIAGNOSIS — G89.29 CHRONIC BACK PAIN, UNSPECIFIED BACK LOCATION, UNSPECIFIED BACK PAIN LATERALITY: ICD-10-CM

## 2019-09-18 DIAGNOSIS — N18.30 CKD (CHRONIC KIDNEY DISEASE) STAGE 3, GFR 30-59 ML/MIN (HCC): ICD-10-CM

## 2019-09-18 DIAGNOSIS — M54.9 CHRONIC BACK PAIN, UNSPECIFIED BACK LOCATION, UNSPECIFIED BACK PAIN LATERALITY: ICD-10-CM

## 2019-09-18 DIAGNOSIS — E11.9 TYPE 2 DIABETES MELLITUS WITHOUT COMPLICATION, WITHOUT LONG-TERM CURRENT USE OF INSULIN (HCC): ICD-10-CM

## 2019-09-18 DIAGNOSIS — K22.70 BARRETT'S ESOPHAGUS WITHOUT DYSPLASIA: ICD-10-CM

## 2019-09-18 LAB
CREATININE URINE: 156.6 MG/DL (ref 28–217)
FOLATE: 9.3 NG/ML
MICROALBUMIN/CREAT 24H UR: 21 MG/L
MICROALBUMIN/CREAT UR-RTO: 13 MCG/MG CREAT
VITAMIN B-12: >2000 PG/ML (ref 232–1245)

## 2019-09-18 PROCEDURE — 99214 OFFICE O/P EST MOD 30 MIN: CPT | Performed by: INTERNAL MEDICINE

## 2019-09-18 RX ORDER — TIZANIDINE 2 MG/1
2 TABLET ORAL EVERY 8 HOURS PRN
Qty: 90 TABLET | Refills: 1 | Status: SHIPPED | OUTPATIENT
Start: 2019-09-18 | End: 2019-12-14 | Stop reason: SDUPTHER

## 2019-09-19 LAB
ESTIMATED AVERAGE GLUCOSE: 117 MG/DL
HBA1C MFR BLD: 5.7 % (ref 4–6)

## 2019-10-02 ASSESSMENT — ENCOUNTER SYMPTOMS
DIARRHEA: 0
EYE ITCHING: 0
CHEST TIGHTNESS: 0
EYE REDNESS: 0
ABDOMINAL PAIN: 0
BLOOD IN STOOL: 0
CONSTIPATION: 0
EYE DISCHARGE: 0
CHOKING: 0
ABDOMINAL DISTENTION: 0
SHORTNESS OF BREATH: 0
COLOR CHANGE: 0
APNEA: 0
EYE PAIN: 0
COUGH: 0
BACK PAIN: 1

## 2019-10-16 ENCOUNTER — OFFICE VISIT (OUTPATIENT)
Dept: INTERNAL MEDICINE CLINIC | Age: 64
End: 2019-10-16
Payer: MEDICARE

## 2019-10-16 VITALS
OXYGEN SATURATION: 98 % | HEIGHT: 69 IN | BODY MASS INDEX: 35.55 KG/M2 | WEIGHT: 240 LBS | HEART RATE: 60 BPM | SYSTOLIC BLOOD PRESSURE: 132 MMHG | DIASTOLIC BLOOD PRESSURE: 60 MMHG

## 2019-10-16 DIAGNOSIS — Z98.61 CAD S/P PERCUTANEOUS CORONARY ANGIOPLASTY: ICD-10-CM

## 2019-10-16 DIAGNOSIS — E66.01 MORBID OBESITY DUE TO EXCESS CALORIES (HCC): ICD-10-CM

## 2019-10-16 DIAGNOSIS — M54.9 CHRONIC BACK PAIN, UNSPECIFIED BACK LOCATION, UNSPECIFIED BACK PAIN LATERALITY: ICD-10-CM

## 2019-10-16 DIAGNOSIS — G89.29 CHRONIC RIGHT SHOULDER PAIN: Primary | ICD-10-CM

## 2019-10-16 DIAGNOSIS — M25.511 CHRONIC RIGHT SHOULDER PAIN: Primary | ICD-10-CM

## 2019-10-16 DIAGNOSIS — G89.29 CHRONIC BACK PAIN, UNSPECIFIED BACK LOCATION, UNSPECIFIED BACK PAIN LATERALITY: ICD-10-CM

## 2019-10-16 DIAGNOSIS — I25.10 CAD S/P PERCUTANEOUS CORONARY ANGIOPLASTY: ICD-10-CM

## 2019-10-16 DIAGNOSIS — Z23 NEED FOR INFLUENZA VACCINATION: ICD-10-CM

## 2019-10-16 DIAGNOSIS — E11.9 TYPE 2 DIABETES MELLITUS WITHOUT COMPLICATION, WITHOUT LONG-TERM CURRENT USE OF INSULIN (HCC): ICD-10-CM

## 2019-10-16 PROCEDURE — 90686 IIV4 VACC NO PRSV 0.5 ML IM: CPT | Performed by: INTERNAL MEDICINE

## 2019-10-16 PROCEDURE — G0008 ADMIN INFLUENZA VIRUS VAC: HCPCS | Performed by: INTERNAL MEDICINE

## 2019-10-16 PROCEDURE — 99214 OFFICE O/P EST MOD 30 MIN: CPT | Performed by: INTERNAL MEDICINE

## 2019-10-16 ASSESSMENT — ENCOUNTER SYMPTOMS
ABDOMINAL PAIN: 0
EYE ITCHING: 0
BACK PAIN: 0
CONSTIPATION: 0
SHORTNESS OF BREATH: 0
BLOOD IN STOOL: 0
COUGH: 0
CHOKING: 0
ABDOMINAL DISTENTION: 0
EYE PAIN: 0
COLOR CHANGE: 0
DIARRHEA: 0
APNEA: 0
EYE DISCHARGE: 0
CHEST TIGHTNESS: 0
EYE REDNESS: 0

## 2019-10-30 ENCOUNTER — OFFICE VISIT (OUTPATIENT)
Dept: INTERNAL MEDICINE CLINIC | Age: 64
End: 2019-10-30
Payer: MEDICARE

## 2019-10-30 VITALS
DIASTOLIC BLOOD PRESSURE: 80 MMHG | HEIGHT: 68 IN | WEIGHT: 236 LBS | SYSTOLIC BLOOD PRESSURE: 138 MMHG | BODY MASS INDEX: 35.77 KG/M2

## 2019-10-30 DIAGNOSIS — Z00.00 ROUTINE GENERAL MEDICAL EXAMINATION AT A HEALTH CARE FACILITY: Primary | ICD-10-CM

## 2019-10-30 DIAGNOSIS — E11.42 TYPE 2 DIABETES MELLITUS WITH DIABETIC POLYNEUROPATHY, WITHOUT LONG-TERM CURRENT USE OF INSULIN (HCC): ICD-10-CM

## 2019-10-30 PROCEDURE — G0438 PPPS, INITIAL VISIT: HCPCS | Performed by: NURSE PRACTITIONER

## 2019-10-30 RX ORDER — TIZANIDINE 2 MG/1
2 TABLET ORAL 3 TIMES DAILY PRN
COMMUNITY
End: 2020-02-13

## 2019-10-30 RX ORDER — FLUTICASONE PROPIONATE 50 MCG
1 SPRAY, SUSPENSION (ML) NASAL DAILY PRN
COMMUNITY

## 2019-10-30 SDOH — HEALTH STABILITY: MENTAL HEALTH: HOW MANY STANDARD DRINKS CONTAINING ALCOHOL DO YOU HAVE ON A TYPICAL DAY?: 1 OR 2

## 2019-10-30 SDOH — HEALTH STABILITY: MENTAL HEALTH: HOW OFTEN DO YOU HAVE A DRINK CONTAINING ALCOHOL?: 2-4 TIMES A MONTH

## 2019-10-30 ASSESSMENT — LIFESTYLE VARIABLES
AUDIT TOTAL SCORE: 2
HOW OFTEN DURING THE LAST YEAR HAVE YOU FAILED TO DO WHAT WAS NORMALLY EXPECTED FROM YOU BECAUSE OF DRINKING: 0
HOW OFTEN DO YOU HAVE SIX OR MORE DRINKS ON ONE OCCASION: 0
HOW OFTEN DURING THE LAST YEAR HAVE YOU NEEDED AN ALCOHOLIC DRINK FIRST THING IN THE MORNING TO GET YOURSELF GOING AFTER A NIGHT OF HEAVY DRINKING: 0
HAS A RELATIVE, FRIEND, DOCTOR, OR ANOTHER HEALTH PROFESSIONAL EXPRESSED CONCERN ABOUT YOUR DRINKING OR SUGGESTED YOU CUT DOWN: 0
HOW OFTEN DURING THE LAST YEAR HAVE YOU FOUND THAT YOU WERE NOT ABLE TO STOP DRINKING ONCE YOU HAD STARTED: 0
HOW OFTEN DURING THE LAST YEAR HAVE YOU HAD A FEELING OF GUILT OR REMORSE AFTER DRINKING: 0
HOW OFTEN DURING THE LAST YEAR HAVE YOU BEEN UNABLE TO REMEMBER WHAT HAPPENED THE NIGHT BEFORE BECAUSE YOU HAD BEEN DRINKING: 0
HAVE YOU OR SOMEONE ELSE BEEN INJURED AS A RESULT OF YOUR DRINKING: 0
HOW MANY STANDARD DRINKS CONTAINING ALCOHOL DO YOU HAVE ON A TYPICAL DAY: 0
HOW OFTEN DO YOU HAVE A DRINK CONTAINING ALCOHOL: 2
AUDIT-C TOTAL SCORE: 2

## 2019-10-30 ASSESSMENT — PATIENT HEALTH QUESTIONNAIRE - PHQ9
SUM OF ALL RESPONSES TO PHQ QUESTIONS 1-9: 0
SUM OF ALL RESPONSES TO PHQ QUESTIONS 1-9: 0

## 2019-11-07 ENCOUNTER — HOSPITAL ENCOUNTER (OUTPATIENT)
Dept: PAIN MANAGEMENT | Age: 64
Discharge: HOME | End: 2019-11-07
Payer: MEDICARE

## 2019-11-07 VITALS
DIASTOLIC BLOOD PRESSURE: 74 MMHG | TEMPERATURE: 98.7 F | WEIGHT: 236 LBS | BODY MASS INDEX: 37.04 KG/M2 | HEART RATE: 56 BPM | SYSTOLIC BLOOD PRESSURE: 145 MMHG | HEIGHT: 67 IN | RESPIRATION RATE: 16 BRPM | OXYGEN SATURATION: 99 %

## 2019-11-07 DIAGNOSIS — M79.18 MYOFASCIAL PAIN: ICD-10-CM

## 2019-11-07 DIAGNOSIS — M51.36 DDD (DEGENERATIVE DISC DISEASE), LUMBAR: ICD-10-CM

## 2019-11-07 DIAGNOSIS — Z98.890 S/P LUMBAR LAMINECTOMY: ICD-10-CM

## 2019-11-07 DIAGNOSIS — M54.16 LUMBAR RADICULOPATHY: Primary | ICD-10-CM

## 2019-11-07 PROCEDURE — 99203 OFFICE O/P NEW LOW 30 MIN: CPT

## 2019-11-07 PROCEDURE — 80307 DRUG TEST PRSMV CHEM ANLYZR: CPT

## 2019-11-07 PROCEDURE — 99204 OFFICE O/P NEW MOD 45 MIN: CPT | Performed by: PAIN MEDICINE

## 2019-11-07 ASSESSMENT — PAIN DESCRIPTION - ORIENTATION: ORIENTATION: RIGHT;LEFT;UPPER

## 2019-11-07 ASSESSMENT — PAIN DESCRIPTION - DESCRIPTORS: DESCRIPTORS: PINS AND NEEDLES

## 2019-11-07 ASSESSMENT — ENCOUNTER SYMPTOMS
EYES NEGATIVE: 1
CONSTIPATION: 1
ALLERGIC/IMMUNOLOGIC NEGATIVE: 1
RESPIRATORY NEGATIVE: 1
PHOTOPHOBIA: 0
SINUS PAIN: 0
SHORTNESS OF BREATH: 0
BACK PAIN: 1
BLOOD IN STOOL: 0

## 2019-11-07 ASSESSMENT — PAIN DESCRIPTION - ONSET: ONSET: ON-GOING

## 2019-11-07 ASSESSMENT — PAIN DESCRIPTION - FREQUENCY: FREQUENCY: CONTINUOUS

## 2019-11-07 ASSESSMENT — PAIN DESCRIPTION - PAIN TYPE: TYPE: CHRONIC PAIN

## 2019-11-07 ASSESSMENT — PAIN SCALES - GENERAL: PAINLEVEL_OUTOF10: 5

## 2019-11-07 ASSESSMENT — PAIN DESCRIPTION - PROGRESSION: CLINICAL_PROGRESSION: GRADUALLY WORSENING

## 2019-11-07 ASSESSMENT — PAIN DESCRIPTION - LOCATION: LOCATION: BACK

## 2019-11-10 LAB
6-ACETYLMORPHINE, UR: NOT DETECTED
7-AMINOCLONAZEPAM, URINE: NOT DETECTED
ALPHA-OH-ALPRAZ, URINE: NOT DETECTED
ALPRAZOLAM, URINE: NOT DETECTED
AMPHETAMINES, URINE: NOT DETECTED
BARBITURATES, URINE: NOT DETECTED
BENZOYLECGONINE, UR: NOT DETECTED
BUPRENORPHINE URINE: NOT DETECTED
CARISOPRODOL, UR: NOT DETECTED
CLONAZEPAM, URINE: NOT DETECTED
CODEINE, URINE: NOT DETECTED
CREATININE URINE: 99.5 MG/DL (ref 20–400)
DIAZEPAM, URINE: NOT DETECTED
DRUGS EXPECTED, UR: NORMAL
EER HI RES INTERP UR: NORMAL
ETHYL GLUCURONIDE UR: NOT DETECTED
FENTANYL URINE: NOT DETECTED
HYDROCODONE, URINE: PRESENT
HYDROMORPHONE, URINE: NOT DETECTED
LORAZEPAM, URINE: NOT DETECTED
MARIJUANA METAB, UR: NOT DETECTED
MDA, UR: NOT DETECTED
MDEA, EVE, UR: NOT DETECTED
MDMA URINE: NOT DETECTED
MEPERIDINE METAB, UR: NOT DETECTED
METHADONE, URINE: NOT DETECTED
METHAMPHETAMINE, URINE: NOT DETECTED
METHYLPHENIDATE: NOT DETECTED
MIDAZOLAM, URINE: NOT DETECTED
MORPHINE URINE: NOT DETECTED
NORBUPRENORPHINE, URINE: NOT DETECTED
NORDIAZEPAM, URINE: NOT DETECTED
NORFENTANYL, URINE: NOT DETECTED
NORHYDROCODONE, URINE: NOT DETECTED
NOROXYCODONE, URINE: NOT DETECTED
NOROXYMORPHONE, URINE: NOT DETECTED
OXAZEPAM, URINE: NOT DETECTED
OXYCODONE URINE: NOT DETECTED
OXYMORPHONE, URINE: NOT DETECTED
PAIN MANAGEMENT DRUG PANEL INTERP, URINE: NORMAL
PAIN MGT DRUG PANEL, HI RES, UR: NORMAL
PCP,URINE: NOT DETECTED
PHENTERMINE, UR: NOT DETECTED
PROPOXYPHENE, URINE: NOT DETECTED
TAPENTADOL, URINE: NOT DETECTED
TAPENTADOL-O-SULFATE, URINE: NOT DETECTED
TEMAZEPAM, URINE: NOT DETECTED
TRAMADOL, URINE: NOT DETECTED
ZOLPIDEM, URINE: NOT DETECTED

## 2019-12-02 ENCOUNTER — TELEPHONE (OUTPATIENT)
Dept: GASTROENTEROLOGY | Age: 64
End: 2019-12-02

## 2019-12-03 ENCOUNTER — OFFICE VISIT (OUTPATIENT)
Dept: GASTROENTEROLOGY | Age: 64
End: 2019-12-03
Payer: MEDICARE

## 2019-12-03 VITALS
HEART RATE: 63 BPM | SYSTOLIC BLOOD PRESSURE: 139 MMHG | DIASTOLIC BLOOD PRESSURE: 75 MMHG | WEIGHT: 238.5 LBS | BODY MASS INDEX: 37.35 KG/M2

## 2019-12-03 DIAGNOSIS — E66.01 MORBID OBESITY (HCC): ICD-10-CM

## 2019-12-03 DIAGNOSIS — K58.2 IRRITABLE BOWEL SYNDROME WITH BOTH CONSTIPATION AND DIARRHEA: Primary | ICD-10-CM

## 2019-12-03 DIAGNOSIS — K21.9 GASTROESOPHAGEAL REFLUX DISEASE, ESOPHAGITIS PRESENCE NOT SPECIFIED: ICD-10-CM

## 2019-12-03 DIAGNOSIS — K22.70 BARRETT'S ESOPHAGUS WITHOUT DYSPLASIA: ICD-10-CM

## 2019-12-03 PROCEDURE — 99214 OFFICE O/P EST MOD 30 MIN: CPT | Performed by: INTERNAL MEDICINE

## 2019-12-03 ASSESSMENT — ENCOUNTER SYMPTOMS
DIARRHEA: 1
SORE THROAT: 0
BLOOD IN STOOL: 0
ANAL BLEEDING: 0
RECTAL PAIN: 0
VOMITING: 0
ABDOMINAL PAIN: 1
VOICE CHANGE: 0
NAUSEA: 0
BACK PAIN: 1
ABDOMINAL DISTENTION: 1
TROUBLE SWALLOWING: 1
CONSTIPATION: 1
COUGH: 0
CHOKING: 0
RESPIRATORY NEGATIVE: 1
WHEEZING: 0
ALLERGIC/IMMUNOLOGIC NEGATIVE: 1
SINUS PRESSURE: 1

## 2019-12-14 DIAGNOSIS — M54.9 CHRONIC BACK PAIN, UNSPECIFIED BACK LOCATION, UNSPECIFIED BACK PAIN LATERALITY: ICD-10-CM

## 2019-12-14 DIAGNOSIS — G89.29 CHRONIC BACK PAIN, UNSPECIFIED BACK LOCATION, UNSPECIFIED BACK PAIN LATERALITY: ICD-10-CM

## 2019-12-16 RX ORDER — TIZANIDINE 2 MG/1
TABLET ORAL
Qty: 90 TABLET | Refills: 0 | Status: SHIPPED | OUTPATIENT
Start: 2019-12-16 | End: 2020-01-15

## 2020-01-15 RX ORDER — TIZANIDINE 2 MG/1
TABLET ORAL
Qty: 90 TABLET | Refills: 0 | Status: SHIPPED | OUTPATIENT
Start: 2020-01-15 | End: 2020-01-30

## 2020-01-30 ENCOUNTER — HOSPITAL ENCOUNTER (OUTPATIENT)
Age: 65
Discharge: HOME | End: 2020-01-30
Payer: MEDICARE

## 2020-01-30 PROBLEM — R42 DIZZINESS: Status: ACTIVE | Noted: 2018-06-17

## 2020-01-30 PROBLEM — I25.10 ATHEROSCLEROSIS OF NATIVE CORONARY ARTERY OF NATIVE HEART WITHOUT ANGINA PECTORIS: Status: ACTIVE | Noted: 2017-02-06

## 2020-01-30 PROBLEM — M54.50 CHRONIC LOW BACK PAIN: Status: ACTIVE | Noted: 2017-03-13

## 2020-01-30 PROBLEM — G56.01 CARPAL TUNNEL SYNDROME ON RIGHT: Status: ACTIVE | Noted: 2017-05-25

## 2020-01-30 PROBLEM — E87.5 HYPERKALEMIA: Status: ACTIVE | Noted: 2018-07-04

## 2020-01-30 PROBLEM — D50.0 IRON DEFICIENCY ANEMIA DUE TO CHRONIC BLOOD LOSS: Status: ACTIVE | Noted: 2018-07-03

## 2020-01-30 PROBLEM — E11.9 DIABETES (HCC): Status: ACTIVE | Noted: 2017-02-06

## 2020-01-30 PROBLEM — M17.11 PRIMARY OSTEOARTHRITIS OF RIGHT KNEE: Status: ACTIVE | Noted: 2018-10-05

## 2020-01-30 PROBLEM — G89.29 CHRONIC LOW BACK PAIN: Status: ACTIVE | Noted: 2017-03-13

## 2020-01-30 PROBLEM — E78.5 DYSLIPIDEMIA: Status: ACTIVE | Noted: 2019-06-25

## 2020-01-30 PROBLEM — M17.0 PRIMARY OSTEOARTHRITIS OF BOTH KNEES: Status: ACTIVE | Noted: 2017-03-13

## 2020-01-30 PROBLEM — Z01.810 ENCOUNTER FOR PREPROCEDURAL CARDIOVASCULAR EXAMINATION: Status: ACTIVE | Noted: 2017-05-23

## 2020-01-30 PROBLEM — R04.0 EPISTAXIS: Status: ACTIVE | Noted: 2018-06-18

## 2020-01-30 PROBLEM — F33.1 MODERATE EPISODE OF RECURRENT MAJOR DEPRESSIVE DISORDER (HCC): Status: ACTIVE | Noted: 2019-05-04

## 2020-01-30 LAB
ABSOLUTE EOS #: 0.3 K/UL (ref 0–0.4)
ABSOLUTE IMMATURE GRANULOCYTE: NORMAL K/UL (ref 0–0.3)
ABSOLUTE LYMPH #: 2.2 K/UL (ref 1–4.8)
ABSOLUTE MONO #: 0.5 K/UL (ref 0.1–1.3)
ALBUMIN SERPL-MCNC: 4.3 G/DL (ref 3.5–5.2)
ALBUMIN/GLOBULIN RATIO: ABNORMAL (ref 1–2.5)
ALP BLD-CCNC: 73 U/L (ref 35–104)
ALT SERPL-CCNC: 24 U/L (ref 5–33)
ANION GAP SERPL CALCULATED.3IONS-SCNC: 12 MMOL/L (ref 9–17)
AST SERPL-CCNC: 27 U/L
BASOPHILS # BLD: 1 % (ref 0–2)
BASOPHILS ABSOLUTE: 0.1 K/UL (ref 0–0.2)
BILIRUB SERPL-MCNC: 0.69 MG/DL (ref 0.3–1.2)
BUN BLDV-MCNC: 26 MG/DL (ref 8–23)
BUN/CREAT BLD: ABNORMAL (ref 9–20)
CALCIUM SERPL-MCNC: 10.3 MG/DL (ref 8.6–10.4)
CHLORIDE BLD-SCNC: 98 MMOL/L (ref 98–107)
CO2: 27 MMOL/L (ref 20–31)
CREAT SERPL-MCNC: 1.3 MG/DL (ref 0.5–0.9)
DIFFERENTIAL TYPE: NORMAL
EOSINOPHILS RELATIVE PERCENT: 3 % (ref 0–4)
GFR AFRICAN AMERICAN: 50 ML/MIN
GFR NON-AFRICAN AMERICAN: 41 ML/MIN
GFR SERPL CREATININE-BSD FRML MDRD: ABNORMAL ML/MIN/{1.73_M2}
GFR SERPL CREATININE-BSD FRML MDRD: ABNORMAL ML/MIN/{1.73_M2}
GLUCOSE BLD-MCNC: 96 MG/DL (ref 70–99)
HCT VFR BLD CALC: 44 % (ref 36–46)
HEMOGLOBIN: 14.6 G/DL (ref 12–16)
IMMATURE GRANULOCYTES: NORMAL %
LYMPHOCYTES # BLD: 29 % (ref 24–44)
MAGNESIUM: 1.9 MG/DL (ref 1.6–2.6)
MCH RBC QN AUTO: 29.8 PG (ref 26–34)
MCHC RBC AUTO-ENTMCNC: 33.2 G/DL (ref 31–37)
MCV RBC AUTO: 89.8 FL (ref 80–100)
MONOCYTES # BLD: 7 % (ref 1–7)
NRBC AUTOMATED: NORMAL PER 100 WBC
PDW BLD-RTO: 14.7 % (ref 11.5–14.9)
PHOSPHORUS: 3.3 MG/DL (ref 2.6–4.5)
PLATELET # BLD: 201 K/UL (ref 150–450)
PLATELET ESTIMATE: NORMAL
PMV BLD AUTO: 8.8 FL (ref 6–12)
POTASSIUM SERPL-SCNC: 4.5 MMOL/L (ref 3.7–5.3)
RBC # BLD: 4.9 M/UL (ref 4–5.2)
RBC # BLD: NORMAL 10*6/UL
SEG NEUTROPHILS: 60 % (ref 36–66)
SEGMENTED NEUTROPHILS ABSOLUTE COUNT: 4.5 K/UL (ref 1.3–9.1)
SODIUM BLD-SCNC: 137 MMOL/L (ref 135–144)
TOTAL PROTEIN: 7.4 G/DL (ref 6.4–8.3)
WBC # BLD: 7.5 K/UL (ref 3.5–11)
WBC # BLD: NORMAL 10*3/UL

## 2020-01-30 PROCEDURE — 83735 ASSAY OF MAGNESIUM: CPT

## 2020-01-30 PROCEDURE — 80053 COMPREHEN METABOLIC PANEL: CPT

## 2020-01-30 PROCEDURE — 85025 COMPLETE CBC W/AUTO DIFF WBC: CPT

## 2020-01-30 PROCEDURE — 84100 ASSAY OF PHOSPHORUS: CPT

## 2020-01-30 PROCEDURE — 36415 COLL VENOUS BLD VENIPUNCTURE: CPT

## 2020-02-13 RX ORDER — TIZANIDINE 2 MG/1
TABLET ORAL
Qty: 90 TABLET | Refills: 0 | Status: SHIPPED | OUTPATIENT
Start: 2020-02-13 | End: 2020-03-24

## 2020-02-29 PROBLEM — Z01.810 ENCOUNTER FOR PREPROCEDURAL CARDIOVASCULAR EXAMINATION: Status: RESOLVED | Noted: 2017-05-23 | Resolved: 2020-02-29

## 2020-03-24 RX ORDER — TIZANIDINE 2 MG/1
TABLET ORAL
Qty: 90 TABLET | Refills: 0 | Status: SHIPPED | OUTPATIENT
Start: 2020-03-24 | End: 2020-05-05

## 2020-03-25 PROBLEM — Z98.890 STATUS POST D&C: Status: RESOLVED | Noted: 2018-01-10 | Resolved: 2020-03-24

## 2020-04-01 ENCOUNTER — TELEPHONE (OUTPATIENT)
Dept: GASTROENTEROLOGY | Age: 65
End: 2020-04-01

## 2020-04-23 NOTE — PROGRESS NOTES
Pt diagnosed with Covid-19 positive. Noted poor appetite. Will send ONS Ensure Enlive bid for added protein and cals. Encourage good oral intake of meals and supplements as tolerated. RD available by consult for full assessment. Will continue to monitor per policy.   Tylor Kapadia  6/11/2019      Tylor Kapadia is a 61 y.o. female [id number]      The patient was seen today. She was here to follow-up regarding her labs and diagnostics ordered at her last visit for the diagnosis of:    ICD-10-CM    1. PMB (postmenopausal bleeding) N95.0    2. History of D&C Hysteroscopy 1/10/2018 Z98.890    3. Endometrial thickening on ultrasound R93.89    4. Uterine hyperplasia Simple and complex WITHOUT ATYPIA (7/17/2017) N85.2    5. Essential hypertension I10    6. History of myocardial infarction I25.2    7. CAD S/P percutaneous coronary angioplasty I25.10     Z98.61    8. Type 2 diabetes mellitus without complication, without long-term current use of insulin ([facility]) E11.9        She does not have any specific chief complaint today. Her bowels are regular and she is voiding without difficulty. Pt states her endocrinologist started her on Prempro 0.625/2.5 8 months ago and her PMB began shortly after that, 4-6 weeks. Pt states she missed over 1 week of pills 3/2019 and had bleeding in 5/2019      Past Medical History:   Diagnosis Date    Anemia     Arthritis     back    Guerrero esophagus 11/01/2018    Bruises easily     CAD S/P percutaneous coronary angioplasty 6/8/2017    cardiac stent x1 5-29-09    Chronic back pain, KNEE PAIN AND NECK PAIN.  CKD (chronic kidney disease) stage 3, GFR 30-59 ml/min ([facility])     Colon polyp 04/16/2019    hyperplastic polyp    DDD (degenerative disc disease)     DDD (degenerative disc disease)     Diverticulitis     Fibromyalgia     Full dentures     H/O dilation and curettage 07/17/2017    History of myocardial infarction 5/09    Hx of blood clots     left kidney    Hypercholesterolemia     Hypertension     Neuropathy     Obesity     Post-menopausal bleeding     Short of breath on exertion     Spinal stenosis     Tobacco abuse     'quit 3 yrs ago. \"    Type 2 diabetes mellitus without complication, without long-term current use of insulin ([address] 2017    Wears glasses          Past Surgical History:   Procedure Laterality Date    BREAST BIOPSY  2005    Left---Benign    CARDIAC CATHETERIZATION  2016    no intervention    COLONOSCOPY      diverticulitis    COLONOSCOPY N/A 2019    hyperplastic polyp    CORONARY ANGIOPLASTY WITH STENT PLACEMENT      DILATION AND CURETTAGE OF UTERUS N/A 2017    DILATATION AND CURETTAGE OPERATIVE HYSTEROSCOPY WITH Julian Mueller, POLYPECTOMY, MYOMECTOMY performed by Dearcaryl Schaffer, DO at [address] HYSTEROSCOPY  13    Operative Hscope with endometrial sampling, polypectomy & myomectomy [facility]    HYSTEROSCOPY  2017    NC OFFICE/OUTPT VISIT,PROCEDURE ONLY N/A 1/10/2018    DILATATION AND CURETTAGE HYSTEROSCOPY performed by Media Temple Sarbjit, DO at [place]  age 22   [address] Left 2017    TOTAL KNEE ARTHROPLASTY Right 2018    TUBAL LIGATION  1979    UPPER GASTROINTESTINAL ENDOSCOPY  2018    REESE'S         Family History   Problem Relation Age of Onset    Heart Attack Father     Diabetes Mother     Other Mother         brain bleed    Other Daughter         blood clotting disorder    Breast Cancer Neg Hx     Cancer Neg Hx     Colon Cancer Neg Hx     Eclampsia Neg Hx     Hypertension Neg Hx     Ovarian Cancer Neg Hx      Labor Neg Hx     Spont Abortions Neg Hx     Stroke Neg Hx     Heart Disease Neg Hx          Social History     Tobacco Use    Smoking status: Former Smoker     Packs/day: 0.50     Years: 40.00     Pack years: 20.00     Last attempt to quit: 2012     Years since quittin.3    Smokeless tobacco: Never Used   Substance Use Topics    Alcohol use: Yes     Comment: Occassional    Drug use: No         MEDICATIONS:  Current Outpatient Medications   Medication Sig Dispense Refill    albuterol sulfate HFA (PROVENTIL HFA) 108 (90 Base) MCG/ACT inhaler Inhale 2 puffs into transvaginal        Lab Results:  Results for orders placed or performed during the hospital encounter of 05/22/19   T3, Free   Result Value Ref Range    T3, Free 2.96 2.02 - 4.43 pg/mL   T4, Free   Result Value Ref Range    Thyroxine, Free 1.35 0.93 - 1.70 ng/dL   TSH without Reflex   Result Value Ref Range    TSH 2.23 0.30 - 5.00 mIU/L           Assessment:   Diagnosis Orders   1. PMB (postmenopausal bleeding)     2. History of D&C Hysteroscopy 1/10/2018     3. Endometrial thickening on ultrasound     4. Uterine hyperplasia Simple and complex WITHOUT ATYPIA (7/17/2017)     5. Essential hypertension     6. History of myocardial infarction     7. CAD S/P percutaneous coronary angioplasty     8. Type 2 diabetes mellitus without complication, without long-term current use of insulin [facility])       Chief Complaint   Patient presents with    Follow-up         Patient Active Problem List    Diagnosis Date Noted    Uterine hyperplasia Simple and complex WITHOUT ATYPIA (7/17/2017) 09/19/2017     Priority: High     Discussed progesterone therapy and progression risk to Uterine cancer. Pt declined and medical intervention or hysterectomy due to medical co-morbidities. Plan repeat sampling of uterus in 6-9 months to re-evaluate.       History of total left knee replacement 09/19/2017     Priority: High    PMB (postmenopausal bleeding) 05/16/2013     Priority: High    History of myocardial infarction      Priority: High     STENTS Prophylaxis if SURGERY      Hypertension      Priority: High    Tobacco abuse      Priority: High    History of D&C Hysteroscopy 1/10/2018 01/30/2018     Priority: Medium     Pathology without Hyperplasia or dysplasia      Status post D&C Hysteroscopy 1/10/2018 01/10/2018     Priority: Medium    7/17/17 Hysto, D/C, Myosure  07/17/2017     Priority: Medium    S/P tubal ligation 05/16/2013     Priority: Medium    Hypercholesterolemia      Priority: Medium    Diverticulitis      Priority: Medium    Obesity      Priority: Medium    Osteopenia 05/09/2018     Priority: Low    Arthritis      Priority: Low    Bruises easily      Priority: Low    Colon polyp 04/16/2019     hyperplastic polyp      IBS (irritable bowel syndrome) 02/19/2019    Guerrero esophagus 11/01/2018    Anemia 10/04/2018    Constipation 10/04/2018    Fibroid uterus 07/17/2017    H/O dilation and curettage 07/17/2017    CAD S/P percutaneous coronary angioplasty 06/08/2017    Type 2 diabetes mellitus without complication, without long-term current use of insulin ([address] 06/08/2017    CKD (chronic kidney disease) stage 3, GFR 30-59 ml/min ([address] 06/08/2017    Diabetic polyneuropathy associated with type 2 diabetes mellitus ([address] 06/08/2017    Morbid obesity due to excess calories ([address] 06/08/2017    SOB (shortness of breath) 06/20/2016    DDD (degenerative disc disease)     Neuropathy        PLAN:  Return in about 3 weeks (around 7/2/2019) for Surgical Boarding. STOP PREMPRO ASAP-REVIEWED HIGH RISK FOR THROMBOEMBOLISM  Return to the office in 2-3 weeks. SX board D&C Hscope with Surgical Clearance  Counseled on preventative health maintenance follow-up. No orders of the defined types were placed in this encounter. Patient was seen with total face to face time of 15 minutes. More than 50% of this visit was counseling and education regarding The primary encounter diagnosis was PMB (postmenopausal bleeding). Diagnoses of History of D&C Hysteroscopy 1/10/2018, Endometrial thickening on ultrasound, Uterine hyperplasia Simple and complex WITHOUT ATYPIA (7/17/2017), Essential hypertension, History of myocardial infarction, CAD S/P percutaneous coronary angioplasty, and Type 2 diabetes mellitus without complication, without long-term current use of insulin ([address] were also pertinent to this visit. and Follow-up   as well as  counseling on preventative health maintenance follow-up.

## 2020-05-05 RX ORDER — TIZANIDINE 2 MG/1
TABLET ORAL
Qty: 90 TABLET | Refills: 0 | Status: SHIPPED | OUTPATIENT
Start: 2020-05-05 | End: 2020-06-05

## 2020-06-01 ENCOUNTER — TELEPHONE (OUTPATIENT)
Dept: GASTROENTEROLOGY | Age: 65
End: 2020-06-01

## 2020-06-02 ENCOUNTER — OFFICE VISIT (OUTPATIENT)
Dept: GASTROENTEROLOGY | Age: 65
End: 2020-06-02
Payer: MEDICARE

## 2020-06-02 ENCOUNTER — HOSPITAL ENCOUNTER (OUTPATIENT)
Age: 65
Discharge: HOME | End: 2020-06-02
Payer: MEDICARE

## 2020-06-02 VITALS — WEIGHT: 238.4 LBS | BODY MASS INDEX: 37.34 KG/M2

## 2020-06-02 PROBLEM — F11.90 CHRONIC NARCOTIC USE: Status: ACTIVE | Noted: 2020-06-02

## 2020-06-02 PROBLEM — K21.9 GASTROESOPHAGEAL REFLUX DISEASE: Status: ACTIVE | Noted: 2020-06-02

## 2020-06-02 LAB
ABSOLUTE EOS #: 0.2 K/UL (ref 0–0.4)
ABSOLUTE IMMATURE GRANULOCYTE: ABNORMAL K/UL (ref 0–0.3)
ABSOLUTE LYMPH #: 1.6 K/UL (ref 1–4.8)
ABSOLUTE MONO #: 0.5 K/UL (ref 0.1–1.3)
ALBUMIN SERPL-MCNC: 4 G/DL (ref 3.5–5.2)
ALBUMIN/GLOBULIN RATIO: ABNORMAL (ref 1–2.5)
ALP BLD-CCNC: 73 U/L (ref 35–104)
ALT SERPL-CCNC: 19 U/L (ref 5–33)
ANION GAP SERPL CALCULATED.3IONS-SCNC: 12 MMOL/L (ref 9–17)
AST SERPL-CCNC: 26 U/L
BASOPHILS # BLD: 1 % (ref 0–2)
BASOPHILS ABSOLUTE: 0.1 K/UL (ref 0–0.2)
BILIRUB SERPL-MCNC: 0.48 MG/DL (ref 0.3–1.2)
BUN BLDV-MCNC: 19 MG/DL (ref 8–23)
BUN/CREAT BLD: ABNORMAL (ref 9–20)
CALCIUM SERPL-MCNC: 9.9 MG/DL (ref 8.6–10.4)
CHLORIDE BLD-SCNC: 104 MMOL/L (ref 98–107)
CO2: 24 MMOL/L (ref 20–31)
CREAT SERPL-MCNC: 1.26 MG/DL (ref 0.5–0.9)
DIFFERENTIAL TYPE: ABNORMAL
EOSINOPHILS RELATIVE PERCENT: 3 % (ref 0–4)
GFR AFRICAN AMERICAN: 52 ML/MIN
GFR NON-AFRICAN AMERICAN: 43 ML/MIN
GFR SERPL CREATININE-BSD FRML MDRD: ABNORMAL ML/MIN/{1.73_M2}
GFR SERPL CREATININE-BSD FRML MDRD: ABNORMAL ML/MIN/{1.73_M2}
GLUCOSE BLD-MCNC: 126 MG/DL (ref 70–99)
HCT VFR BLD CALC: 43.3 % (ref 36–46)
HEMOGLOBIN: 14.4 G/DL (ref 12–16)
IMMATURE GRANULOCYTES: ABNORMAL %
LYMPHOCYTES # BLD: 22 % (ref 24–44)
MAGNESIUM: 1.8 MG/DL (ref 1.6–2.6)
MCH RBC QN AUTO: 29.5 PG (ref 26–34)
MCHC RBC AUTO-ENTMCNC: 33.2 G/DL (ref 31–37)
MCV RBC AUTO: 89 FL (ref 80–100)
MONOCYTES # BLD: 7 % (ref 1–7)
NRBC AUTOMATED: ABNORMAL PER 100 WBC
PDW BLD-RTO: 14.8 % (ref 11.5–14.9)
PHOSPHORUS: 3.9 MG/DL (ref 2.6–4.5)
PLATELET # BLD: 238 K/UL (ref 150–450)
PLATELET ESTIMATE: ABNORMAL
PMV BLD AUTO: 8.8 FL (ref 6–12)
POTASSIUM SERPL-SCNC: 4.8 MMOL/L (ref 3.7–5.3)
RBC # BLD: 4.87 M/UL (ref 4–5.2)
RBC # BLD: ABNORMAL 10*6/UL
SEG NEUTROPHILS: 67 % (ref 36–66)
SEGMENTED NEUTROPHILS ABSOLUTE COUNT: 5 K/UL (ref 1.3–9.1)
SODIUM BLD-SCNC: 140 MMOL/L (ref 135–144)
TOTAL PROTEIN: 7 G/DL (ref 6.4–8.3)
WBC # BLD: 7.3 K/UL (ref 3.5–11)
WBC # BLD: ABNORMAL 10*3/UL

## 2020-06-02 PROCEDURE — 85025 COMPLETE CBC W/AUTO DIFF WBC: CPT

## 2020-06-02 PROCEDURE — 84100 ASSAY OF PHOSPHORUS: CPT

## 2020-06-02 PROCEDURE — 83735 ASSAY OF MAGNESIUM: CPT

## 2020-06-02 PROCEDURE — 99214 OFFICE O/P EST MOD 30 MIN: CPT | Performed by: INTERNAL MEDICINE

## 2020-06-02 PROCEDURE — 80053 COMPREHEN METABOLIC PANEL: CPT

## 2020-06-02 PROCEDURE — 36415 COLL VENOUS BLD VENIPUNCTURE: CPT

## 2020-06-02 RX ORDER — LINACLOTIDE 290 UG/1
290 CAPSULE, GELATIN COATED ORAL
Qty: 30 CAPSULE | Refills: 2 | Status: SHIPPED | OUTPATIENT
Start: 2020-06-02 | End: 2020-12-07

## 2020-06-02 RX ORDER — CYANOCOBALAMIN (VITAMIN B-12) 1000 MCG
1 TABLET, EXTENDED RELEASE ORAL 2 TIMES DAILY WITH MEALS
COMMUNITY

## 2020-06-02 RX ORDER — MIRABEGRON 25 MG/1
TABLET, FILM COATED, EXTENDED RELEASE ORAL
COMMUNITY
Start: 2020-04-09 | End: 2021-12-10

## 2020-06-02 ASSESSMENT — ENCOUNTER SYMPTOMS
BACK PAIN: 1
BLOOD IN STOOL: 0
SORE THROAT: 0
DIARRHEA: 0
CHOKING: 0
RECTAL PAIN: 0
ABDOMINAL PAIN: 1
ABDOMINAL DISTENTION: 1
WHEEZING: 0
ALLERGIC/IMMUNOLOGIC NEGATIVE: 1
ANAL BLEEDING: 0
NAUSEA: 0
TROUBLE SWALLOWING: 1
SINUS PRESSURE: 1
VOMITING: 0
COUGH: 0
VOICE CHANGE: 0
RESPIRATORY NEGATIVE: 1
CONSTIPATION: 1

## 2020-06-02 NOTE — PROGRESS NOTES
GI OFFICE FOLLOW UP    Jaiden Garnett is a 59 y.o. female evaluated on 6/2/2020. Consent:  She and/or health care decision maker is aware that that she may receive a bill for this telephone service, depending on her insurance coverage, and has provided verbal consent to proceed: YES      INTERVAL HISTORY:   No referring provider defined for this encounter. Chief Complaint   Patient presents with    Irritable Bowel Syndrome     6 month f/u, constipation with abd pain moving    Gastroesophageal Reflux     doing ok with meds       1. Guerrero's esophagus without dysplasia    2. Irritable bowel syndrome with both constipation and diarrhea    3. Chronic narcotic use    4. Gastroesophageal reflux disease, esophagitis presence not specified    5. Morbid obesity ([address]      This patient is evaluated in my office for above issues  She has been taking some pain medications Norco for her back and  Body aches  She has chronic intermittent constipation  Patient has been complaining of some abdominal pains, off and on cramping  Also complains of abdominal bloating and gas  Has off and on nausea without any sig vomiting  Has some alternating constipation and diarrhea  Has no weight loss  Has some anxiety issues    Has GERD symptoms been taking PPIs    Denies smoking alcohol abuse    No current bleeding or any melanotic stools    Has morbid obesity    HISTORY OF PRESENT ILLNESS: Abraham Aguirre is a 59 y.o. female with a past history remarkable for , referred for evaluation of   Chief Complaint   Patient presents with    Irritable Bowel Syndrome     6 month f/u, constipation with abd pain moving    Gastroesophageal Reflux     doing ok with meds   . Past Medical,Family, and Social History reviewed and does\ contribute to the patient presenting condition.     Patient's PMH/PSH,SH,PSYCH Hx, MEDs, ALLERGIES, and ROS mouth 3 times daily (with meals), Disp: , Rfl:     isosorbide mononitrate (IMDUR) 60 MG extended release tablet, Take 60 mg by mouth daily, Disp: , Rfl:     oxybutynin (DITROPAN) 5 MG tablet, Take 5 mg by mouth 2 times daily, Disp: , Rfl:     clotrimazole-betamethasone (LOTRISONE) 1-0.05 % cream, Apply topically 2 times daily. , Disp: 1 Tube, Rfl: 2    HYDROcodone-acetaminophen (NORCO) 5-325 MG per tablet, Take 1 tablet by mouth every 12 hours as needed for Pain., Disp: , Rfl:     DULoxetine (CYMBALTA) 30 MG extended release capsule, Take 60 mg by mouth 2 times daily , Disp: , Rfl:     nitroGLYCERIN (NITROSTAT) 0.4 MG SL tablet, Place 0.4 mg under the tongue every 5 minutes as needed for Chest pain up to max of 3 total doses. If no relief after 1 dose, call 911. , Disp: , Rfl:     metFORMIN (GLUCOPHAGE) 500 MG tablet, Take 500 mg by mouth 2 times daily (with meals) , Disp: , Rfl:     metoprolol (LOPRESSOR) 50 MG tablet, Take 50 mg by mouth 2 times daily. , Disp: , Rfl:     atorvastatin (LIPITOR) 80 MG tablet, Take 80 mg by mouth daily. , Disp: , Rfl:     aspirin 81 MG EC tablet, Take 81 mg by mouth daily.   , Disp: , Rfl:     ALLERGIES:   Allergies   Allergen Reactions    Pcn [Penicillins] Hives    Amlodipine      Leg swelling    Codeine Other (See Comments)     Stomach cramps    Gabapentin Other (See Comments)     Causes hands shake and unable to hold anything    Lyrica [Pregabalin] Other (See Comments)     \"trouble concentrating\"    Sulfa Antibiotics Other (See Comments)     Cramps    Oxycodone-Acetaminophen Itching and Rash       FAMILY HISTORY:       Problem Relation Age of Onset    Heart Attack Father     Diabetes Mother     Other Mother         brain bleed    Other Daughter         blood clotting disorder    Breast Cancer Neg Hx     Cancer Neg Hx     Colon Cancer Neg Hx     Eclampsia Neg Hx     Hypertension Neg Hx     Ovarian Cancer Neg Hx      Labor Neg Hx     Spont mainly with water). Negative for dental problem, sore throat and voice change. Eyes: Positive for visual disturbance. Respiratory: Negative. Negative for cough, choking and wheezing. Cardiovascular: Positive for chest pain (off and on). Negative for palpitations and leg swelling (off and on). Gastrointestinal: Positive for abdominal distention, abdominal pain (off and on) and constipation. Negative for anal bleeding, blood in stool, diarrhea, nausea, rectal pain and vomiting. Endocrine: Negative. Genitourinary: Negative. Negative for difficulty urinating. Musculoskeletal: Positive for arthralgias and back pain. Negative for gait problem and myalgias. Skin: Negative. Allergic/Immunologic: Negative. Negative for environmental allergies and food allergies. Neurological: Positive for dizziness, weakness and light-headedness. Negative for numbness and headaches. Hematological: Negative. Does not bruise/bleed easily. Psychiatric/Behavioral: Negative. Negative for sleep disturbance. The patient is not nervous/anxious. Reviewed and agree  PHYSICAL EXAMINATION: Vital signs reviewed per the nursing documentation. Wt 238 lb 6.4 oz (108.1 kg)   LMP 06/29/2002 (Within Months)   BMI 37.34 kg/m²   Body mass index is 37.34 kg/m². Physical Exam  Nursing note reviewed. Constitutional:       Appearance: She is well-developed. Comments: Anxious  Obesity    HENT:      Head: Normocephalic and atraumatic. Eyes:      Conjunctiva/sclera: Conjunctivae normal.      Pupils: Pupils are equal, round, and reactive to light. Neck:      Musculoskeletal: Normal range of motion and neck supple. Cardiovascular:      Heart sounds: Normal heart sounds. Pulmonary:      Effort: Pulmonary effort is normal.      Breath sounds: Normal breath sounds. Abdominal:      General: Bowel sounds are normal.      Palpations: Abdomen is soft.       Comments: NON TENDER, NON DISTENTED  LIVER SPLEEN AND HERNIAS ARE NOT  PALPABLE  BOWEL SOUNDS ARE POSITIVE      Musculoskeletal: Normal range of motion. Skin:     General: Skin is warm. Neurological:      Mental Status: She is alert and oriented to person, place, and time. Psychiatric:         Behavior: Behavior normal.           LABORATORY DATA: Reviewed  Lab Results   Component Value Date    WBC 7.5 01/30/2020    HGB 14.6 01/30/2020    HCT 44.0 01/30/2020    MCV 89.8 01/30/2020     01/30/2020     01/30/2020    K 4.5 01/30/2020    CL 98 01/30/2020    CO2 27 01/30/2020    BUN 26 (H) 01/30/2020    CREATININE 1.30 (H) 01/30/2020    LABALBU 4.3 01/30/2020    BILITOT 0.69 01/30/2020    ALKPHOS 73 01/30/2020    AST 27 01/30/2020    ALT 24 01/30/2020    INR 0.9 07/18/2019         Lab Results   Component Value Date    RBC 4.90 01/30/2020    HGB 14.6 01/30/2020    MCV 89.8 01/30/2020    MCH 29.8 01/30/2020    MCHC 33.2 01/30/2020    RDW 14.7 01/30/2020    MPV 8.8 01/30/2020    BASOPCT 1 01/30/2020    LYMPHSABS 2.20 01/30/2020    MONOSABS 0.50 01/30/2020    NEUTROABS 4.50 01/30/2020    EOSABS 0.30 01/30/2020    BASOSABS 0.10 01/30/2020         DIAGNOSTIC TESTING:     No results found. Assessment  1. Guerrero's esophagus without dysplasia    2. Irritable bowel syndrome with both constipation and diarrhea    3. Chronic narcotic use    4. Gastroesophageal reflux disease, esophagitis presence not specified    5. Morbid obesity ([address]        Plan  Pt was given instructions and advice in detail about the symptom of constipation. She was explained about avoidance of fast food, soda pops, cheese and red meat. Was also told to avoid sedatives narcotics and pain killers if possible. Pt was advised to start drinking ample amount of water and liquid. Was told to adapt and follow an exercise regimen.      Instructions were given to increase the amount of fiber including dietary in terms of bran, cereals, whole wheat, brown bread etc. Was also instructed to start

## 2020-06-04 ENCOUNTER — HOSPITAL ENCOUNTER (OUTPATIENT)
Dept: ULTRASOUND IMAGING | Age: 65
Discharge: HOME | End: 2020-06-06
Payer: MEDICARE

## 2020-06-04 PROCEDURE — 76536 US EXAM OF HEAD AND NECK: CPT

## 2020-06-05 RX ORDER — TIZANIDINE 2 MG/1
TABLET ORAL
Qty: 90 TABLET | Refills: 0 | Status: SHIPPED | OUTPATIENT
Start: 2020-06-05 | End: 2020-07-08

## 2020-06-26 ENCOUNTER — HOSPITAL ENCOUNTER (OUTPATIENT)
Dept: WOMENS IMAGING | Age: 65
Discharge: HOME | End: 2020-06-28
Payer: MEDICARE

## 2020-06-26 PROCEDURE — 77063 BREAST TOMOSYNTHESIS BI: CPT

## 2020-06-26 PROCEDURE — 77080 DXA BONE DENSITY AXIAL: CPT

## 2020-07-08 RX ORDER — TIZANIDINE 2 MG/1
TABLET ORAL
Qty: 90 TABLET | Refills: 0 | Status: SHIPPED | OUTPATIENT
Start: 2020-07-08 | End: 2020-08-17

## 2020-08-17 RX ORDER — TIZANIDINE 2 MG/1
TABLET ORAL
Qty: 90 TABLET | Refills: 0 | Status: SHIPPED | OUTPATIENT
Start: 2020-08-17 | End: 2022-01-20

## 2020-09-21 RX ORDER — CLOTRIMAZOLE AND BETAMETHASONE DIPROPIONATE 10; .64 MG/G; MG/G
CREAM TOPICAL
Qty: 15 G | Refills: 0 | Status: SHIPPED | OUTPATIENT
Start: 2020-09-21

## 2020-10-29 ENCOUNTER — HOSPITAL ENCOUNTER (OUTPATIENT)
Dept: HOSPITAL 95 - LAB SHORT | Age: 65
Discharge: HOME | End: 2020-10-29
Attending: FAMILY MEDICINE
Payer: SELF-PAY

## 2020-10-29 DIAGNOSIS — K58.0: Primary | ICD-10-CM

## 2020-11-05 ENCOUNTER — TELEPHONE (OUTPATIENT)
Dept: INTERNAL MEDICINE CLINIC | Age: 65
End: 2020-11-05

## 2020-11-30 LAB
AVERAGE GLUCOSE: 108
HBA1C MFR BLD: 5.4 %

## 2020-12-03 ENCOUNTER — VIRTUAL VISIT (OUTPATIENT)
Dept: GASTROENTEROLOGY | Age: 65
End: 2020-12-03
Payer: COMMERCIAL

## 2020-12-03 ENCOUNTER — TELEPHONE (OUTPATIENT)
Dept: GASTROENTEROLOGY | Age: 65
End: 2020-12-03

## 2020-12-03 PROBLEM — K56.699 SIGMOID STRICTURE (HCC): Status: ACTIVE | Noted: 2020-12-03

## 2020-12-03 PROBLEM — K57.30 SIGMOID DIVERTICULOSIS: Status: ACTIVE | Noted: 2020-12-03

## 2020-12-03 PROCEDURE — 99214 OFFICE O/P EST MOD 30 MIN: CPT | Performed by: INTERNAL MEDICINE

## 2020-12-03 ASSESSMENT — ENCOUNTER SYMPTOMS
EYES NEGATIVE: 1
ALLERGIC/IMMUNOLOGIC NEGATIVE: 1
DIARRHEA: 1
VOMITING: 0
ANAL BLEEDING: 0
ABDOMINAL DISTENTION: 0
ABDOMINAL PAIN: 1
NAUSEA: 1
RESPIRATORY NEGATIVE: 1
BLOOD IN STOOL: 0
RECTAL PAIN: 0
CONSTIPATION: 1

## 2020-12-03 NOTE — PROGRESS NOTES
GI VIDEO FOLLOW UP    Nisha Manley is a 59 y.o. female evaluated via on 12/3/2020. Consent:  She and/or health care decision maker is aware that that she may receive a bill for this telephone service, depending on her insurance coverage, and has provided verbal consent to proceed: YES      INTERVAL HISTORY:   No referring provider defined for this encounter. Chief Complaint   Patient presents with    Follow-up     Barretts and IBS. Patient notes Jak Sprung is helping but fluctuates between constipation and diarrhea. Denies abd pain. Notes improved. 1. Guerrero's esophagus without dysplasia    2. Irritable bowel syndrome with constipation    3. Gastroesophageal reflux disease with esophagitis without hemorrhage    4. Tobacco abuse    5. Chronic narcotic use    6. Sigmoid stricture ([facility] [address]    7. Sigmoid diverticulosis        This patient evaluated via Doxy video visit on December 3, 2020    She has history for above issues  Has history for GERD and better esophagus  She is due for upper endoscopy  She is taking PPI and clinically feeling well GERD wise    She has history for chronic constipation had a sigmoid stricture and had a colonoscopy done which revealed extreme tortuosity of the sigmoid colon extensive diverticulosis and some dilation proximal to that however no gross pathology was noted    She history for chronic narcotic usage and history for constipation taking Linzess which seems to be working for her    She denies any rectal bleeding melanotic stools    Has some abdominal bloating and gas symptoms    Overall clinically feeling well under COVID-19 pandemic and staying home she says        HISTORY OF PRESENT ILLNESS: Dari Patterson is a 59 y.o. female with a past history remarkable for , referred for evaluation of   Chief Complaint   Patient presents with    Follow-up     Barretts and IBS.  Patient notes Linzess is helping but fluctuates between constipation and diarrhea. Denies abd pain. Notes improved. .    Past Medical,Family, and Social History reviewed and does contribute to the patient presenting condition. Patient's PMH/PSH,SH,PSYCH Hx, MEDs, ALLERGIES, and ROS were all reviewed and updated in the appropriate sections. PAST MEDICAL HISTORY:  Past Medical History:   Diagnosis Date    Anemia     Arthritis     back    Guerrero esophagus 11/01/2018    Bruises easily     CAD S/P percutaneous coronary angioplasty 6/8/2017    cardiac stent x1 5-29-09    Chronic back pain, KNEE PAIN AND NECK PAIN.  CKD (chronic kidney disease) stage 3, GFR 30-59 ml/min     Colon polyp 04/16/2019    hyperplastic polyp    DDD (degenerative disc disease)     DDD (degenerative disc disease)     Depression     Diverticulitis     Fibromyalgia     Full dentures     H/O dilation and curettage 07/17/2017    History of blood transfusion     History of myocardial infarction 5/09    Hx of blood clots     left kidney    Hypercholesterolemia     Hypertension     IBS (irritable bowel syndrome)     Neuropathy     Nocturia     Obesity     Post-menopausal bleeding     Short of breath on exertion     Spinal stenosis     Thyroid nodule     Tobacco abuse     'quit 3 yrs ago. \"    Type 2 diabetes mellitus without complication, without long-term current use of insulin ([facility] [address] 6/8/2017    Wears glasses        Past Surgical History:   Procedure Laterality Date    BREAST BIOPSY  2005    Left---Benign    CARDIAC CATHETERIZATION  06/20/2016    no intervention    COLONOSCOPY  2011    diverticulitis    COLONOSCOPY N/A 4/16/2019    hyperplastic polyp    CORONARY ANGIOPLASTY WITH STENT PLACEMENT  05/2009    X 1    DILATION AND CURETTAGE OF UTERUS N/A 7/17/2017    DILATATION AND CURETTAGE OPERATIVE HYSTEROSCOPY WITH MYOSURE, POLYPECTOMY, MYOMECTOMY performed by Justin Gomez DO at [address] [place] [address] CURETTAGE OF UTERUS N/A 7/25/2019    DILATATION AND CURETTAGE HYSTEROSCOPY performed by Yadira Martin DO at [address] HYSTEROSCOPY  12/16/13    Operative Hscope with endometrial sampling, polypectomy & myomectomy  Pete    HYSTEROSCOPY  07/17/2017    NM OFFICE/OUTPT VISIT,PROCEDURE ONLY N/A 1/10/2018    DILATATION AND CURETTAGE HYSTEROSCOPY performed by Yadira Martin DO at [address] [facility]  NEEDLE BIOPSY      TONSILLECTOMY  age 22   Gloriajean Seeds TOTAL KNEE ARTHROPLASTY Left 08/08/2017    TOTAL KNEE ARTHROPLASTY Right 11/20/2018    TUBAL LIGATION  1979    UPPER GASTROINTESTINAL ENDOSCOPY  11/01/2018    [facility]       CURRENT MEDICATIONS:    Current Outpatient Medications:     clotrimazole-betamethasone (LOTRISONE) 1-0.05 % cream, APPLY  CREAM TOPICALLY TO AFFECTED AREA TWICE DAILY FOR 4 DAYS AND THEN ONCE DAILY FOR 3 DAYS, Disp: 15 g, Rfl: 0    tiZANidine (ZANAFLEX) 2 MG tablet, TAKE 1 TABLET BY MOUTH EVERY 8 HOURS AS NEEDED FOR PAIN (Patient taking differently: 4 mg ), Disp: 90 tablet, Rfl: 0    MYRBETRIQ 25 MG TB24, TAKE 1 TABLET BY MOUTH ONCE DAILY, Disp: , Rfl:     Multiple Vitamin (MULTI VITAMIN DAILY PO), Take by mouth, Disp: , Rfl:     calcium citrate-vitamin D (CITRICAL + D) 315-250 MG-UNIT TABS per tablet, Take 1 tablet by mouth 2 times daily (with meals), Disp: , Rfl:     linaclotide (LINZESS) 290 MCG CAPS capsule, Take 1 capsule by mouth every morning (before breakfast), Disp: 30 capsule, Rfl: 2    ONETOUCH VERIO strip, USE 1 STRIP TO CHECK GLUCOSE TWICE DAILY, Disp: , Rfl:     Lancets (ONETOUCH DELICA PLUS [id number]) MISC, USE 1 TO CHECK GLUCOSE 4 TIMES DAILY, Disp: , Rfl:     fluticasone (FLONASE) 50 MCG/ACT nasal spray, 1 spray by Each Nostril route daily as needed for Rhinitis , Disp: , Rfl:     Probiotic Product (PROBIOTIC ADVANCED PO), Take by mouth daily, Disp: , Rfl:     albuterol sulfate HFA (PROVENTIL HFA) 108 (90 Base) MCG/ACT inhaler, Inhale 2 puffs into the lungs, Disp: , Rfl:     Biotin 10 MG CAPS, Take by mouth, Disp: , Rfl:     vitamin E 400 UNIT capsule, Take 400 Units by mouth 2 times daily, Disp: , Rfl:     pantoprazole (PROTONIX) 40 MG tablet, Take 40 mg by mouth daily, Disp: , Rfl:     Cyanocobalamin (VITAMIN B 12 PO), Take 500 mg by mouth, Disp: , Rfl:     ferrous sulfate 325 (65 Fe) MG EC tablet, Take 325 mg by mouth 3 times daily (with meals), Disp: , Rfl:     isosorbide mononitrate (IMDUR) 60 MG extended release tablet, Take 60 mg by mouth daily, Disp: , Rfl:     oxybutynin (DITROPAN) 5 MG tablet, Take 5 mg by mouth 2 times daily, Disp: , Rfl:     HYDROcodone-acetaminophen (NORCO) 5-325 MG per tablet, Take 1 tablet by mouth every 12 hours as needed for Pain., Disp: , Rfl:     DULoxetine (CYMBALTA) 30 MG extended release capsule, Take 60 mg by mouth 2 times daily , Disp: , Rfl:     nitroGLYCERIN (NITROSTAT) 0.4 MG SL tablet, Place 0.4 mg under the tongue every 5 minutes as needed for Chest pain up to max of 3 total doses. If no relief after 1 dose, call 911. , Disp: , Rfl:     metFORMIN (GLUCOPHAGE) 500 MG tablet, Take 500 mg by mouth 2 times daily (with meals) , Disp: , Rfl:     metoprolol (LOPRESSOR) 50 MG tablet, Take 50 mg by mouth 2 times daily. , Disp: , Rfl:     atorvastatin (LIPITOR) 80 MG tablet, Take 80 mg by mouth daily. , Disp: , Rfl:     aspirin 81 MG EC tablet, Take 81 mg by mouth daily.   , Disp: , Rfl:     ALLERGIES:   Allergies   Allergen Reactions    Pcn [Penicillins] Hives    Amlodipine      Leg swelling    Codeine Other (See Comments)     Stomach cramps    Gabapentin Other (See Comments)     Causes hands shake and unable to hold anything    Lyrica [Pregabalin] Other (See Comments)     \"trouble concentrating\"    Sulfa Antibiotics Other (See Comments)     Cramps    Oxycodone-Acetaminophen Itching and Rash       FAMILY HISTORY:       Problem Relation Age of Onset    Heart Attack Father     Diabetes Mother    Amy De Leon Other Mother         brain bleed    Other Daughter         blood clotting disorder    Breast Cancer Neg Hx     Cancer Neg Hx     Colon Cancer Neg Hx     Eclampsia Neg Hx     Hypertension Neg Hx     Ovarian Cancer Neg Hx      Labor Neg Hx     Spont Abortions Neg Hx     Stroke Neg Hx     Heart Disease Neg Hx          SOCIAL HISTORY:   Social History     Socioeconomic History    Marital status:      Spouse name: Not on file    Number of children: Not on file    Years of education: Not on file    Highest education level: Not on file   Occupational History    Not on file   Social Needs    Financial resource strain: Not on file    Food insecurity     Worry: Not on file     Inability: Not on file    Transportation needs     Medical: Not on file     Non-medical: Not on file   Tobacco Use    Smoking status: Former Smoker     Packs/day: 0.50     Years: 40.00     Pack years: 20.00     Last attempt to quit: 2012     Years since quittin.8    Smokeless tobacco: Never Used   Substance and Sexual Activity    Alcohol use: Not Currently     Frequency: 2-4 times a month     Drinks per session: 1 or 2     Binge frequency: Never    Drug use: No    Sexual activity: Never     Birth control/protection: Post-menopausal   Lifestyle    Physical activity     Days per week: Not on file     Minutes per session: Not on file    Stress: Not on file   Relationships    Social connections     Talks on phone: Not on file     Gets together: Not on file     Attends Yazidi service: Not on file     Active member of club or organization: Not on file     Attends meetings of clubs or organizations: Not on file     Relationship status: Not on file    Intimate partner violence     Fear of current or ex partner: Not on file     Emotionally abused: Not on file     Physically abused: Not on file     Forced sexual activity: Not on file   Other Topics Concern    Not on file   Social History Narrative    Not on file REVIEW OF SYSTEMS:         Review of Systems   Constitutional: Negative. Eyes: Negative. Respiratory: Negative. Cardiovascular: Negative. Gastrointestinal: Positive for abdominal pain, constipation, diarrhea and nausea. Negative for abdominal distention, anal bleeding, blood in stool, rectal pain and vomiting. Endocrine: Negative. Genitourinary: Negative. Musculoskeletal: Negative. Skin: Negative. Allergic/Immunologic: Negative. Neurological: Negative. Hematological: Negative. Psychiatric/Behavioral: Negative. LABORATORY DATA: Reviewed  Lab Results   Component Value Date    WBC 7.3 06/02/2020    HGB 14.4 06/02/2020    HCT 43.3 06/02/2020    MCV 89.0 06/02/2020     06/02/2020     06/02/2020    K 4.8 06/02/2020     06/02/2020    CO2 24 06/02/2020    BUN 19 06/02/2020    CREATININE 1.26 (H) 06/02/2020    LABALBU 4.0 06/02/2020    BILITOT 0.48 06/02/2020    ALKPHOS 73 06/02/2020    AST 26 06/02/2020    ALT 19 06/02/2020    INR 0.9 07/18/2019         Lab Results   Component Value Date    RBC 4.87 06/02/2020    HGB 14.4 06/02/2020    MCV 89.0 06/02/2020    MCH 29.5 06/02/2020    MCHC 33.2 06/02/2020    RDW 14.8 06/02/2020    MPV 8.8 06/02/2020    BASOPCT 1 06/02/2020    LYMPHSABS 1.60 06/02/2020    MONOSABS 0.50 06/02/2020    NEUTROABS 5.00 06/02/2020    EOSABS 0.20 06/02/2020    BASOSABS 0.10 06/02/2020         DIAGNOSTIC TESTING:     No results found. Assessment  1. Guerrero's esophagus without dysplasia    2. Irritable bowel syndrome with constipation    3. Gastroesophageal reflux disease with esophagitis without hemorrhage    4. Tobacco abuse    5. Chronic narcotic use    6. Sigmoid stricture ([facility] [address]    7.  Sigmoid diverticulosis          VIRTUAL PHYSICAL EXAMINATION:     [ INSTRUCTIONS:  \"[x]\" Indicates a positive item  \"[]\" Indicates a negative item  -- DELETE ALL ITEMS NOT EXAMINED]  Vital Signs: (As obtained by patient/caregiver or practitioner observation)    Blood pressure-  Heart rate-    Respiratory rate-    Temperature-  Pulse oximetry-     Constitutional: [x] Appears well-developed and well-nourished [x] No apparent distress      [] Abnormal-   Mental status  [] Alert and awake  [] Oriented to person/place/time []Able to follow commands      Eyes:  EOM    [x]  Normal  [] Abnormal-  Sclera  [x]  Normal  [] Abnormal -         Discharge [x]  None visible  [] Abnormal -    HENT:   [x] Normocephalic, atraumatic. [] Abnormal   [x] Mouth/Throat: Mucous membranes are moist.     External Ears [x] Normal  [] Abnormal-     Neck: [x] No visualized mass     Pulmonary/Chest: [x] Respiratory effort normal.  [x] No visualized signs of difficulty breathing or respiratory distress        [] Abnormal-      Musculoskeletal:   [x] Normal gait with no signs of ataxia         [x] Normal range of motion of neck        [] Abnormal-       Neurological:        [x] No Facial Asymmetry (Cranial nerve 7 motor function) (limited exam to video visit)          [x] No gaze palsy        [] Abnormal-         Skin:        [x] No significant exanthematous lesions or discoloration noted on facial skin         [] Abnormal-            Psychiatric:       [x] Normal Affect [x] No Hallucinations        [] Abnormal-     Other pertinent observable physical exam findings-         IMPRESSION: Ms. Godfrey Hernandez is a 59 y.o. female with   Continue taking Linzess  Continue taking PPI      Pt seems to have signs and symptoms consistent with GERD, acid indigestion and heartburns. She was discussed  in detail about some possible life style and dietary modifications. She was stressed about the maintenance  of appropriate weight and effect of obesity contributing to reflux symptoms. Routine exercise was streesed. Avoidance of Caffeine, nicotine and chocolate were explained. Pt was asked to avoid spices grease and fried food.  Advices were also given about avoidance of any kind of fast foods,  in the office  Patient was asked to give us a call for any questions  The patient has verbalized understanding and agreement to this plan. The patient was instructed to start taking some OTC Probiotics products   These are available over the counter at the Pharmacy stores and Grocery stores  He was explained about the beneficial effects they have in the GI track  They will help to establish the good bacterial rosy and will help with the digestion and bowel movements  The patient has verbalized understanding and agreement to this plan    Pt was advised in detail about some life style and dietary modifications. She was advised about avoidance of caffeine, nicotine and chocolate. Pt was also told to stay away from any kind of fast foods, soda pops. She was also advised to avoid lots of spices, grease and fried food etc.     Instructions were also given about trying to arrange the timing, quality and quantity of food. Instructions were given about using ample amount of fiber including dietary and supplemental fiber either metamucil, bennafiber or citrucell etc.  Pt was advised about drinking ample amount of water without any colors or chemicals. Stress was given about regular exercise. Pt has verbalized understanding and agreement to these modifications. Diet/life style/natural hx /complication of the dx were all explained in details   Past medical, past surgical, social history, psychiatric history, medications or allergies, all reviewed and  updated    Amy Paige is a 59 y.o. female being evaluated by a Virtual Visit (video visit) encounter to address concerns as mentioned above. A caregiver was present when appropriate. Due to this being a TeleHealth encounter (During DJIFY-01 public health emergency), evaluation of the following organ systems was limited: Vitals/Constitutional/EENT/Resp/CV/GI//MS/Neuro/Skin/Heme-Lymph-Imm.   Pursuant to the emergency declaration under the [facility] [address] waiver authority and the [employer] Act, this Virtual Visit was conducted with patient's (and/or legal guardian's) consent, to reduce the patient's risk of exposure to COVID-19 and provide necessary medical care. The patient (and/or legal guardian) has also been advised to contact this office for worsening conditions or problems, and seek emergency medical treatment and/or call 911 if deemed necessary. Services were provided through a video synchronous discussion virtually to substitute for in-person clinic visit. Patient and provider were located at their individual homes. -- on 12/3/2020 at 1:14 PM    Total Time: minutes: 21-30 minutes    Services were provided through a video synchronous discussion virtually to substitute for in-person clinic visit. Thank you for allowing me to participate in the care of Ms. Godfrey Hernandez. For any further questions please do not hesitate to contact me. I have reviewed and agree with the ROS entered by the MA/RN. Note is dictated utilizing voice recognition software. Unfortunately this leads to occasional typographical errors. Please contact our office if you have any questions. An electronic signature was used to authenticate this note.     Alvino Henry MD, [facility]  Board Certified in Gastroenterology and [address] [place] [address] Gastroenterology  Office #: [phone]

## 2020-12-03 NOTE — TELEPHONE ENCOUNTER
Follow up after VV today - No labs or imaging ordered at this visit, recall letter placed for January to schedule EGD Detail Level: Zone Include Location In Plan?: No

## 2020-12-07 ENCOUNTER — TELEPHONE (OUTPATIENT)
Dept: INTERNAL MEDICINE CLINIC | Age: 65
End: 2020-12-07

## 2020-12-07 RX ORDER — LINACLOTIDE 290 UG/1
CAPSULE, GELATIN COATED ORAL
Qty: 30 CAPSULE | Refills: 3 | Status: SHIPPED | OUTPATIENT
Start: 2020-12-07 | End: 2021-04-06

## 2020-12-23 NOTE — TELEPHONE ENCOUNTER
Patient Name: Singh Gordon  Procedure Date: 9/18/2019 8:01 AM  MRN: [id number]  Account Number: [id number]  YOB: 2001  Age: 18  Attending MD: Mango Riggs MD  Grafts or Implants: No  Procedure:            Ileoscopy with placement of decompressive ileostomy tube  Indications:          Follow-up endoscopy after surgery, Inflammatory bowel                        disease, Ostomy dysfunction  Patient Profile:      This is an 18 year old male. Refer to note in patient                        chart for documentation of history and physical.  Providers:            Mango Riggs MD, Francisco Crooks MD  Referring MD:         Sridevi Drake Md  Attending Participation:       I personally performed the entire procedure.  Sedation:             Monitored Anesthesia Care  Procedure:       Pre-Anesthesia Assessment:       - Prior to the procedure, a History and Physical was performed, and       patient medications and allergies were reviewed. The patient's tolerance       of previous anesthesia was also reviewed. The risks and benefits of the       procedure and the sedation options and risks were discussed with the       patient. All questions were answered, and informed consent was obtained.       Prior Anticoagulants: The patient has taken no previous anticoagulant or       antiplatelet agents. ASA Grade Assessment: II - A patient with mild       systemic disease. After reviewing the risks and benefits, the patient       was deemed in satisfactory condition to undergo the procedure.       After I obtained informed consent, the scope was passed under direct       vision. Throughout the procedure, the patient's blood pressure, pulse,       and oxygen saturations were monitored continuously. The endoscope was       introduced through the anus and advanced to the distal ileum. The       ileoscopy was performed without difficulty. The patient tolerated the       procedure well. The quality of the  Writer left voicemail message requesting call back to discuss scheduling EGD ordered by Dr Shayna Salazar at 12/3/20 virtual visit. bowel preparation was good.  Findings:       The terminal ileum appeared mildly distended but with normal mucosa       otherwise.       A 24 Bardex decompressive tube was placed under the direct vision and       attached to the waffer with 2-0 silk sutures and a safety pin.  Impression:       - The examined portion of the ileum was normal.       - Successful decompressive tube placement.       - No specimens collected.  Recommendation:       - Return patient to hospital justin for ongoing care.  Complications:        No immediate complications.  Estimated Blood Loss: Estimated blood loss: none.  MD Mango Grant MD  9/18/2019 8:36:09 AM  This report has been signed electronically by the above.  Francisco Crooks MD  Number of Addenda: 0

## 2021-02-19 ENCOUNTER — HOSPITAL ENCOUNTER (OUTPATIENT)
Dept: HOSPITAL 95 - LAB | Age: 66
End: 2021-02-19
Attending: STUDENT IN AN ORGANIZED HEALTH CARE EDUCATION/TRAINING PROGRAM
Payer: MEDICARE

## 2021-02-19 DIAGNOSIS — D22.5: ICD-10-CM

## 2021-02-19 DIAGNOSIS — D22.9: Primary | ICD-10-CM

## 2021-03-25 ENCOUNTER — HOSPITAL ENCOUNTER (OUTPATIENT)
Dept: HOSPITAL 95 - LAB SHORT | Age: 66
Discharge: HOME | End: 2021-03-25
Attending: DERMATOLOGY
Payer: MEDICARE

## 2021-03-25 DIAGNOSIS — D22.5: Primary | ICD-10-CM

## 2021-05-23 ENCOUNTER — APPOINTMENT (OUTPATIENT)
Dept: GENERAL RADIOLOGY | Age: 66
End: 2021-05-23
Payer: MEDICARE

## 2021-05-23 ENCOUNTER — HOSPITAL ENCOUNTER (EMERGENCY)
Age: 66
Discharge: ANOTHER HOSPITAL | End: 2021-05-23
Attending: EMERGENCY MEDICINE
Payer: MEDICARE

## 2021-05-23 VITALS
SYSTOLIC BLOOD PRESSURE: 145 MMHG | DIASTOLIC BLOOD PRESSURE: 85 MMHG | OXYGEN SATURATION: 97 % | HEART RATE: 63 BPM | BODY MASS INDEX: 38.45 KG/M2 | TEMPERATURE: 97.4 F | WEIGHT: 245 LBS | RESPIRATION RATE: 13 BRPM | HEIGHT: 67 IN

## 2021-05-23 DIAGNOSIS — R55 PRE-SYNCOPE: Primary | ICD-10-CM

## 2021-05-23 LAB
ABSOLUTE EOS #: 0.18 K/UL (ref 0–0.44)
ABSOLUTE IMMATURE GRANULOCYTE: <0.03 K/UL (ref 0–0.3)
ABSOLUTE LYMPH #: 1.94 K/UL (ref 1.1–3.7)
ABSOLUTE MONO #: 0.65 K/UL (ref 0.1–1.2)
ANION GAP SERPL CALCULATED.3IONS-SCNC: 10 MMOL/L (ref 9–17)
BASOPHILS # BLD: 1 % (ref 0–2)
BASOPHILS ABSOLUTE: 0.05 K/UL (ref 0–0.2)
BUN BLDV-MCNC: 13 MG/DL (ref 8–23)
BUN/CREAT BLD: ABNORMAL (ref 9–20)
CALCIUM SERPL-MCNC: 9.7 MG/DL (ref 8.6–10.4)
CHLORIDE BLD-SCNC: 104 MMOL/L (ref 98–107)
CO2: 26 MMOL/L (ref 20–31)
CREAT SERPL-MCNC: 1.18 MG/DL (ref 0.5–0.9)
DIFFERENTIAL TYPE: ABNORMAL
EOSINOPHILS RELATIVE PERCENT: 2 % (ref 1–4)
GFR AFRICAN AMERICAN: 56 ML/MIN
GFR NON-AFRICAN AMERICAN: 46 ML/MIN
GFR SERPL CREATININE-BSD FRML MDRD: ABNORMAL ML/MIN/{1.73_M2}
GFR SERPL CREATININE-BSD FRML MDRD: ABNORMAL ML/MIN/{1.73_M2}
GLUCOSE BLD-MCNC: 82 MG/DL (ref 70–99)
HCT VFR BLD CALC: 44 % (ref 36.3–47.1)
HEMOGLOBIN: 14.3 G/DL (ref 11.9–15.1)
IMMATURE GRANULOCYTES: 0 %
LYMPHOCYTES # BLD: 23 % (ref 24–43)
MCH RBC QN AUTO: 30.2 PG (ref 25.2–33.5)
MCHC RBC AUTO-ENTMCNC: 32.5 G/DL (ref 28.4–34.8)
MCV RBC AUTO: 92.8 FL (ref 82.6–102.9)
MONOCYTES # BLD: 8 % (ref 3–12)
NRBC AUTOMATED: 0 PER 100 WBC
PDW BLD-RTO: 13.2 % (ref 11.8–14.4)
PLATELET # BLD: 251 K/UL (ref 138–453)
PLATELET ESTIMATE: ABNORMAL
PMV BLD AUTO: 10.2 FL (ref 8.1–13.5)
POTASSIUM SERPL-SCNC: 4.5 MMOL/L (ref 3.7–5.3)
RBC # BLD: 4.74 M/UL (ref 3.95–5.11)
RBC # BLD: ABNORMAL 10*6/UL
SEG NEUTROPHILS: 66 % (ref 36–65)
SEGMENTED NEUTROPHILS ABSOLUTE COUNT: 5.63 K/UL (ref 1.5–8.1)
SODIUM BLD-SCNC: 140 MMOL/L (ref 135–144)
TROPONIN INTERP: NORMAL
TROPONIN INTERP: NORMAL
TROPONIN T: NORMAL NG/ML
TROPONIN T: NORMAL NG/ML
TROPONIN, HIGH SENSITIVITY: 8 NG/L (ref 0–14)
TROPONIN, HIGH SENSITIVITY: 8 NG/L (ref 0–14)
WBC # BLD: 8.5 K/UL (ref 3.5–11.3)
WBC # BLD: ABNORMAL 10*3/UL

## 2021-05-23 PROCEDURE — 93005 ELECTROCARDIOGRAM TRACING: CPT | Performed by: STUDENT IN AN ORGANIZED HEALTH CARE EDUCATION/TRAINING PROGRAM

## 2021-05-23 PROCEDURE — 99285 EMERGENCY DEPT VISIT HI MDM: CPT

## 2021-05-23 PROCEDURE — 84484 ASSAY OF TROPONIN QUANT: CPT

## 2021-05-23 PROCEDURE — 85025 COMPLETE CBC W/AUTO DIFF WBC: CPT

## 2021-05-23 PROCEDURE — 71045 X-RAY EXAM CHEST 1 VIEW: CPT

## 2021-05-23 PROCEDURE — 80048 BASIC METABOLIC PNL TOTAL CA: CPT

## 2021-05-23 NOTE — ED PROVIDER NOTES
[facility]  ED  Emergency Department Encounter  EmergencyMedicine Resident     Pt Vandana Bates  MRN: [id number]  [place] 1955  Date of evaluation: 5/23/21  PCP:  Guanaco Bowman MD    [address] [place] [address]       Chief Complaint   Patient presents with    Atrial Fibrillation     pt is wearing heart monitor due to ongoing sob. pt was called by monitor company and told and had episode of a-fib and needed to seek medical attention. pt states she did have episode where she felt light headed like she could pass out. pt denies chest pain       HISTORY OF PRESENT ILLNESS  (Location/Symptom, Timing/Onset, Context/Setting, Quality, Duration, Modifying Factors, Severity.)      Giovanna Sutherland is a 72 y.o. female who presents sent by cardiologist for A.fib. Patient states she has been having episodes of dizziness and headache inermittently and saw her cardiologist who placed her on an event monitor. She had 2 events today, reportedly rate controlled A.fib and was called by her cardiologist group twice. She was told to report to the ED. Hx includes CAD s/p stent x1 in 2009, chronic back pain, bilateral knee replacement, fibromyalgia, and DM. Received Call from cardiology group to ED stating that the patient had an 8 second pause on event monitor patient's cardiologist is requesting transfer as patient likely requires a pacemaker.       PAST MEDICAL / SURGICAL / SOCIAL / FAMILY HISTORY      has a past medical history of Anemia, Arthritis, Guerrero esophagus, Bruises easily, CAD S/P percutaneous coronary angioplasty, Chronic back pain, KNEE PAIN AND NECK PAIN., CKD (chronic kidney disease) stage 3, GFR 30-59 ml/min, Colon polyp, DDD (degenerative disc disease), DDD (degenerative disc disease), Depression, Diverticulitis, Fibromyalgia, Full dentures, H/O dilation and curettage, History of blood transfusion, History of myocardial infarction, Hx of blood clots, Hypercholesterolemia, Hypertension, IBS (irritable bowel syndrome), Neuropathy, Nocturia, Obesity, Post-menopausal bleeding, Short of breath on exertion, Spinal stenosis, Thyroid nodule, Tobacco abuse, Type 2 diabetes mellitus without complication, without long-term current use of insulin ([facility] [address] and Wears glasses. has a past surgical history that includes Tubal ligation (); Breast biopsy (); Coronary angioplasty with stent (2009); Tonsillectomy (age 22); hysteroscopy (13); Colonoscopy (); hysteroscopy (2017); Dilation and curettage of uterus (N/A, 2017); Total knee arthroplasty (Left, 2017); pr office/outpt visit,procedure only (N/A, 1/10/2018); Upper gastrointestinal endoscopy (2018); Colonoscopy (N/A, 2019); Total knee arthroplasty (Right, 2018); Cardiac catheterization (2016); pr sono guide needle biopsy; and Dilation and curettage of uterus (N/A, 2019). Social History     Socioeconomic History    Marital status:      Spouse name: Not on file    Number of children: Not on file    Years of education: Not on file    Highest education level: Not on file   Occupational History    Not on file   Tobacco Use    Smoking status: Former Smoker     Packs/day: 0.50     Years: 40.00     Pack years: 20.00     Quit date: 2012     Years since quittin.3    Smokeless tobacco: Never Used   Vaping Use    Vaping Use: Never used   Substance and Sexual Activity    Alcohol use: Not Currently    Drug use: No    Sexual activity: Never     Birth control/protection: Post-menopausal   Other Topics Concern    Not on file   Social History Narrative    Not on file     Social Determinants of Health     Financial Resource Strain:     Difficulty of Paying Living Expenses:    Food Insecurity:     Worried About Running Out of Food in the Last Year:     [address] in the Last Year:    Transportation Needs:     Lack of Transportation (Medical):      Lack of Transportation (Non-Medical):    Physical Activity:     Days of Exercise per Week:     Minutes of Exercise per Session:    Stress:     Feeling of Stress :    Social Connections:     Frequency of Communication with Friends and Family:     Frequency of Social Gatherings with Friends and Family:     Attends [employer] Services:     Active Member of Clubs or Organizations:     Attends Club or Organization Meetings:     Marital Status:    Intimate Partner Violence:     Fear of Current or Ex-Partner:     Emotionally Abused:     Physically Abused:     Sexually Abused:        Family History   Problem Relation Age of Onset    Heart Attack Father     Diabetes Mother     Other Mother         brain bleed    Other Daughter         blood clotting disorder    Breast Cancer Neg Hx     Cancer Neg Hx     Colon Cancer Neg Hx     Eclampsia Neg Hx     Hypertension Neg Hx     Ovarian Cancer Neg Hx      Labor Neg Hx     Spont Abortions Neg Hx     Stroke Neg Hx     Heart Disease Neg Hx        Allergies:  Pcn [penicillins], Amlodipine, Codeine, Gabapentin, Lyrica [pregabalin], Sulfa antibiotics, and Oxycodone-acetaminophen    Home Medications:  Prior to Admission medications    Medication Sig Start Date End Date Taking?  Authorizing Provider   LINZESS 290 MCG CAPS capsule TAKE 1 CAPSULE BY MOUTH ONCE DAILY IN THE MORNING BEFORE BREAKFAST 21   Cecilia Ash MD   clotrimazole-betamethasone (LOTRISONE) 1-0.05 % cream APPLY  CREAM TOPICALLY TO AFFECTED AREA TWICE DAILY FOR 4 DAYS AND THEN ONCE DAILY FOR 3 DAYS 20   LG Jha - CNP   tiZANidine (ZANAFLEX) 2 MG tablet TAKE 1 TABLET BY MOUTH EVERY 8 HOURS AS NEEDED FOR PAIN  Patient taking differently: 4 mg  20   Elizabeth Lyle MD   MYRBETRIQ 25 MG TB24 TAKE 1 TABLET BY MOUTH ONCE DAILY 20   Historical Provider, MD   Multiple Vitamin (MULTI VITAMIN DAILY PO) Take by mouth    Historical Provider, MD   calcium citrate-vitamin D (CITRICAL + D) 315-702 MG-UNIT TABS per tablet Take 1 tablet by mouth 2 times daily (with meals)    Historical Provider, MD Dayna Nichols strip USE 1 STRIP TO Kylie [address] DAILY 1/16/20   Historical Provider, MD [place] (Andrew Bains) [address] [facility] USE 1 TO CHECK GLUCOSE 4 TIMES DAILY 1/16/20   Historical Provider, MD   fluticasone (FLONASE) 50 MCG/ACT nasal spray 1 spray by Each Nostril route daily as needed for Rhinitis     Historical Provider, MD   Probiotic Product (PROBIOTIC ADVANCED PO) Take by mouth daily    Historical Provider, MD   albuterol sulfate HFA (PROVENTIL HFA) 108 (90 Base) MCG/ACT inhaler Inhale 2 puffs into the lungs 4/29/19   Historical Provider, MD   Biotin 10 MG CAPS Take by mouth    Historical Provider, MD   vitamin E 400 UNIT capsule Take 400 Units by mouth 2 times daily    Historical Provider, MD   pantoprazole (PROTONIX) 40 MG tablet Take 40 mg by mouth daily    Historical Provider, MD   Cyanocobalamin (VITAMIN B 12 PO) Take 500 mg by mouth    Historical Provider, MD   ferrous sulfate 325 (65 Fe) MG EC tablet Take 325 mg by mouth 3 times daily (with meals)    Historical Provider, MD   isosorbide mononitrate (IMDUR) 60 MG extended release tablet Take 60 mg by mouth daily 9/13/18   Historical Provider, MD   oxybutynin (DITROPAN) 5 MG tablet Take 5 mg by mouth 2 times daily    Historical Provider, MD   HYDROcodone-acetaminophen (NORCO) 5-325 MG per tablet Take 1 tablet by mouth every 12 hours as needed for Pain. Historical Provider, MD   DULoxetine (CYMBALTA) 30 MG extended release capsule Take 60 mg by mouth 2 times daily     Historical Provider, MD   nitroGLYCERIN (NITROSTAT) 0.4 MG SL tablet Place 0.4 mg under the tongue every 5 minutes as needed for Chest pain up to max of 3 total doses. If no relief after 1 dose, call 911.      Historical Provider, MD   metFORMIN (GLUCOPHAGE) 500 MG tablet Take 500 mg by mouth 2 times daily (with meals)     Historical Provider, MD   metoprolol (LOPRESSOR) 50 MG tablet Take 50 mg by mouth 2 times daily. Historical Provider, MD   atorvastatin (LIPITOR) 80 MG tablet Take 80 mg by mouth daily. Historical Provider, MD   aspirin 81 MG EC tablet Take 81 mg by mouth daily. Historical Provider, MD       REVIEW OF SYSTEMS    (2-9 systems for level 4, 10 or more for level 5)      Review of Systems   Constitutional: Negative for activity change, chills and fever. HENT: Negative for congestion, sinus pressure, sinus pain and sore throat. Eyes: Negative for pain and itching. Respiratory: Negative for cough and shortness of breath. Cardiovascular: Negative for chest pain, palpitations and leg swelling. Gastrointestinal: Negative for abdominal distention, abdominal pain, constipation, diarrhea and nausea. Endocrine: Negative for polyuria. Genitourinary: Negative for dysuria and frequency. Musculoskeletal: Negative for arthralgias. Skin: Negative for rash. Neurological: Positive for dizziness, weakness and headaches. Negative for light-headedness. PHYSICAL EXAM   (up to 7 for level 4, 8 or more for level 5)      INITIAL VITALS:   BP (!) 175/100   Pulse 60   Temp 97.4 °F (36.3 °C)   Resp 20   Ht 5' 7\" (1.702 m)   Wt 245 lb (111.1 kg)   LMP 06/29/2002 (Within Months)   SpO2 98%   BMI 38.37 kg/m²     Physical Exam  Vitals reviewed. Constitutional:       General: She is not in acute distress. Appearance: She is not toxic-appearing. HENT:      Head: Normocephalic and atraumatic. Ears:      Comments: Hearing grossly normal     Nose: Nose normal.      Mouth/Throat:      Mouth: Mucous membranes are moist.      Pharynx: Oropharynx is clear. Eyes:      General: No scleral icterus. Conjunctiva/sclera: Conjunctivae normal.      Pupils: Pupils are equal, round, and reactive to light. Cardiovascular:      Rate and Rhythm: Regular rhythm. Bradycardia present. Pulses: Normal pulses.       Heart sounds: Normal heart sounds. No murmur heard. No friction rub. Comments: Rate 57  Pulmonary:      Effort: Pulmonary effort is normal. No respiratory distress. Breath sounds: Normal breath sounds. Abdominal:      General: There is no distension. Palpations: Abdomen is soft. Tenderness: There is no abdominal tenderness. There is no guarding. Musculoskeletal:      Cervical back: No muscular tenderness. Right lower leg: No edema. Left lower leg: No edema. Skin:     General: Skin is warm and dry. Capillary Refill: Capillary refill takes less than 2 seconds. Neurological:      General: No focal deficit present. Mental Status: She is alert and oriented to person, place, and time. Mental status is at baseline. Psychiatric:         Mood and Affect: Mood normal.         DIFFERENTIAL  DIAGNOSIS     PLAN (LABS / IMAGING / EKG):  Orders Placed This Encounter   Procedures    XR CHEST PORTABLE    Basic Metabolic Panel    CBC Auto Differential    Troponin    Telemetry monitoring    Continuous Pulse Oximetry    EKG 12 Lead    Insert peripheral IV       MEDICATIONS ORDERED:  No orders of the defined types were placed in this encounter. DIAGNOSTIC RESULTS / EMERGENCY DEPARTMENT COURSE / MDM   LAB RESULTS:  No results found for this visit on 05/23/21. IMPRESSION: Rosa Potts is a 72 y.o. woman presenting after being sent by her cardiologist for reported A. fib and 8-second pause on cardiac event monitor. Patient's cardiologist has requested transfer to [facility] for consideration for pacemaker placement. Blood counts were normal, normal electrolytes and creatinine of 1.18 which is at baseline. Troponin of 8 and nonischemic EKG. Patient is stable to transfer pending bed availability.     RADIOLOGY:  XR CHEST PORTABLE    Result Date: 5/23/2021  No acute cardiopulmonary disease      EKG  Regular, bradycardic, no ST segment elevations or depressions    All EKG's are interpreted by the Emergency Department Physician who either signs or Co-signs this chart in the absence of a cardiologist.    EMERGENCY DEPARTMENT COURSE:  Patient seen and evaluated, VSS and nontoxic in appearance. W/u as discussed above, generally unermarkable. Patient is agreeable to transfer for admission by her regular cardiology group and consideration for pacemaker placement. PROCEDURES:  none    CONSULTS:  None    CRITICAL CARE:  Please see attending note    FINAL IMPRESSION      1. Pre-syncope          DISPOSITION / PLAN     DISPOSITION Decision To Transfer 05/23/2021 17:93:23 PM      PATIENT REFERRED TO:  No follow-up provider specified.     DISCHARGE MEDICATIONS:  New Prescriptions    No medications on file       Colin Schaffer DO  Emergency Medicine Resident    (Please note that portions of thisnote were completed with a voice recognition program.  Efforts were made to edit the dictations but occasionally words are mis-transcribed.)       Colin Schaffer DO  Resident  05/24/21 [id number]

## 2021-05-23 NOTE — ED NOTES
Pt arrived with complaints of chest discomfort. Pt does have a cardiac monitor on. Pt stated that the group who gave her the monitor called her and told her to be seen by an emergency department. Pt stated that she was SOB todaty and did have some discomfort. Ekg perfomed. Call light within reach. Pt placed on full cardiac moitor.  Will continue plan of care      Connie Canchola RN  05/23/21 [id number]

## 2021-05-24 ASSESSMENT — ENCOUNTER SYMPTOMS
NAUSEA: 0
SORE THROAT: 0
SINUS PRESSURE: 0
SINUS PAIN: 0
COUGH: 0
EYE ITCHING: 0
ABDOMINAL DISTENTION: 0
EYE PAIN: 0
CONSTIPATION: 0
ABDOMINAL PAIN: 0
SHORTNESS OF BREATH: 0
DIARRHEA: 0

## 2021-05-24 NOTE — ED NOTES
Report given to [id number] [facility] at [facility]. Nurse stated understanding and had no further questions or concerns.       Quyen Suarez, RN  05/23/21 [id number]

## 2021-05-24 NOTE — ED PROVIDER NOTES
and Medical Decision Making performed by medical students or scribes is based on my personal performance of the HPI, PE and MDM. For Phys Assistant/ Nurse Practitioner cases/documentation I have had a face to face evaluation of this patient and have completed at least one if not all key elements of the E/M (history, physical exam, and MDM). Additional findings are as noted. For APC cases I have personally evaluated and examined the patient in conjunction with the APC and agree with the treatment plan and disposition of the patient as recorded by the APC.     Hipolito Troncoso MD  Attending Emergency  Physician       Nicole Faye MD  05/23/21 [id number]

## 2021-05-25 LAB
EKG ATRIAL RATE: 59 BPM
EKG P AXIS: 20 DEGREES
EKG P-R INTERVAL: 140 MS
EKG Q-T INTERVAL: 436 MS
EKG QRS DURATION: 90 MS
EKG QTC CALCULATION (BAZETT): 431 MS
EKG R AXIS: 7 DEGREES
EKG T AXIS: 24 DEGREES
EKG VENTRICULAR RATE: 59 BPM

## 2021-08-04 PROBLEM — I25.10 CORONARY ATHEROSCLEROSIS: Status: ACTIVE | Noted: 2021-08-04

## 2021-08-04 PROBLEM — N39.44 NOCTURNAL ENURESIS: Status: ACTIVE | Noted: 2021-05-03

## 2021-08-04 PROBLEM — Z95.0 CARDIAC PACEMAKER IN SITU: Status: ACTIVE | Noted: 2021-06-24

## 2021-08-04 PROBLEM — N18.30 CHRONIC RENAL IMPAIRMENT, STAGE 3 (MODERATE) (HCC): Status: ACTIVE | Noted: 2017-05-15

## 2021-08-04 PROBLEM — N32.81 OAB (OVERACTIVE BLADDER): Status: ACTIVE | Noted: 2021-05-03

## 2021-08-04 PROBLEM — I49.5 SICK SINUS SYNDROME (HCC): Status: ACTIVE | Noted: 2021-06-04

## 2021-08-04 PROBLEM — E66.01 SEVERE OBESITY (BMI 35.0-39.9) WITH COMORBIDITY (HCC): Status: ACTIVE | Noted: 2021-06-18

## 2021-08-10 ENCOUNTER — HOSPITAL ENCOUNTER (OUTPATIENT)
Dept: WOMENS IMAGING | Age: 66
Discharge: HOME | End: 2021-08-12
Payer: MEDICARE

## 2021-08-10 DIAGNOSIS — Z12.39 SCREENING BREAST EXAMINATION: ICD-10-CM

## 2021-08-10 PROCEDURE — 77063 BREAST TOMOSYNTHESIS BI: CPT

## 2021-08-17 ENCOUNTER — HOSPITAL ENCOUNTER (OUTPATIENT)
Age: 66
Discharge: HOME | End: 2021-08-17
Payer: MEDICARE

## 2021-08-17 LAB
-: ABNORMAL
ABSOLUTE EOS #: 0.3 K/UL (ref 0–0.4)
ABSOLUTE IMMATURE GRANULOCYTE: ABNORMAL K/UL (ref 0–0.3)
ABSOLUTE LYMPH #: 1.4 K/UL (ref 1–4.8)
ABSOLUTE MONO #: 0.4 K/UL (ref 0.1–1.3)
ALBUMIN SERPL-MCNC: 4.1 G/DL (ref 3.5–5.2)
ALBUMIN/GLOBULIN RATIO: ABNORMAL (ref 1–2.5)
ALP BLD-CCNC: 61 U/L (ref 35–104)
ALT SERPL-CCNC: 23 U/L (ref 5–33)
AMORPHOUS: ABNORMAL
ANION GAP SERPL CALCULATED.3IONS-SCNC: 10 MMOL/L (ref 9–17)
ANION GAP SERPL CALCULATED.3IONS-SCNC: 10 MMOL/L (ref 9–17)
AST SERPL-CCNC: 25 U/L
BACTERIA: ABNORMAL
BASOPHILS # BLD: 1 % (ref 0–2)
BASOPHILS ABSOLUTE: 0 K/UL (ref 0–0.2)
BILIRUB SERPL-MCNC: 0.69 MG/DL (ref 0.3–1.2)
BILIRUBIN URINE: ABNORMAL
BUN BLDV-MCNC: 19 MG/DL (ref 8–23)
BUN BLDV-MCNC: 19 MG/DL (ref 8–23)
BUN/CREAT BLD: ABNORMAL (ref 9–20)
BUN/CREAT BLD: ABNORMAL (ref 9–20)
CALCIUM SERPL-MCNC: 9.8 MG/DL (ref 8.6–10.4)
CALCIUM SERPL-MCNC: 9.8 MG/DL (ref 8.6–10.4)
CASTS UA: ABNORMAL /LPF
CHLORIDE BLD-SCNC: 106 MMOL/L (ref 98–107)
CHLORIDE BLD-SCNC: 106 MMOL/L (ref 98–107)
CHOLESTEROL, FASTING: 111 MG/DL
CHOLESTEROL/HDL RATIO: 2.8
CO2: 27 MMOL/L (ref 20–31)
CO2: 28 MMOL/L (ref 20–31)
COLOR: ABNORMAL
COMMENT UA: ABNORMAL
CREAT SERPL-MCNC: 1.59 MG/DL (ref 0.5–0.9)
CREAT SERPL-MCNC: 1.61 MG/DL (ref 0.5–0.9)
CRYSTALS, UA: ABNORMAL /HPF
CRYSTALS, UA: ABNORMAL /HPF
DIFFERENTIAL TYPE: ABNORMAL
EOSINOPHILS RELATIVE PERCENT: 4 % (ref 0–4)
EPITHELIAL CELLS UA: ABNORMAL /HPF
GFR AFRICAN AMERICAN: 39 ML/MIN
GFR AFRICAN AMERICAN: 39 ML/MIN
GFR NON-AFRICAN AMERICAN: 32 ML/MIN
GFR NON-AFRICAN AMERICAN: 33 ML/MIN
GFR SERPL CREATININE-BSD FRML MDRD: ABNORMAL ML/MIN/{1.73_M2}
GLUCOSE BLD-MCNC: 110 MG/DL (ref 70–99)
GLUCOSE BLD-MCNC: 114 MG/DL (ref 70–99)
GLUCOSE URINE: NEGATIVE
HCT VFR BLD CALC: 36.8 % (ref 36–46)
HDLC SERPL-MCNC: 40 MG/DL
HEMOGLOBIN: 12.1 G/DL (ref 12–16)
IMMATURE GRANULOCYTES: ABNORMAL %
KETONES, URINE: ABNORMAL
LDL CHOLESTEROL: 48 MG/DL (ref 0–130)
LEUKOCYTE ESTERASE, URINE: ABNORMAL
LYMPHOCYTES # BLD: 21 % (ref 24–44)
MAGNESIUM: 1.8 MG/DL (ref 1.6–2.6)
MCH RBC QN AUTO: 29.6 PG (ref 26–34)
MCHC RBC AUTO-ENTMCNC: 32.9 G/DL (ref 31–37)
MCV RBC AUTO: 90 FL (ref 80–100)
MONOCYTES # BLD: 6 % (ref 1–7)
MUCUS: ABNORMAL
NITRITE, URINE: POSITIVE
NRBC AUTOMATED: ABNORMAL PER 100 WBC
OTHER OBSERVATIONS UA: ABNORMAL
PDW BLD-RTO: 15.3 % (ref 11.5–14.9)
PH UA: 5.5 (ref 5–8)
PHOSPHORUS: 3.6 MG/DL (ref 2.6–4.5)
PLATELET # BLD: 209 K/UL (ref 150–450)
PLATELET ESTIMATE: ABNORMAL
PMV BLD AUTO: 8.1 FL (ref 6–12)
POTASSIUM SERPL-SCNC: 4.7 MMOL/L (ref 3.7–5.3)
POTASSIUM SERPL-SCNC: 5 MMOL/L (ref 3.7–5.3)
PROTEIN UA: ABNORMAL
RBC # BLD: 4.09 M/UL (ref 4–5.2)
RBC # BLD: ABNORMAL 10*6/UL
RBC UA: ABNORMAL /HPF
RENAL EPITHELIAL, UA: ABNORMAL /HPF
SEG NEUTROPHILS: 68 % (ref 36–66)
SEGMENTED NEUTROPHILS ABSOLUTE COUNT: 4.4 K/UL (ref 1.3–9.1)
SODIUM BLD-SCNC: 143 MMOL/L (ref 135–144)
SODIUM BLD-SCNC: 144 MMOL/L (ref 135–144)
SPECIFIC GRAVITY UA: 1.02 (ref 1–1.03)
T3 FREE: 2.72 PG/ML (ref 2.02–4.43)
THYROXINE, FREE: 1.22 NG/DL (ref 0.93–1.7)
TOTAL PROTEIN: 7.2 G/DL (ref 6.4–8.3)
TRICHOMONAS: ABNORMAL
TRIGLYCERIDE, FASTING: 114 MG/DL
TSH SERPL DL<=0.05 MIU/L-ACNC: 2.47 MIU/L (ref 0.3–5)
TURBIDITY: CLEAR
URINE HGB: NEGATIVE
UROBILINOGEN, URINE: NORMAL
VLDLC SERPL CALC-MCNC: ABNORMAL MG/DL (ref 1–30)
WBC # BLD: 6.5 K/UL (ref 3.5–11)
WBC # BLD: ABNORMAL 10*3/UL
WBC UA: ABNORMAL /HPF
YEAST: ABNORMAL

## 2021-08-17 PROCEDURE — 84443 ASSAY THYROID STIM HORMONE: CPT

## 2021-08-17 PROCEDURE — 80053 COMPREHEN METABOLIC PANEL: CPT

## 2021-08-17 PROCEDURE — 83036 HEMOGLOBIN GLYCOSYLATED A1C: CPT

## 2021-08-17 PROCEDURE — 36415 COLL VENOUS BLD VENIPUNCTURE: CPT

## 2021-08-17 PROCEDURE — 80048 BASIC METABOLIC PNL TOTAL CA: CPT

## 2021-08-17 PROCEDURE — 84100 ASSAY OF PHOSPHORUS: CPT

## 2021-08-17 PROCEDURE — 85025 COMPLETE CBC W/AUTO DIFF WBC: CPT

## 2021-08-17 PROCEDURE — 84481 FREE ASSAY (FT-3): CPT

## 2021-08-17 PROCEDURE — 80061 LIPID PANEL: CPT

## 2021-08-17 PROCEDURE — 83735 ASSAY OF MAGNESIUM: CPT

## 2021-08-17 PROCEDURE — 81001 URINALYSIS AUTO W/SCOPE: CPT

## 2021-08-17 PROCEDURE — 84439 ASSAY OF FREE THYROXINE: CPT

## 2021-08-18 LAB
ESTIMATED AVERAGE GLUCOSE: 117 MG/DL
HBA1C MFR BLD: 5.7 % (ref 4–6)

## 2021-10-12 DIAGNOSIS — K58.1 IRRITABLE BOWEL SYNDROME WITH CONSTIPATION: ICD-10-CM

## 2021-10-12 RX ORDER — LINACLOTIDE 290 UG/1
CAPSULE, GELATIN COATED ORAL
Qty: 30 CAPSULE | Refills: 0 | Status: SHIPPED | OUTPATIENT
Start: 2021-10-12 | End: 2021-11-09

## 2021-11-09 ENCOUNTER — OFFICE VISIT (OUTPATIENT)
Dept: GASTROENTEROLOGY | Age: 66
End: 2021-11-09
Payer: MEDICARE

## 2021-11-09 VITALS
HEART RATE: 59 BPM | OXYGEN SATURATION: 98 % | BODY MASS INDEX: 40.57 KG/M2 | DIASTOLIC BLOOD PRESSURE: 71 MMHG | SYSTOLIC BLOOD PRESSURE: 138 MMHG | TEMPERATURE: 97.4 F | WEIGHT: 259 LBS

## 2021-11-09 DIAGNOSIS — K58.1 IRRITABLE BOWEL SYNDROME WITH CONSTIPATION: ICD-10-CM

## 2021-11-09 DIAGNOSIS — K56.699 SIGMOID STRICTURE (HCC): ICD-10-CM

## 2021-11-09 DIAGNOSIS — K21.9 GASTROESOPHAGEAL REFLUX DISEASE, UNSPECIFIED WHETHER ESOPHAGITIS PRESENT: ICD-10-CM

## 2021-11-09 DIAGNOSIS — K22.70 BARRETT'S ESOPHAGUS WITHOUT DYSPLASIA: Primary | ICD-10-CM

## 2021-11-09 DIAGNOSIS — E66.01 SEVERE OBESITY (BMI 35.0-39.9) WITH COMORBIDITY (HCC): ICD-10-CM

## 2021-11-09 DIAGNOSIS — K57.30 SIGMOID DIVERTICULOSIS: ICD-10-CM

## 2021-11-09 PROCEDURE — 99214 OFFICE O/P EST MOD 30 MIN: CPT | Performed by: INTERNAL MEDICINE

## 2021-11-09 RX ORDER — LINACLOTIDE 290 UG/1
CAPSULE, GELATIN COATED ORAL
Qty: 30 CAPSULE | Refills: 0 | Status: SHIPPED | OUTPATIENT
Start: 2021-11-09 | End: 2021-12-10

## 2021-11-09 ASSESSMENT — ENCOUNTER SYMPTOMS
DIARRHEA: 1
ABDOMINAL DISTENTION: 1
ABDOMINAL PAIN: 1
CONSTIPATION: 1

## 2021-11-09 NOTE — PROGRESS NOTES
GI OFFICE FOLLOW UP    Stacy Wells is a 72 y.o. female evaluated via on 11/9/2021. Consent:  She and/or health care decision maker is aware that that she may receive a bill for this telephone service, depending on her insurance coverage, and has provided verbal consent to proceed: YES      INTERVAL HISTORY:   No referring provider defined for this encounter. Chief Complaint   Patient presents with    Medication Refill     Patient here for med refill and to follow up form hospital  / IBS and GERD       1. Guerrero's esophagus without dysplasia    2. Severe obesity (BMI 35.0-39. 9) with comorbidity ([address]    3. Sigmoid stricture ([address]    4. Sigmoid diverticulosis    5. Gastroesophageal reflux disease, unspecified whether esophagitis present      Seen in my office as a  F/u after 1 year  She has history for above problems  Last Td was done as 2019    She has history for significant sigmoid stricture had a colonoscopy done in the past with extensive diverticulosis no gross pathology was noted repeat colon was recommended for 2 to 3 years    Patient has been complaining of some abdominal pains, off and on cramping  Also complains of abdominal bloating and gas  Has off and on nausea without any sig vomiting  Has some alternating constipation and diarrhea  Has no weight loss  Has some anxiety issues    She has been taking proton pump inhibitor therapy    Currently denies any smoking alcohol abuse illicit drug usage has no known family history for colon cancer            HISTORY OF PRESENT ILLNESS: Missy Radford is a 72 y.o. female with a past history remarkable for , referred for evaluation of   Chief Complaint   Patient presents with    Medication Refill     Patient here for med refill and to follow up form hospital  / IBS and GERD   .     Past Medical,Family, and Social History reviewed and does contribute to the patient presenting condition. Patient's PMH/PSH,SH,PSYCH Hx, MEDs, ALLERGIES, and ROS were all reviewed and updated in the appropriate sections. PAST MEDICAL HISTORY:  Past Medical History:   Diagnosis Date    Anemia     Arthritis     back    Guerrero esophagus 11/01/2018    Bruises easily     CAD S/P percutaneous coronary angioplasty 6/8/2017    cardiac stent x1 5-29-09    Chronic back pain, KNEE PAIN AND NECK PAIN.  CKD (chronic kidney disease) stage 3, GFR 30-59 ml/min ([facility])     Colon polyp 04/16/2019    hyperplastic polyp    DDD (degenerative disc disease)     DDD (degenerative disc disease)     Depression     Diverticulitis     Fibromyalgia     Full dentures     H/O dilation and curettage 07/17/2017    History of blood transfusion     History of myocardial infarction 5/09    Hx of blood clots     left kidney    Hypercholesterolemia     Hypertension     IBS (irritable bowel syndrome)     Neuropathy     Nocturia     Obesity     Post-menopausal bleeding     Short of breath on exertion     Spinal stenosis     Thyroid nodule     Tobacco abuse     'quit 3 yrs ago. \"    Type 2 diabetes mellitus without complication, without long-term current use of insulin ([address] 6/8/2017    Wears glasses        Past Surgical History:   Procedure Laterality Date    BREAST BIOPSY  2005    Left---Benign    CARDIAC CATHETERIZATION  06/20/2016    no intervention    COLONOSCOPY  2011    diverticulitis    COLONOSCOPY N/A 4/16/2019    hyperplastic polyp    CORONARY ANGIOPLASTY WITH STENT PLACEMENT  05/2009    X 1    DILATION AND CURETTAGE OF UTERUS N/A 7/17/2017    DILATATION AND CURETTAGE OPERATIVE HYSTEROSCOPY WITH MYOSURE, POLYPECTOMY, MYOMECTOMY performed by Maeve Iverson DO at [address] N/A 7/25/2019    DILATATION AND CURETTAGE HYSTEROSCOPY performed by Maeve Iverson DO at [facility] HYSTEROSCOPY  12/16/13    Operative Hscope with endometrial sampling, polypectomy & myomectomy [facility]    HYSTEROSCOPY  07/17/2017    ID OFFICE/OUTPT VISIT,PROCEDURE ONLY N/A 1/10/2018    DILATATION AND CURETTAGE HYSTEROSCOPY performed by Mariluz Roper DO at [address] [facility]  NEEDLE BIOPSY      TONSILLECTOMY  age 22    TOTAL KNEE ARTHROPLASTY Left 08/08/2017    TOTAL KNEE ARTHROPLASTY Right 11/20/2018    TUBAL LIGATION  1979    UPPER GASTROINTESTINAL ENDOSCOPY  11/01/2018    [facility]       CURRENT MEDICATIONS:    Current Outpatient Medications:     LINZESS 290 MCG CAPS capsule, TAKE 1 CAPSULE BY MOUTH ONCE DAILY IN THE MORNING BEFORE BREAKFAST, Disp: 30 capsule, Rfl: 0    cetirizine (ZYRTEC) 10 MG tablet, Take 1 tablet by mouth nightly, Disp: , Rfl:     vitamin D (CHOLECALCIFEROL) 25 MCG (1000 UT) TABS tablet, Take 1,000 Units by mouth daily, Disp: , Rfl:     co-enzyme Q-10 30 MG capsule, Take 30 mg by mouth 3 times daily, Disp: , Rfl:     dronedarone hcl (MULTAQ) 400 MG TABS, Take 400 mg by mouth 2 times daily (with meals), Disp: , Rfl:     clotrimazole-betamethasone (LOTRISONE) 1-0.05 % cream, APPLY  CREAM TOPICALLY TO AFFECTED AREA TWICE DAILY FOR 4 DAYS AND THEN ONCE DAILY FOR 3 DAYS, Disp: 15 g, Rfl: 0    tiZANidine (ZANAFLEX) 2 MG tablet, TAKE 1 TABLET BY MOUTH EVERY 8 HOURS AS NEEDED FOR PAIN (Patient taking differently: 4 mg ), Disp: 90 tablet, Rfl: 0    Multiple Vitamin (MULTI VITAMIN DAILY PO), Take by mouth, Disp: , Rfl:     fluticasone (FLONASE) 50 MCG/ACT nasal spray, 1 spray by Each Nostril route daily as needed for Rhinitis , Disp: , Rfl:     Probiotic Product (PROBIOTIC ADVANCED PO), Take by mouth daily, Disp: , Rfl:     albuterol sulfate HFA (PROVENTIL HFA) 108 (90 Base) MCG/ACT inhaler, Inhale 2 puffs into the lungs , Disp: , Rfl:     Biotin 10 MG CAPS, Take by mouth, Disp: , Rfl:     vitamin E 400 UNIT capsule, Take 400 Units by mouth 2 times daily, Disp: , Rfl:     pantoprazole (PROTONIX) 40 MG tablet, Take 40 mg by mouth daily, Disp: , Rfl:     Cyanocobalamin (VITAMIN B 12 PO), Take 500 mg by mouth, Disp: , Rfl:     ferrous sulfate 325 (65 Fe) MG EC tablet, Take 325 mg by mouth 3 times daily (with meals), Disp: , Rfl:     isosorbide mononitrate (IMDUR) 60 MG extended release tablet, Take 90 mg by mouth daily 1 1/2 tablet daily, Disp: , Rfl:     HYDROcodone-acetaminophen (NORCO) 5-325 MG per tablet, Take 1 tablet by mouth every 12 hours as needed for Pain., Disp: , Rfl:     DULoxetine (CYMBALTA) 30 MG extended release capsule, Take 60 mg by mouth 2 times daily , Disp: , Rfl:     nitroGLYCERIN (NITROSTAT) 0.4 MG SL tablet, Place 0.4 mg under the tongue every 5 minutes as needed for Chest pain up to max of 3 total doses. If no relief after 1 dose, call 911. , Disp: , Rfl:     metoprolol (LOPRESSOR) 50 MG tablet, Take 50 mg by mouth 2 times daily. , Disp: , Rfl:     atorvastatin (LIPITOR) 80 MG tablet, Take 80 mg by mouth daily.   , Disp: , Rfl:     MYRBETRIQ 25 MG TB24, TAKE 1 TABLET BY MOUTH ONCE DAILY (Patient not taking: Reported on 11/9/2021), Disp: , Rfl:     calcium citrate-vitamin D (CITRICAL + D) 315-250 MG-UNIT TABS per tablet, Take 1 tablet by mouth 2 times daily (with meals) (Patient not taking: Reported on 11/9/2021), Disp: , Rfl:     ONETOUCH VERIO strip, USE 1 STRIP TO CHECK GLUCOSE TWICE DAILY, Disp: , Rfl:     Lancets (ONETOUCH DELICA PLUS [id number]) MISC, USE 1 TO CHECK GLUCOSE 4 TIMES DAILY, Disp: , Rfl:     oxybutynin (DITROPAN) 5 MG tablet, Take 5 mg by mouth 2 times daily (Patient not taking: Reported on 11/9/2021), Disp: , Rfl:     metFORMIN (GLUCOPHAGE) 500 MG tablet, Take 500 mg by mouth daily (with breakfast)  (Patient not taking: Reported on 11/9/2021), Disp: , Rfl:     ALLERGIES:   Allergies   Allergen Reactions    Pcn [Penicillins] Hives    Amlodipine      Leg swelling    Codeine Other (See Comments)     Stomach cramps    Gabapentin Other (See Comments) Causes hands shake and unable to hold anything    Lyrica [Pregabalin] Other (See Comments)     \"trouble concentrating\"    Sulfa Antibiotics Other (See Comments)     Cramps    Other Rash     white    Oxycodone-Acetaminophen Itching and Rash       FAMILY HISTORY:       Problem Relation Age of Onset    Heart Attack Father     Diabetes Mother     Other Mother         brain bleed    Other Daughter         blood clotting disorder    Breast Cancer Neg Hx     Cancer Neg Hx     Colon Cancer Neg Hx     Eclampsia Neg Hx     Hypertension Neg Hx     Ovarian Cancer Neg Hx      Labor Neg Hx     Spont Abortions Neg Hx     Stroke Neg Hx     Heart Disease Neg Hx          SOCIAL HISTORY:   Social History     Socioeconomic History    Marital status:      Spouse name: Not on file    Number of children: Not on file    Years of education: Not on file    Highest education level: Not on file   Occupational History    Not on file   Tobacco Use    Smoking status: Former Smoker     Packs/day: 0.50     Years: 40.00     Pack years: 20.00     Quit date: 2012     Years since quittin.8    Smokeless tobacco: Never Used   Vaping Use    Vaping Use: Never used   Substance and Sexual Activity    Alcohol use: Not Currently    Drug use: No    Sexual activity: Never     Birth control/protection: Post-menopausal   Other Topics Concern    Not on file   Social History Narrative    Not on file     Social Determinants of Health     Financial Resource Strain:     Difficulty of Paying Living Expenses: Not on file   Food Insecurity:     Worried About Running Out of Food in the Last Year: Not on file    [place] of Food in the Last Year: Not on file   Transportation Needs:     Lack of Transportation (Medical): Not on file    Lack of Transportation (Non-Medical):  Not on file   Physical Activity:     Days of Exercise per Week: Not on file    Minutes of Exercise per Session: Not on file   Stress:     Feeling of Stress : Not on file   Social Connections:     Frequency of Communication with Friends and Family: Not on file    Frequency of Social Gatherings with Friends and Family: Not on file    Attends Judaism Services: Not on file    Active Member of Clubs or Organizations: Not on file    Attends Club or Organization Meetings: Not on file    Marital Status: Not on file   Intimate Partner Violence:     Fear of Current or Ex-Partner: Not on file    Emotionally Abused: Not on file    Physically Abused: Not on file    Sexually Abused: Not on file   Housing Stability:     Unable to Pay for Housing in the Last Year: Not on file    Number of [place] in the Last Year: Not on file    Unstable Housing in the Last Year: Not on file         REVIEW OF SYSTEMS:         Review of Systems   Constitutional: Positive for fatigue. Gastrointestinal: Positive for abdominal distention, abdominal pain, constipation and diarrhea. Neurological: Positive for dizziness and headaches. Hematological: Bruises/bleeds easily. PHYSICAL EXAMINATION: Vital signs reviewed per the nursing documentation. /71   Pulse 59   Temp 97.4 °F (36.3 °C)   Wt 259 lb (117.5 kg)   LMP 06/29/2002 (Within Months)   SpO2 98%   BMI 40.57 kg/m²   Body mass index is 40.57 kg/m². Physical Exam  Nursing note reviewed. Constitutional:       Appearance: She is well-developed. Comments: Anxious    HENT:      Head: Normocephalic and atraumatic. Eyes:      Conjunctiva/sclera: Conjunctivae normal.      Pupils: Pupils are equal, round, and reactive to light. Cardiovascular:      Heart sounds: Normal heart sounds. Pulmonary:      Effort: Pulmonary effort is normal.      Breath sounds: Normal breath sounds. Abdominal:      General: Bowel sounds are normal.      Palpations: Abdomen is soft.       Comments: Mild  TENDER, NON DISTENTED  LIVER SPLEEN AND HERNIAS ARE NOT  PALPABLE  BOWEL SOUNDS ARE POSITIVE agreement to this plan. Cont PPI    Pt was discussed in detail about the possible side effects of proton pump inhibiter therapy. She was explained about the possibility of calcium and magnesium malabsorption and was advised to start taking calcium supplements with Vit D. Some over the counter regimens were explained to patient. Some dietary advices were also given. She has verbalized understanding and agreement to this. Pt was advised in detail about some life style and dietary modifications. She was advised about avoidance of caffeine, nicotine and chocolate. Pt was also told to stay away from any kind of fast foods, soda pops. She was also advised to avoid lots of spices, grease and fried food etc.     Instructions were also given about trying to arrange the timing, quality and quantity of food. Instructions were given about using ample amount of fiber including dietary and supplemental fiber either metamucil, bennafiber or citrucell etc.  Pt was advised about drinking ample amount of water without any colors or chemicals. Stress was given about regular exercise. Pt has verbalized understanding and agreement to these modifications. The patient was instructed to start taking some OTC Probiotics products   These are available over the counter at the Pharmacy stores and [employer]  He was explained about the beneficial effects they have in the GI track  They will help to establish the good bacterial rosy and will help with the digestion and bowel movements  The patient has verbalized understanding and agreement to this plan        I communicated with the patient and/or health care decision maker about   Details of this discussion including any medical advice provided:YES      I affirm this is a Patient Initiated Episode with an Established Patient who has not had a related appointment within my department in the past 7 days or scheduled within the next 24 hours.     Total Time: minutes: 21-30 minutes    Note: not billable if this call serves to triage the patient into an appointment for the relevant concern      Thank you for allowing me to participate in the care of Ms. Barby Michel. For any further questions please do not hesitate to contact me. I have reviewed and agree with the ROS entered by the MA/LPN.          Danielle Braswell MD, [facility]  Board Certified in Gastroenterology and [address] [place] [address] Gastroenterology  Office #: [phone]

## 2021-12-10 ENCOUNTER — HOSPITAL ENCOUNTER (OUTPATIENT)
Dept: PREADMISSION TESTING | Age: 66
Discharge: HOME | End: 2021-12-14
Payer: MEDICARE

## 2021-12-10 VITALS
DIASTOLIC BLOOD PRESSURE: 69 MMHG | WEIGHT: 260 LBS | TEMPERATURE: 96.8 F | OXYGEN SATURATION: 98 % | HEART RATE: 59 BPM | RESPIRATION RATE: 16 BRPM | SYSTOLIC BLOOD PRESSURE: 126 MMHG | HEIGHT: 67 IN | BODY MASS INDEX: 40.81 KG/M2

## 2021-12-10 DIAGNOSIS — K58.1 IRRITABLE BOWEL SYNDROME WITH CONSTIPATION: ICD-10-CM

## 2021-12-10 LAB
ABSOLUTE EOS #: 0.3 K/UL (ref 0–0.4)
ABSOLUTE IMMATURE GRANULOCYTE: ABNORMAL K/UL (ref 0–0.3)
ABSOLUTE LYMPH #: 1.4 K/UL (ref 1–4.8)
ABSOLUTE MONO #: 0.7 K/UL (ref 0.1–1.3)
ANION GAP SERPL CALCULATED.3IONS-SCNC: 7 MMOL/L (ref 9–17)
BASOPHILS # BLD: 1 % (ref 0–2)
BASOPHILS ABSOLUTE: 0.1 K/UL (ref 0–0.2)
BUN BLDV-MCNC: 17 MG/DL (ref 8–23)
BUN/CREAT BLD: ABNORMAL (ref 9–20)
CALCIUM SERPL-MCNC: 9.8 MG/DL (ref 8.6–10.4)
CHLORIDE BLD-SCNC: 104 MMOL/L (ref 98–107)
CO2: 31 MMOL/L (ref 20–31)
CREAT SERPL-MCNC: 1.33 MG/DL (ref 0.5–0.9)
DIFFERENTIAL TYPE: ABNORMAL
EOSINOPHILS RELATIVE PERCENT: 3 % (ref 0–4)
GFR AFRICAN AMERICAN: 48 ML/MIN
GFR NON-AFRICAN AMERICAN: 40 ML/MIN
GFR SERPL CREATININE-BSD FRML MDRD: ABNORMAL ML/MIN/{1.73_M2}
GFR SERPL CREATININE-BSD FRML MDRD: ABNORMAL ML/MIN/{1.73_M2}
GLUCOSE BLD-MCNC: 186 MG/DL (ref 70–99)
HCT VFR BLD CALC: 39.2 % (ref 36–46)
HEMOGLOBIN: 13 G/DL (ref 12–16)
IMMATURE GRANULOCYTES: ABNORMAL %
LYMPHOCYTES # BLD: 14 % (ref 24–44)
MCH RBC QN AUTO: 29.5 PG (ref 26–34)
MCHC RBC AUTO-ENTMCNC: 33.2 G/DL (ref 31–37)
MCV RBC AUTO: 88.9 FL (ref 80–100)
MONOCYTES # BLD: 7 % (ref 1–7)
NRBC AUTOMATED: ABNORMAL PER 100 WBC
PDW BLD-RTO: 15.2 % (ref 11.5–14.9)
PLATELET # BLD: 217 K/UL (ref 150–450)
PLATELET ESTIMATE: ABNORMAL
PMV BLD AUTO: 8.6 FL (ref 6–12)
POTASSIUM SERPL-SCNC: 4.2 MMOL/L (ref 3.7–5.3)
RBC # BLD: 4.41 M/UL (ref 4–5.2)
RBC # BLD: ABNORMAL 10*6/UL
SEG NEUTROPHILS: 75 % (ref 36–66)
SEGMENTED NEUTROPHILS ABSOLUTE COUNT: 7.3 K/UL (ref 1.3–9.1)
SODIUM BLD-SCNC: 142 MMOL/L (ref 135–144)
WBC # BLD: 9.7 K/UL (ref 3.5–11)
WBC # BLD: ABNORMAL 10*3/UL

## 2021-12-10 PROCEDURE — 85025 COMPLETE CBC W/AUTO DIFF WBC: CPT

## 2021-12-10 PROCEDURE — 80048 BASIC METABOLIC PNL TOTAL CA: CPT

## 2021-12-10 PROCEDURE — 36415 COLL VENOUS BLD VENIPUNCTURE: CPT

## 2021-12-10 PROCEDURE — 93005 ELECTROCARDIOGRAM TRACING: CPT

## 2021-12-10 RX ORDER — ASPIRIN 81 MG/1
81 TABLET ORAL DAILY
COMMUNITY

## 2021-12-10 RX ORDER — LINACLOTIDE 290 UG/1
CAPSULE, GELATIN COATED ORAL
Qty: 30 CAPSULE | Refills: 0 | Status: SHIPPED | OUTPATIENT
Start: 2021-12-10 | End: 2022-01-20

## 2021-12-10 ASSESSMENT — ENCOUNTER SYMPTOMS
COUGH: 0
SHORTNESS OF BREATH: 1
SORE THROAT: 0
BACK PAIN: 1
WHEEZING: 0

## 2021-12-10 NOTE — PROGRESS NOTES
Dr. Octaviano Gordon, anesthesia, was contacted and informed of patient's history and planned surgery. Cardiac clearance and written proof of Pacemaker interrogation within 6 months of surgery requested. Surgery scheduling will notify Dr. Kate Mars office who will be responsible for making sure the clearance is obtained and is in the chart for surgery.

## 2021-12-10 NOTE — H&P
HISTORY and [address]       NAME:  Ashley Dior  MRN: [id number]   YOB: 1955   Date: 12/10/2021   Age: 77 y.o. Gender: female       COMPLAINT AND PRESENT HISTORY:   Ashley Dior is 77 y.o. female, undergoing preadmission testing for EGD. Pt has had a previous EGD last in 2018. History of Guerrero's esophagus, GERD, diverticulosis, sigmoid stricture. Takes Protonix with moderate relief. She takes Tums for breakthrough symptoms. Pt has been experiencing RLQ cramping abdominal pain that is intermittent in nature. Pt c/o bloating and gas. Pt has intermittent nausea without associated vomiting. Pt has alternating diarrhea and constipation. Pt has occasional dark stools but denies hematochezia. Denies decreased appetite and unexpected weight loss. She reports unexpected weight gain related to fluid retention secondary to CHF. She does take diuretics. Pt c/o SOB with exertion. She does weigh herself daily. PMHx includes:    DM: She is not currently taking any medications for DM. Sees Dr. Sumeet Singh for endocrinology. Lab Results   Component Value Date    LABA1C 5.7 08/17/2021     Lab Results   Component Value Date     08/17/2021     CAD / MI / HTN / Paroxysmal atrial fibrillation / Chronic diastolic heart failure: Pt s/p coronary angioplasty x3 in the past with stent placement x 1 in 2009. History of SSS s/p pacemaker placement in June, 2021. Takes Lopressor, Lipitor, Imdur, Lasix, aspirin. Takes nitroglycerin prn with last use approximately one month ago. Pt follows sodium and fluid restrictions per cardiology. Sees Dr. Gene Renee for cardiology. She has an upcoming appointment prior to scheduled procedure. Takes Eliquis for anticoagulation.      BP Readings from Last 3 Encounters:   12/10/21 126/69   11/09/21 138/71   08/04/21 128/68     Echocardiogram completed on 05/25/2021:    Left Ventricle: Systolic function is normal with an ejection fraction   of 55-60%.   Mitral Valve: There is mild regurgitation. There is no evidence of   mitral valve stenosis.   Tricuspid Valve: There is mild regurgitation. There is no evidence of   tricuspid valve stenosis. Stress test completed on 08/24/2021:  Findings: There are anterior and lateral wall perfusion defects also involving the apex without significant redistribution. On gated analysis, there is global hypokinetic motion. LVEF is 44 %. TID is 0.93. Impression:   1. Intermediate risk for a cardiovascular event. CKD: Stage 3. Sees Dr. Nicole Medina for nephrology. Lab Results   Component Value Date    CREATININE 1.40 11/04/2021     Denies personal and family history of complications of anesthesia. PAST MEDICAL HISTORY     Past Medical History:   Diagnosis Date    Anemia     Arthritis     back    Atrial fibrillation (HCC)     on Eliquis for    Guerrero esophagus 11/01/2018    Bruises easily     CAD S/P percutaneous coronary angioplasty 6/8/2017    cardiac stent x1 5-29-09    CHF (congestive heart failure) ([facility] 08/23/2021    [facility].  Chronic back pain, KNEE PAIN AND NECK PAIN.      CKD (chronic kidney disease) stage 3, GFR 30-59 ml/min (HCC)     Colon polyp 04/16/2019    hyperplastic polyp    COVID-19 vaccine administered 12-,4-,328-2021    Pfizer    DDD (degenerative disc disease)     DDD (degenerative disc disease)     Depression     Diverticulitis     Fibromyalgia     Full dentures     H/O dilation and curettage 07/17/2017    History of blood transfusion     no problems    History of myocardial infarction 5/09    Hx of blood clots     left kidney    Hypercholesterolemia     Hypertension     IBS (irritable bowel syndrome)     Neuropathy     Nocturia     Obesity     Post-menopausal bleeding     Short of breath on exertion     Sick sinus syndrome ([facility]     Pacemaker placed 6-    Spinal stenosis     Socioeconomic History    Marital status:      Spouse name: None    Number of children: None    Years of education: None    Highest education level: None   Occupational History    None   Tobacco Use    Smoking status: Former Smoker     Packs/day: 0.50     Years: 40.00     Pack years: 20.00     Quit date: 2014     Years since quittin.3    Smokeless tobacco: Never Used   Vaping Use    Vaping Use: Never used   Substance and Sexual Activity    Alcohol use: Not Currently    Drug use: No    Sexual activity: Never     Birth control/protection: Post-menopausal   Other Topics Concern    None   Social History Narrative    None     Social Determinants of Health     Financial Resource Strain:     Difficulty of Paying Living Expenses: Not on file   Food Insecurity:     Worried About Running Out of Food in the Last Year: Not on file    [place] of Food in the Last Year: Not on file   Transportation Needs:     Lack of Transportation (Medical): Not on file    Lack of Transportation (Non-Medical):  Not on file   Physical Activity:     Days of Exercise per Week: Not on file    Minutes of Exercise per Session: Not on file   Stress:     Feeling of Stress : Not on file   Social Connections:     Frequency of Communication with Friends and Family: Not on file    Frequency of Social Gatherings with Friends and Family: Not on file    Attends Zoroastrian Services: Not on file    Active Member of [address] [place] [address] or Organizations: Not on file    Attends Club or Organization Meetings: Not on file    Marital Status: Not on file   Intimate Partner Violence:     Fear of Current or Ex-Partner: Not on file    Emotionally Abused: Not on file    Physically Abused: Not on file    Sexually Abused: Not on file   Housing Stability:     Unable to Pay for Housing in the Last Year: Not on file    Number of [place] in the Last Year: Not on file    Unstable Housing in the Last Year: Not on file        REVIEW OF SYSTEMS      Allergies   Allergen Reactions    Adhesive Tape Rash    Pcn [Penicillins] Hives    Amlodipine      Leg swelling    Codeine Other (See Comments)     Stomach cramps    Gabapentin Other (See Comments)     Causes hands shake and unable to hold anything    Lyrica [Pregabalin] Other (See Comments)     \"trouble concentrating\"    Sulfa Antibiotics Other (See Comments)     Cramps    Other Rash     white    Oxycodone-Acetaminophen Itching and Rash       Current Outpatient Medications on File Prior to Encounter   Medication Sig Dispense Refill    apixaban (ELIQUIS) 5 MG TABS tablet Take 5 mg by mouth 2 times daily      aspirin 81 MG EC tablet Take 81 mg by mouth daily      LINZESS 290 MCG CAPS capsule TAKE 1 CAPSULE BY MOUTH ONCE DAILY IN THE MORNING BEFORE BREAKFAST 30 capsule 0    cetirizine (ZYRTEC) 10 MG tablet Take 1 tablet by mouth nightly      vitamin D (CHOLECALCIFEROL) 25 MCG (1000 UT) TABS tablet Take 1,000 Units by mouth daily      co-enzyme Q-10 30 MG capsule Take 30 mg by mouth 3 times daily      dronedarone hcl (MULTAQ) 400 MG TABS Take 400 mg by mouth 2 times daily (with meals)      clotrimazole-betamethasone (LOTRISONE) 1-0.05 % cream APPLY  CREAM TOPICALLY TO AFFECTED AREA TWICE DAILY FOR 4 DAYS AND THEN ONCE DAILY FOR 3 DAYS 15 g 0    tiZANidine (ZANAFLEX) 2 MG tablet TAKE 1 TABLET BY MOUTH EVERY 8 HOURS AS NEEDED FOR PAIN (Patient taking differently: 4 mg ) 90 tablet 0    Multiple Vitamin (MULTI VITAMIN DAILY PO) Take by mouth      calcium citrate-vitamin D (CITRICAL + D) 315-250 MG-UNIT TABS per tablet Take 1 tablet by mouth 2 times daily (with meals)       ONETOUCH VERIO strip USE 1 STRIP TO CHECK GLUCOSE TWICE DAILY      Lancets (ONETOUCH DELICA PLUS [id number]) MISC USE 1 TO CHECK GLUCOSE 4 TIMES DAILY      fluticasone (FLONASE) 50 MCG/ACT nasal spray 1 spray by Each Nostril route daily as needed for Rhinitis       Probiotic Product (PROBIOTIC ADVANCED PO) Take by mouth daily      albuterol sulfate HFA (PROVENTIL HFA) 108 (90 Base) MCG/ACT inhaler Inhale 2 puffs into the lungs       Biotin 10 MG CAPS Take by mouth      vitamin E 400 UNIT capsule Take 400 Units by mouth 2 times daily      pantoprazole (PROTONIX) 40 MG tablet Take 40 mg by mouth daily      Cyanocobalamin (VITAMIN B 12 PO) Take 500 mg by mouth      ferrous sulfate 325 (65 Fe) MG EC tablet Take 325 mg by mouth 3 times daily (with meals)      isosorbide mononitrate (IMDUR) 60 MG extended release tablet Take 90 mg by mouth daily 1 1/2 tablet daily      HYDROcodone-acetaminophen (NORCO) 5-325 MG per tablet Take 1 tablet by mouth every 12 hours as needed for Pain.  DULoxetine (CYMBALTA) 30 MG extended release capsule Take 60 mg by mouth 2 times daily       nitroGLYCERIN (NITROSTAT) 0.4 MG SL tablet Place 0.4 mg under the tongue every 5 minutes as needed for Chest pain up to max of 3 total doses. If no relief after 1 dose, call 911.  metoprolol (LOPRESSOR) 50 MG tablet Take 50 mg by mouth 2 times daily.  atorvastatin (LIPITOR) 80 MG tablet Take 80 mg by mouth daily. No current facility-administered medications on file prior to encounter. Review of Systems   Constitutional: Positive for unexpected weight change. Negative for appetite change, chills and fever. HENT: Negative for congestion and sore throat. Full upper and lower dentures. Eyes: Positive for visual disturbance (Glasses). Respiratory: Positive for shortness of breath (with exertion). Negative for cough and wheezing. Cardiovascular: Positive for palpitations (Occasional) and leg swelling. Negative for chest pain. Gastrointestinal:        See HPI   Genitourinary: Positive for frequency (On diuretic). Negative for dysuria and hematuria. Pt has urinary incontinence. Pt sees urology   Musculoskeletal: Positive for arthralgias, back pain and neck pain. Skin: Negative for rash and wound. Neurological: Positive for dizziness (Occasionally), light-headedness (Occasionally) and headaches (Occasionally). Negative for speech difficulty. Hematological: Bruises/bleeds easily. GENERAL PHYSICAL EXAM     Vitals: /69   Pulse 59   Temp 96.8 °F (36 °C) (Temporal)   Resp 16   Ht 5' 7\" (1.702 m)   Wt 260 lb (117.9 kg)   LMP 06/29/2002 (Within Months)   SpO2 98%   BMI 40.72 kg/m²  Body mass index is 40.72 kg/m². Physical Exam  Constitutional:       General: She is not in acute distress. Appearance: She is well-developed. She is obese. She is not ill-appearing, toxic-appearing or diaphoretic. HENT:      Head: Normocephalic and atraumatic. Nose: Nose normal. No congestion. Mouth/Throat:      Mouth: Mucous membranes are moist.      Pharynx: Oropharynx is clear. No oropharyngeal exudate or posterior oropharyngeal erythema. Comments: Upper and lower dentures  Eyes:      General: No scleral icterus. Right eye: No discharge. Left eye: No discharge. Pupils: Pupils are equal, round, and reactive to light. Neck:      Trachea: No tracheal deviation. Cardiovascular:      Rate and Rhythm: Normal rate and regular rhythm. Heart sounds: Normal heart sounds. No murmur heard. No friction rub. No gallop. Comments: Left chest implanted pacemaker in place with intact overlying skin. Pulmonary:      Effort: Pulmonary effort is normal. No respiratory distress. Breath sounds: Normal breath sounds. No wheezing, rhonchi or rales. Abdominal:      General: Bowel sounds are normal. There is no distension. Palpations: Abdomen is soft. Tenderness: There is no abdominal tenderness. There is no guarding. Musculoskeletal:         General: No swelling or tenderness. Cervical back: Neck supple. No tenderness. Right lower leg: Edema (Trace) present. Left lower leg: Edema (Trace) present.    Skin: General: Skin is warm and dry. Coloration: Skin is not jaundiced. Findings: No bruising, erythema or rash. Neurological:      General: No focal deficit present. Mental Status: She is alert and oriented to person, place, and time. Cranial Nerves: No cranial nerve deficit. Gait: Gait abnormal (ambulates with cane). Psychiatric:         Mood and Affect: Mood normal.        PROVISIONAL DIAGNOSES / SURGERY:      REESE'S ESOPHAGUS GERD     EGD ESOPHAGOGASTRODUODENOSCOPY    Patient Active Problem List    Diagnosis Date Noted    D&C Hysteroscopy 7/25/2019 07/25/2019    History of total left knee replacement 09/19/2017    PMB (postmenopausal bleeding) 05/16/2013    Endometrial thickening on ultrasound 05/16/2013    History of myocardial infarction     Hypertension     Tobacco abuse     S/P tubal ligation 05/16/2013    Hypercholesterolemia     Diverticulitis     Obesity     Osteopenia 05/09/2018    Arthritis     Bruises easily     Coronary atherosclerosis 08/04/2021    Cardiac pacemaker in situ 06/24/2021    Severe obesity (BMI 35.0-39. 9) with comorbidity ([address] 06/18/2021    Sick sinus syndrome ([address] 06/04/2021    Nocturnal enuresis 05/03/2021    OAB (overactive bladder) 05/03/2021    Sigmoid stricture ([address] 12/03/2020    Sigmoid diverticulosis 12/03/2020    Gastroesophageal reflux disease 06/02/2020    Chronic narcotic use 06/02/2020    Lumbar radiculopathy     Dyslipidemia 06/25/2019    Moderate episode of recurrent major depressive disorder ([address] 05/04/2019    Colon polyp 04/16/2019    IBS (irritable bowel syndrome) 02/19/2019    Reese esophagus 11/01/2018    Primary osteoarthritis of right knee 10/05/2018    Anemia 10/04/2018    Constipation 10/04/2018    Hyperkalemia 07/04/2018    Iron deficiency anemia due to chronic blood loss 07/03/2018    Epistaxis 06/18/2018    Dizziness 06/17/2018    Uterine hyperplasia Simple and complex WITHOUT ATYPIA (7/17/2017) 09/19/2017    Fibroid uterus 07/17/2017  7/17/17 Hysto, D/C, Myosure  07/17/2017    H/O dilation and curettage 07/17/2017    CAD S/P percutaneous coronary angioplasty 06/08/2017    Type 2 diabetes mellitus without complication, without long-term current use of insulin ([facility] 06/08/2017    CKD (chronic kidney disease) stage 3, GFR 30-59 ml/min ([facility] 06/08/2017    Diabetic polyneuropathy associated with type 2 diabetes mellitus ([facility] 06/08/2017    Morbid obesity due to excess calories ([facility] 06/08/2017    Carpal tunnel syndrome on right 05/25/2017    Chronic renal impairment, stage 3 (moderate) ([facility] 05/15/2017    Chronic low back pain 03/13/2017    Primary osteoarthritis of both knees 03/13/2017    Atherosclerosis of native coronary artery of native heart without angina pectoris 02/06/2017    Diabetes ([facility] [facility] 02/06/2017    SOB (shortness of breath) 06/20/2016    Chest pain 12/29/2015    DDD (degenerative disc disease)     Neuropathy            LG Juarez - CNP on 12/10/2021 at 12:51 PM

## 2021-12-10 NOTE — H&P (VIEW-ONLY)
HISTORY and [id number]       NAME:  Eric Costa  MRN: [id number]   YOB: 1955   Date: 12/10/2021   Age: 77 y.o. Gender: female       COMPLAINT AND PRESENT HISTORY:   Eric Costa is 77 y.o. female, undergoing preadmission testing for EGD. Pt has had a previous EGD last in 2018. History of Guerrero's esophagus, GERD, diverticulosis, sigmoid stricture. Takes Protonix with moderate relief. She takes Tums for breakthrough symptoms. Pt has been experiencing RLQ cramping abdominal pain that is intermittent in nature. Pt c/o bloating and gas. Pt has intermittent nausea without associated vomiting. Pt has alternating diarrhea and constipation. Pt has occasional dark stools but denies hematochezia. Denies decreased appetite and unexpected weight loss. She reports unexpected weight gain related to fluid retention secondary to CHF. She does take diuretics. Pt c/o SOB with exertion. She does weigh herself daily. PMHx includes:    DM: She is not currently taking any medications for DM. Sees Dr. Marlo Herrera for endocrinology. Lab Results   Component Value Date    LABA1C 5.7 08/17/2021     Lab Results   Component Value Date     08/17/2021     CAD / MI / HTN / Paroxysmal atrial fibrillation / Chronic diastolic heart failure: Pt s/p coronary angioplasty x3 in the past with stent placement x 1 in 2009. History of SSS s/p pacemaker placement in June, 2021. Takes Lopressor, Lipitor, Imdur, Lasix, aspirin. Takes nitroglycerin prn with last use approximately one month ago. Pt follows sodium and fluid restrictions per cardiology. Sees Dr. Vamshi Maldonado for cardiology. She has an upcoming appointment prior to scheduled procedure. Takes Eliquis for anticoagulation.      BP Readings from Last 3 Encounters:   12/10/21 126/69   11/09/21 138/71   08/04/21 128/68     Echocardiogram completed on 05/25/2021:    Left Ventricle: Systolic function is normal with an ejection fraction   of 55-60%.   Mitral Valve: There is mild regurgitation. There is no evidence of   mitral valve stenosis.   Tricuspid Valve: There is mild regurgitation. There is no evidence of   tricuspid valve stenosis. Stress test completed on 08/24/2021:  Findings: There are anterior and lateral wall perfusion defects also involving the apex without significant redistribution. On gated analysis, there is global hypokinetic motion. LVEF is 44 %. TID is 0.93. Impression:   1. Intermediate risk for a cardiovascular event. CKD: Stage 3. Sees Dr. John Wasserman for nephrology. Lab Results   Component Value Date    CREATININE 1.40 11/04/2021     Denies personal and family history of complications of anesthesia. PAST MEDICAL HISTORY     Past Medical History:   Diagnosis Date    Anemia     Arthritis     back    Atrial fibrillation (HCC)     on Eliquis for    Guerrero esophagus 11/01/2018    Bruises easily     CAD S/P percutaneous coronary angioplasty 6/8/2017    cardiac stent x1 5-29-09    CHF (congestive heart failure) ([facility] [address] 08/23/2021    go[facility] Congest[facility] heart clinic.  Chronic back pain, KNEE PAIN AND NECK PAIN.      CKD (chronic kidney disease) stage 3, GFR 30-59 ml/min (HCC)     Colon polyp 04/16/2019    hyperplastic polyp    COVID-19 vaccine administered 12-,4-,328-2021    Pfizer    DDD (degenerative disc disease)     DDD (degenerative disc disease)     Depression     Diverticulitis     Fibromyalgia     Full dentures     H/O dilation and curettage 07/17/2017    History of blood transfusion     no problems    History of myocardial infarction 5/09    Hx of blood clots     left kidney    Hypercholesterolemia     Hypertension     IBS (irritable bowel syndrome)     Neuropathy     Nocturia     Obesity     Post-menopausal bleeding     Short of breath on exertion     Sick sinus syndrome ([facility] [address]     Pacemaker placed 6-    Spinal stenosis     Socioeconomic History    Marital status:      Spouse name: None    Number of children: None    Years of education: None    Highest education level: None   Occupational History    None   Tobacco Use    Smoking status: Former Smoker     Packs/day: 0.50     Years: 40.00     Pack years: 20.00     Quit date: 2014     Years since quittin.3    Smokeless tobacco: Never Used   Vaping Use    Vaping Use: Never used   Substance and Sexual Activity    Alcohol use: Not Currently    Drug use: No    Sexual activity: Never     Birth control/protection: Post-menopausal   Other Topics Concern    None   Social History Narrative    None     Social Determinants of Health     Financial Resource Strain:     Difficulty of Paying Living Expenses: Not on file   Food Insecurity:     Worried About Running Out of Food in the Last Year: Not on file    [place] of Food in the Last Year: Not on file   Transportation Needs:     Lack of Transportation (Medical): Not on file    Lack of Transportation (Non-Medical):  Not on file   Physical Activity:     Days of Exercise per Week: Not on file    Minutes of Exercise per Session: Not on file   Stress:     Feeling of Stress : Not on file   Social Connections:     Frequency of Communication with Friends and Family: Not on file    Frequency of Social Gatherings with Friends and Family: Not on file    Attends [employer] Services: Not on file    Active Member of [address] [place] [address] or Organizations: Not on file    Attends Club or Organization Meetings: Not on file    Marital Status: Not on file   Intimate Partner Violence:     Fear of Current or Ex-Partner: Not on file    Emotionally Abused: Not on file    Physically Abused: Not on file    Sexually Abused: Not on file   Housing Stability:     Unable to Pay for Housing in the Last Year: Not on file    Number of [place] in the Last Year: Not on file    Unstable Housing in the Last Year: Not on file        REVIEW OF SYSTEMS      Allergies   Allergen Reactions    Adhesive Tape Rash    Pcn [Penicillins] Hives    Amlodipine      Leg swelling    Codeine Other (See Comments)     Stomach cramps    Gabapentin Other (See Comments)     Causes hands shake and unable to hold anything    Lyrica [Pregabalin] Other (See Comments)     \"trouble concentrating\"    Sulfa Antibiotics Other (See Comments)     Cramps    Other Rash     white    Oxycodone-Acetaminophen Itching and Rash       Current Outpatient Medications on File Prior to Encounter   Medication Sig Dispense Refill    apixaban (ELIQUIS) 5 MG TABS tablet Take 5 mg by mouth 2 times daily      aspirin 81 MG EC tablet Take 81 mg by mouth daily      LINZESS 290 MCG CAPS capsule TAKE 1 CAPSULE BY MOUTH ONCE DAILY IN THE MORNING BEFORE BREAKFAST 30 capsule 0    cetirizine (ZYRTEC) 10 MG tablet Take 1 tablet by mouth nightly      vitamin D (CHOLECALCIFEROL) 25 MCG (1000 UT) TABS tablet Take 1,000 Units by mouth daily      co-enzyme Q-10 30 MG capsule Take 30 mg by mouth 3 times daily      dronedarone hcl (MULTAQ) 400 MG TABS Take 400 mg by mouth 2 times daily (with meals)      clotrimazole-betamethasone (LOTRISONE) 1-0.05 % cream APPLY  CREAM TOPICALLY TO AFFECTED AREA TWICE DAILY FOR 4 DAYS AND THEN ONCE DAILY FOR 3 DAYS 15 g 0    tiZANidine (ZANAFLEX) 2 MG tablet TAKE 1 TABLET BY MOUTH EVERY 8 HOURS AS NEEDED FOR PAIN (Patient taking differently: 4 mg ) 90 tablet 0    Multiple Vitamin (MULTI VITAMIN DAILY PO) Take by mouth      calcium citrate-vitamin D (CITRICAL + D) 315-250 MG-UNIT TABS per tablet Take 1 tablet by mouth 2 times daily (with meals)       ONETOUCH VERIO strip USE 1 STRIP TO CHECK GLUCOSE TWICE DAILY      Lancets (ONETOUCH DELICA PLUS [id number]) MISC USE 1 TO CHECK GLUCOSE 4 TIMES DAILY      fluticasone (FLONASE) 50 MCG/ACT nasal spray 1 spray by Each Nostril route daily as needed for Rhinitis       Probiotic Product (PROBIOTIC ADVANCED PO) Take by mouth daily      albuterol sulfate HFA (PROVENTIL HFA) 108 (90 Base) MCG/ACT inhaler Inhale 2 puffs into the lungs       Biotin 10 MG CAPS Take by mouth      vitamin E 400 UNIT capsule Take 400 Units by mouth 2 times daily      pantoprazole (PROTONIX) 40 MG tablet Take 40 mg by mouth daily      Cyanocobalamin (VITAMIN B 12 PO) Take 500 mg by mouth      ferrous sulfate 325 (65 Fe) MG EC tablet Take 325 mg by mouth 3 times daily (with meals)      isosorbide mononitrate (IMDUR) 60 MG extended release tablet Take 90 mg by mouth daily 1 1/2 tablet daily      HYDROcodone-acetaminophen (NORCO) 5-325 MG per tablet Take 1 tablet by mouth every 12 hours as needed for Pain.  DULoxetine (CYMBALTA) 30 MG extended release capsule Take 60 mg by mouth 2 times daily       nitroGLYCERIN (NITROSTAT) 0.4 MG SL tablet Place 0.4 mg under the tongue every 5 minutes as needed for Chest pain up to max of 3 total doses. If no relief after 1 dose, call 911.  metoprolol (LOPRESSOR) 50 MG tablet Take 50 mg by mouth 2 times daily.  atorvastatin (LIPITOR) 80 MG tablet Take 80 mg by mouth daily. No current facility-administered medications on file prior to encounter. Review of Systems   Constitutional: Positive for unexpected weight change. Negative for appetite change, chills and fever. HENT: Negative for congestion and sore throat. Full upper and lower dentures. Eyes: Positive for visual disturbance (Glasses). Respiratory: Positive for shortness of breath (with exertion). Negative for cough and wheezing. Cardiovascular: Positive for palpitations (Occasional) and leg swelling. Negative for chest pain. Gastrointestinal:        See HPI   Genitourinary: Positive for frequency (On diuretic). Negative for dysuria and hematuria. Pt has urinary incontinence. Pt sees urology   Musculoskeletal: Positive for arthralgias, back pain and neck pain. Skin: Negative for rash and wound. Neurological: Positive for dizziness (Occasionally), light-headedness (Occasionally) and headaches (Occasionally). Negative for speech difficulty. Hematological: Bruises/bleeds easily. GENERAL PHYSICAL EXAM     Vitals: /69   Pulse 59   Temp 96.8 °F (36 °C) (Temporal)   Resp 16   Ht 5' 7\" (1.702 m)   Wt 260 lb (117.9 kg)   LMP 06/29/2002 (Within Months)   SpO2 98%   BMI 40.72 kg/m²  Body mass index is 40.72 kg/m². Physical Exam  Constitutional:       General: She is not in acute distress. Appearance: She is well-developed. She is obese. She is not ill-appearing, toxic-appearing or diaphoretic. HENT:      Head: Normocephalic and atraumatic. Nose: Nose normal. No congestion. Mouth/Throat:      Mouth: Mucous membranes are moist.      Pharynx: Oropharynx is clear. No oropharyngeal exudate or posterior oropharyngeal erythema. Comments: Upper and lower dentures  Eyes:      General: No scleral icterus. Right eye: No discharge. Left eye: No discharge. Pupils: Pupils are equal, round, and reactive to light. Neck:      Trachea: No tracheal deviation. Cardiovascular:      Rate and Rhythm: Normal rate and regular rhythm. Heart sounds: Normal heart sounds. No murmur heard. No friction rub. No gallop. Comments: Left chest implanted pacemaker in place with intact overlying skin. Pulmonary:      Effort: Pulmonary effort is normal. No respiratory distress. Breath sounds: Normal breath sounds. No wheezing, rhonchi or rales. Abdominal:      General: Bowel sounds are normal. There is no distension. Palpations: Abdomen is soft. Tenderness: There is no abdominal tenderness. There is no guarding. Musculoskeletal:         General: No swelling or tenderness. Cervical back: Neck supple. No tenderness. Right lower leg: Edema (Trace) present. Left lower leg: Edema (Trace) present.    Skin: General: Skin is warm and dry. Coloration: Skin is not jaundiced. Findings: No bruising, erythema or rash. Neurological:      General: No focal deficit present. Mental Status: She is alert and oriented to person, place, and time. Cranial Nerves: No cranial nerve deficit. Gait: Gait abnormal (ambulates with cane). Psychiatric:         Mood and Affect: Mood normal.        PROVISIONAL DIAGNOSES / SURGERY:      REESE'S ESOPHAGUS GERD     EGD ESOPHAGOGASTRODUODENOSCOPY    Patient Active Problem List    Diagnosis Date Noted    D&C Hysteroscopy 7/25/2019 07/25/2019    History of total left knee replacement 09/19/2017    PMB (postmenopausal bleeding) 05/16/2013    Endometrial thickening on ultrasound 05/16/2013    History of myocardial infarction     Hypertension     Tobacco abuse     S/P tubal ligation 05/16/2013    Hypercholesterolemia     Diverticulitis     Obesity     Osteopenia 05/09/2018    Arthritis     Bruises easily     Coronary atherosclerosis 08/04/2021    Cardiac pacemaker in situ 06/24/2021    Severe obesity (BMI 35.0-39. 9) with comorbidity ([address] 06/18/2021    Sick sinus syndrome ([address] 06/04/2021    Nocturnal enuresis 05/03/2021    OAB (overactive bladder) 05/03/2021    Sigmoid stricture ([address] 12/03/2020    Sigmoid diverticulosis 12/03/2020    Gastroesophageal reflux disease 06/02/2020    Chronic narcotic use 06/02/2020    Lumbar radiculopathy     Dyslipidemia 06/25/2019    Moderate episode of recurrent major depressive disorder ([address] 05/04/2019    Colon polyp 04/16/2019    IBS (irritable bowel syndrome) 02/19/2019    Reese esophagus 11/01/2018    Primary osteoarthritis of right knee 10/05/2018    Anemia 10/04/2018    Constipation 10/04/2018    Hyperkalemia 07/04/2018    Iron deficiency anemia due to chronic blood loss 07/03/2018    Epistaxis 06/18/2018    Dizziness 06/17/2018    Uterine hyperplasia Simple and complex WITHOUT ATYPIA (7/17/2017) 09/19/2017    Fibroid uterus 07/17/2017  7/17/17 Hysto, D/C, Myosure  07/17/2017    H/O dilation and curettage 07/17/2017    CAD S/P percutaneous coronary angioplasty 06/08/2017    Type 2 diabetes mellitus without complication, without long-term current use of insulin ([facility] [address] 06/08/2017    CKD (chronic kidney disease) stage 3, GFR 30-59 ml/min ([facility]) 06/08/2017    Diabetic polyneuropathy associated with type 2 diabetes mellitus ([address] 06/08/2017    Morbid obesity due to excess calories ([facility] [address] 06/08/2017    Carpal tunnel syndrome on right 05/25/2017    Chronic renal impairment, stage 3 (moderate) ([facility]) 05/15/2017    Chronic low back pain 03/13/2017    Primary osteoarthritis of both knees 03/13/2017    Atherosclerosis of native coronary artery of native heart without angina pectoris 02/06/2017    Diabetes ([facility] [address] 02/06/2017    SOB (shortness of breath) 06/20/2016    Chest pain 12/29/2015    DDD (degenerative disc disease)     Neuropathy            LG Brooks - CNP on 12/10/2021 at 12:51 PM

## 2021-12-13 LAB
EKG ATRIAL RATE: 66 BPM
EKG P AXIS: 18 DEGREES
EKG P-R INTERVAL: 174 MS
EKG Q-T INTERVAL: 456 MS
EKG QRS DURATION: 96 MS
EKG QTC CALCULATION (BAZETT): 478 MS
EKG R AXIS: 47 DEGREES
EKG T AXIS: 31 DEGREES
EKG VENTRICULAR RATE: 66 BPM

## 2021-12-16 ENCOUNTER — TELEPHONE (OUTPATIENT)
Dept: GASTROENTEROLOGY | Age: 66
End: 2021-12-16

## 2021-12-16 NOTE — TELEPHONE ENCOUNTER
Rec'd clearance for Eliquis and asa 12/16/21. Per Dr Ceasar Young pt ok to hold Eliquis 48-72 hrs prior and asa 5-7 days prior to procedure. Also, rec'd proof of pacemaker interrogation w/in last 6 months. Writer called and spoke to pt and inform her to begin holding aspirin 12/16 and Eliquis 12/21. Pt voiced understanding and had no further questions.

## 2021-12-22 ENCOUNTER — ANESTHESIA EVENT (OUTPATIENT)
Dept: ENDOSCOPY | Age: 66
End: 2021-12-22
Payer: MEDICARE

## 2021-12-23 ENCOUNTER — ANESTHESIA (OUTPATIENT)
Dept: ENDOSCOPY | Age: 66
End: 2021-12-23
Payer: MEDICARE

## 2021-12-23 ENCOUNTER — HOSPITAL ENCOUNTER (OUTPATIENT)
Age: 66
Setting detail: OUTPATIENT SURGERY
Discharge: HOME | End: 2021-12-23
Attending: INTERNAL MEDICINE | Admitting: INTERNAL MEDICINE
Payer: MEDICARE

## 2021-12-23 VITALS
TEMPERATURE: 96.5 F | RESPIRATION RATE: 16 BRPM | HEIGHT: 67 IN | DIASTOLIC BLOOD PRESSURE: 93 MMHG | OXYGEN SATURATION: 100 % | BODY MASS INDEX: 40.81 KG/M2 | HEART RATE: 58 BPM | WEIGHT: 260 LBS | SYSTOLIC BLOOD PRESSURE: 198 MMHG

## 2021-12-23 VITALS — SYSTOLIC BLOOD PRESSURE: 196 MMHG | OXYGEN SATURATION: 99 % | DIASTOLIC BLOOD PRESSURE: 96 MMHG

## 2021-12-23 PROCEDURE — 3700000000 HC ANESTHESIA ATTENDED CARE: Performed by: INTERNAL MEDICINE

## 2021-12-23 PROCEDURE — 7100000011 HC PHASE II RECOVERY - ADDTL 15 MIN: Performed by: INTERNAL MEDICINE

## 2021-12-23 PROCEDURE — 3609012400 HC EGD TRANSORAL BIOPSY SINGLE/MULTIPLE: Performed by: INTERNAL MEDICINE

## 2021-12-23 PROCEDURE — 43239 EGD BIOPSY SINGLE/MULTIPLE: CPT | Performed by: INTERNAL MEDICINE

## 2021-12-23 PROCEDURE — 7100000030 HC ASPR PHASE II RECOVERY - FIRST 15 MIN: Performed by: INTERNAL MEDICINE

## 2021-12-23 PROCEDURE — 88342 IMHCHEM/IMCYTCHM 1ST ANTB: CPT

## 2021-12-23 PROCEDURE — 7100000010 HC PHASE II RECOVERY - FIRST 15 MIN: Performed by: INTERNAL MEDICINE

## 2021-12-23 PROCEDURE — 2709999900 HC NON-CHARGEABLE SUPPLY: Performed by: INTERNAL MEDICINE

## 2021-12-23 PROCEDURE — 2580000003 HC RX 258: Performed by: ANESTHESIOLOGY

## 2021-12-23 PROCEDURE — 7100000001 HC PACU RECOVERY - ADDTL 15 MIN: Performed by: INTERNAL MEDICINE

## 2021-12-23 PROCEDURE — 7100000031 HC ASPR PHASE II RECOVERY - ADDTL 15 MIN: Performed by: INTERNAL MEDICINE

## 2021-12-23 PROCEDURE — 6360000002 HC RX W HCPCS: Performed by: NURSE ANESTHETIST, CERTIFIED REGISTERED

## 2021-12-23 PROCEDURE — 7100000000 HC PACU RECOVERY - FIRST 15 MIN: Performed by: INTERNAL MEDICINE

## 2021-12-23 PROCEDURE — 88305 TISSUE EXAM BY PATHOLOGIST: CPT

## 2021-12-23 RX ORDER — SODIUM CHLORIDE 9 MG/ML
25 INJECTION, SOLUTION INTRAVENOUS PRN
Status: DISCONTINUED | OUTPATIENT
Start: 2021-12-23 | End: 2021-12-23 | Stop reason: HOSPADM

## 2021-12-23 RX ORDER — PROPOFOL 10 MG/ML
INJECTION, EMULSION INTRAVENOUS PRN
Status: DISCONTINUED | OUTPATIENT
Start: 2021-12-23 | End: 2021-12-23 | Stop reason: SDUPTHER

## 2021-12-23 RX ORDER — LIDOCAINE HYDROCHLORIDE 10 MG/ML
1 INJECTION, SOLUTION EPIDURAL; INFILTRATION; INTRACAUDAL; PERINEURAL
Status: DISCONTINUED | OUTPATIENT
Start: 2021-12-23 | End: 2021-12-23 | Stop reason: HOSPADM

## 2021-12-23 RX ORDER — SODIUM CHLORIDE 0.9 % (FLUSH) 0.9 %
10 SYRINGE (ML) INJECTION EVERY 12 HOURS SCHEDULED
Status: DISCONTINUED | OUTPATIENT
Start: 2021-12-23 | End: 2021-12-23 | Stop reason: HOSPADM

## 2021-12-23 RX ORDER — SODIUM CHLORIDE 0.9 % (FLUSH) 0.9 %
10 SYRINGE (ML) INJECTION PRN
Status: DISCONTINUED | OUTPATIENT
Start: 2021-12-23 | End: 2021-12-23 | Stop reason: HOSPADM

## 2021-12-23 RX ORDER — SODIUM CHLORIDE 9 MG/ML
INJECTION, SOLUTION INTRAVENOUS CONTINUOUS
Status: DISCONTINUED | OUTPATIENT
Start: 2021-12-23 | End: 2021-12-23 | Stop reason: HOSPADM

## 2021-12-23 RX ADMIN — PROPOFOL 150 MG: 10 INJECTION, EMULSION INTRAVENOUS at 11:58

## 2021-12-23 RX ADMIN — SODIUM CHLORIDE: 9 INJECTION, SOLUTION INTRAVENOUS at 10:25

## 2021-12-23 ASSESSMENT — PAIN SCALES - GENERAL
PAINLEVEL_OUTOF10: 0

## 2021-12-23 ASSESSMENT — PULMONARY FUNCTION TESTS
PIF_VALUE: 1

## 2021-12-23 ASSESSMENT — LIFESTYLE VARIABLES: SMOKING_STATUS: 0

## 2021-12-23 ASSESSMENT — ENCOUNTER SYMPTOMS
DYSPNEA ACTIVITY LEVEL: NO INTERVAL CHANGE
STRIDOR: 0
SHORTNESS OF BREATH: 1

## 2021-12-23 ASSESSMENT — PAIN - FUNCTIONAL ASSESSMENT: PAIN_FUNCTIONAL_ASSESSMENT: 0-10

## 2021-12-23 NOTE — INTERVAL H&P NOTE
Update History & Physical    The patient's History and Physical of December 10, 2021 was reviewed with the patient and I examined the patient. There was no change. Physical exam remains unchanged. Pt is diabetic, she did not check her bs this morning. NPO since midnight. Sip of water with metoprolol, Imdur and Multaq. Pt does not wear dentures. Pt does have a  hx of MRSA infection  Pt Eliquis, last dose 12/21 and ASA last dose was 12/16  Pt denies any personal or FHx of complications with anesthesia. Pt denies any acute symptoms of illness at this time including no SOB, CP, fever, URI or UTI symptoms.         Electronically signed by LG Saravia CNP on 12/23/2021 at 9:25 AM

## 2021-12-23 NOTE — ANESTHESIA PRE PROCEDURE
Department of Anesthesiology  Preprocedure Note       Name:  Shamar Menendez   Age:  77 y.o.  :  1955                                          MRN:  [id number]         Date:  2021      Surgeon: Ismael Ramirez):  Bharath Jimenez MD    Procedure: Procedure(s):  EGD ESOPHAGOGASTRODUODENOSCOPY    Medications prior to admission:   Prior to Admission medications    Medication Sig Start Date End Date Taking? Authorizing Provider   cetirizine (ZYRTEC) 10 MG tablet Take 1 tablet by mouth nightly 21  Yes Historical Provider, MD   dronedarone hcl (MULTAQ) 400 MG TABS Take 400 mg by mouth 2 times daily (with meals) 21  Yes Historical Provider, MD   clotrimazole-betamethasone (LOTRISONE) 1-0.05 % cream APPLY  CREAM TOPICALLY TO AFFECTED AREA TWICE DAILY FOR 4 DAYS AND THEN ONCE DAILY FOR 3 DAYS 20  Yes LG Orr - CNP   tiZANidine (ZANAFLEX) 2 MG tablet TAKE 1 TABLET BY MOUTH EVERY 8 HOURS AS NEEDED FOR PAIN  Patient taking differently: 4 mg  20  Yes Jen Barrientos MD   pantoprazole (PROTONIX) 40 MG tablet Take 40 mg by mouth daily   Yes Historical Provider, MD   isosorbide mononitrate (IMDUR) 60 MG extended release tablet Take 90 mg by mouth daily 1 1/2 tablet daily 18  Yes Historical Provider, MD   HYDROcodone-acetaminophen (NORCO) 5-325 MG per tablet Take 1 tablet by mouth every 12 hours as needed for Pain. Yes Historical Provider, MD   DULoxetine (CYMBALTA) 30 MG extended release capsule Take 60 mg by mouth 2 times daily    Yes Historical Provider, MD   metoprolol (LOPRESSOR) 50 MG tablet Take 50 mg by mouth 2 times daily. Yes Historical Provider, MD   atorvastatin (LIPITOR) 80 MG tablet Take 80 mg by mouth daily.      Yes Historical Provider, MD   apixaban (ELIQUIS) 5 MG TABS tablet Take 5 mg by mouth 2 times daily    Historical Provider, MD   aspirin 81 MG EC tablet Take 81 mg by mouth daily    Historical Provider, MD   LINZESS 290 MCG CAPS capsule TAKE 1 CAPSULE BY MOUTH ONCE DAILY IN THE MORNING BEFORE BREAKFAST 12/10/21   Jeanna Leonard MD   vitamin D (CHOLECALCIFEROL) 25 MCG (1000 UT) TABS tablet Take 1,000 Units by mouth daily    Historical Provider, MD   co-enzyme Q-10 30 MG capsule Take 30 mg by mouth 3 times daily    Historical Provider, MD   Multiple Vitamin (MULTI VITAMIN DAILY PO) Take by mouth    Historical Provider, MD   calcium citrate-vitamin D (CITRICAL + D) 315-250 MG-UNIT TABS per tablet Take 1 tablet by mouth 2 times daily (with meals)     Historical Provider, MD Bossman Holden strip USE 1 STRIP TO [address] [facility] 1/16/20   Historical Provider, MD   Lancets (Price Kins) [address] [facility] USE 1 TO CHECK GLUCOSE 4 TIMES DAILY 1/16/20   Historical Provider, MD   fluticasone (FLONASE) 50 MCG/ACT nasal spray 1 spray by Each Nostril route daily as needed for Rhinitis     Historical Provider, MD   Probiotic Product (PROBIOTIC ADVANCED PO) Take by mouth daily    Historical Provider, MD   albuterol sulfate HFA (PROVENTIL HFA) 108 (90 Base) MCG/ACT inhaler Inhale 2 puffs into the lungs  4/29/19   Historical Provider, MD   Biotin 10 MG CAPS Take by mouth    Historical Provider, MD   vitamin E 400 UNIT capsule Take 400 Units by mouth 2 times daily    Historical Provider, MD   Cyanocobalamin (VITAMIN B 12 PO) Take 500 mg by mouth    Historical Provider, MD   ferrous sulfate 325 (65 Fe) MG EC tablet Take 325 mg by mouth 3 times daily (with meals)    Historical Provider, MD   nitroGLYCERIN (NITROSTAT) 0.4 MG SL tablet Place 0.4 mg under the tongue every 5 minutes as needed for Chest pain up to max of 3 total doses. If no relief after 1 dose, call 911.      Historical Provider, MD       Current medications:    Current Facility-Administered Medications   Medication Dose Route Frequency Provider Last Rate Last Admin    0.9 % sodium chloride infusion   IntraVENous Continuous Nahed Dang  mL/hr at 12/23/21 1025 New Bag at 12/23/21 1025    sodium chloride flush 0.9 % injection 10 mL  10 mL IntraVENous 2 times per day Isael Pickens MD        sodium chloride flush 0.9 % injection 10 mL  10 mL IntraVENous PRN Isael Pickens MD        0.9 % sodium chloride infusion  25 mL IntraVENous PRN Isael Pickens MD        lidocaine PF 1 % injection 1 mL  1 mL IntraDERmal Once PRN Isael Pickens MD           Allergies:     Allergies   Allergen Reactions    Adhesive Tape Rash    Pcn [Penicillins] Hives    Amlodipine      Leg swelling    Codeine Other (See Comments)     Stomach cramps    Gabapentin Other (See Comments)     Causes hands shake and unable to hold anything    Lyrica [Pregabalin] Other (See Comments)     \"trouble concentrating\"    Oxycodone Hives    Sulfa Antibiotics Other (See Comments)     Cramps    Other Rash     white       Problem List:    Patient Active Problem List   Diagnosis Code    History of myocardial infarction I25.2    Hypercholesterolemia E78.00    Hypertension I10    Diverticulitis K57.92    Tobacco abuse Z72.0    Arthritis M19.90    Bruises easily R23.8    Obesity E66.9    PMB (postmenopausal bleeding) N95.0    Endometrial thickening on ultrasound R93.89    S/P tubal ligation Z98.51    DDD (degenerative disc disease) [id number]    Neuropathy G62.9    SOB (shortness of breath) R06.02    CAD S/P percutaneous coronary angioplasty I25.10, Z98.61    Type 2 diabetes mellitus without complication, without long-term current use of insulin ([facility]) E11.9    CKD (chronic kidney disease) stage 3, GFR 30-59 ml/min ([facility]) N18.30    Diabetic polyneuropathy associated with type 2 diabetes mellitus ([facility]) E11.42    Morbid obesity due to excess calories ([facility]) E66.01    Fibroid uterus D25.9    7/17/17 Hysto, D/C, Myosure  Z98.890    H/O dilation and curettage Z98.890    Uterine hyperplasia Simple and complex WITHOUT ATYPIA (7/17/2017) N85.2    History of total left knee replacement Z96.652    Osteopenia M85.80    Anemia D64.9    Constipation K59.00    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    IBS (irritable bowel syndrome) K58.9    Colon polyp K63.5    D&C Hysteroscopy 7/25/2019 Z98.890    Lumbar radiculopathy M54.16    Carpal tunnel syndrome on right G56.01    Atherosclerosis of native coronary artery of native heart without angina pectoris I25.10    Chest pain R07.9    Chronic low back pain M54.50, G89.29    Diabetes ([facility]) E11.9    Dizziness R42    Dyslipidemia E78.5    Epistaxis R04.0    Hyperkalemia E87.5    Iron deficiency anemia due to chronic blood loss D50.0    Moderate episode of recurrent major depressive disorder ([facility]) F33.1    Primary osteoarthritis of both knees M17.0    Primary osteoarthritis of right knee M17.11    Gastroesophageal reflux disease K21.9    Chronic narcotic use F11.90    Sigmoid stricture ([facility]) K56.699    Sigmoid diverticulosis K57.30    Sick sinus syndrome ([facility]) I49.5    Cardiac pacemaker in situ Z95.0    Nocturnal enuresis N39.44    OAB (overactive bladder) N32.81    Coronary atherosclerosis I25.10    Chronic renal impairment, stage 3 (moderate) ([facility]) N18.30    Severe obesity (BMI 35.0-39. 9) with comorbidity ([facility] E66.01       Past Medical History:        Diagnosis Date    Anemia     Arthritis     back    Atrial fibrillation ([facility])     on Eliquis for    Guerrero esophagus 11/01/2018    Bruises easily     CAD S/P percutaneous coronary angioplasty 06/08/2017    cardiac stent x1 5-29-09    CHF (congestive heart failure) ([facility] 08/23/2021    [facility].  Chronic back pain, KNEE PAIN AND NECK PAIN.      CKD (chronic kidney disease) stage 3, GFR 30-59 ml/min ([facility])     Colon polyp 04/16/2019    hyperplastic polyp    COVID-19 vaccine administered 12-,4-,328-2021    Pfizer    DDD (degenerative disc disease)     DDD (degenerative disc disease)     Depression     Diverticulitis     Fibromyalgia     Full dentures     GERD (gastroesophageal reflux disease)     H/O dilation and curettage 07/17/2017    History of blood transfusion     no problems    History of myocardial infarction 05/2009    Hx of blood clots     left kidney    Hypercholesterolemia     Hypertension     IBS (irritable bowel syndrome)     MRSA (methicillin resistant staph aureus) culture positive     2018 in [facility]    Neuropathy     Nocturia     Obesity     Post-menopausal bleeding     Short of breath on exertion     Sick sinus syndrome ([address]     Pacemaker placed 6-    Spinal stenosis     Thyroid nodule     Tobacco abuse     'quit 7 yrs ago. \" Quit August 2014\"    Type 2 diabetes mellitus without complication, without long-term current use of insulin ([address] 06/08/2017    Wears glasses        Past Surgical History:        Procedure Laterality Date    BREAST BIOPSY  2005    Left---Benign    CARDIAC CATHETERIZATION  06/20/2016    no intervention    COLONOSCOPY  2011    diverticulitis    COLONOSCOPY N/A 4/16/2019    hyperplastic polyp    CORONARY ANGIOPLASTY WITH STENT PLACEMENT  05/2009    X 1    DILATION AND CURETTAGE OF UTERUS N/A 7/17/2017    DILATATION AND CURETTAGE OPERATIVE HYSTEROSCOPY WITH MYOSURE, POLYPECTOMY, MYOMECTOMY performed by Abi Welch DO at [address] [facility] N/A 7/25/2019    DILATATION AND CURETTAGE HYSTEROSCOPY performed by Abi Welch DO at [address] [facility] HYSTEROSCOPY  12/16/13    Operative Hscope with endometrial sampling, polypectomy & myomectomy [facility]    HYSTEROSCOPY  07/17/2017    PACEMAKER PLACEMENT  06/15/2021    Sick sinus syndrome    MA OFFICE/OUTPT VISIT,PROCEDURE ONLY N/A 1/10/2018    DILATATION AND CURETTAGE HYSTEROSCOPY performed by Abi Welch DO at [address] [facility] BIOPSY      TONSILLECTOMY  age 22   [facility] TOTAL KNEE ARTHROPLASTY Left     TOTAL KNEE ARTHROPLASTY Right 11/20/2018    TUBAL LIGATION  1979    UPPER GASTROINTESTINAL ENDOSCOPY  11/01/2018    REESE'S Social History:    Social History     Tobacco Use    Smoking status: Former Smoker     Packs/day: 0.50     Years: 40.00     Pack years: 20.00     Quit date: 2014     Years since quittin.4    Smokeless tobacco: Never Used   Substance Use Topics    Alcohol use: Not Currently                                Counseling given: Not Answered      Vital Signs (Current):   Vitals:    21 0955   BP: (!) 182/83   Pulse: 62   Resp: 16   Temp: 97.1 °F (36.2 °C)   TempSrc: Infrared   SpO2: 97%   Weight: 260 lb (117.9 kg)   Height: 5' 7\" (1.702 m)                                              BP Readings from Last 3 Encounters:   21 (!) 182/83   12/10/21 126/69   21 138/71       NPO Status: Time of last liquid consumption:                         Time of last solid consumption:                         Date of last liquid consumption: 21                        Date of last solid food consumption: 21    BMI:   Wt Readings from Last 3 Encounters:   21 260 lb (117.9 kg)   12/10/21 260 lb (117.9 kg)   21 259 lb (117.5 kg)     Body mass index is 40.72 kg/m². CBC:   Lab Results   Component Value Date    WBC 9.7 12/10/2021    RBC 4.41 12/10/2021    RBC 4.50 2012    HGB 13.0 12/10/2021    HCT 39.2 12/10/2021    MCV 88.9 12/10/2021    RDW 15.2 12/10/2021     12/10/2021     2012     LR    CMP:   Lab Results   Component Value Date     12/10/2021    K 4.2 12/10/2021     12/10/2021    CO2 31 12/10/2021    BUN 17 12/10/2021    CREATININE 1.33 12/10/2021    GFRAA 48 12/10/2021    LABGLOM 40 12/10/2021    GLUCOSE 186 12/10/2021    GLUCOSE 117 2012    PROT 7.2 2021    PROT 6.7 2012    CALCIUM 9.8 12/10/2021    BILITOT 0.69 2021    ALKPHOS 61 2021    AST 25 2021    ALT 23 2021       POC Tests: No results for input(s): POCGLU, POCNA, POCK, POCCL, POCBUN, POCHEMO, POCHCT in the last 72 hours.     Coags:   Lab Results   Component Value Date    PROTIME 12.3 07/18/2019    INR 0.9 07/18/2019    APTT 30.4 07/18/2019       HCG (If Applicable):   Lab Results   Component Value Date    PREGTESTUR NEGATIVE 04/08/2013    HCGQUANT 5 (H) 07/13/2017        ABGs: No results found for: PHART, PO2ART, PUO2DJV, WJC9SHM, BEART, J4ZNCMTE     Type & Screen (If Applicable):  No results found for: LABABO, LABRH    Drug/Infectious Status (If Applicable):  No results found for: HIV, HEPCAB    COVID-19 Screening (If Applicable): No results found for: COVID19        Anesthesia Evaluation  Patient summary reviewed and Nursing notes reviewed no history of anesthetic complications:   Airway: Mallampati: II  TM distance: >3 FB   Neck ROM: full  Mouth opening: > = 3 FB Dental:    (+) edentulous      Pulmonary:normal exam  breath sounds clear to auscultation  (+) shortness of breath: no interval change,      (-) pneumonia, COPD, asthma, recent URI, sleep apnea, rhonchi, wheezes, rales, stridor, not a current smoker and no decreased breath sounds          Patient did not smoke on day of surgery.                  Cardiovascular:  Exercise tolerance: good (>4 METS),   (+) hypertension: no interval change, pacemaker: pacemaker, CAD: no interval change, dysrhythmias: atrial fibrillation, CHF: no interval change, NOWAK: no interval change,     (-) valvular problems/murmurs, past MI, CABG/stent,  angina, orthopnea, PND, murmur, weak pulses,  friction rub, systolic click, carotid bruit,  JVD, peripheral edema and no pulmonary hypertension    ECG reviewed  Rhythm: regular  Rate: normal  Echocardiogram reviewed         Beta Blocker:  Dose within 24 Hrs         Neuro/Psych:   (+) neuromuscular disease:, psychiatric history:   (-) seizures, TIA, CVA, headaches and depression/anxiety            GI/Hepatic/Renal:   (+) GERD:,      (-) hiatal hernia, PUD, hepatitis, liver disease, no renal disease, bowel prep and no morbid obesity       Endo/Other:    (+) DiabetesType II DM, , no malignancy/cancer. (-) hypothyroidism, hyperthyroidism, blood dyscrasia, arthritis, no electrolyte abnormalities, no malignancy/cancer               Abdominal:             Vascular: negative vascular ROS. - PVD, DVT and PE. Other Findings:           Anesthesia Plan      general     ASA 3       Induction: intravenous. MIPS: Postoperative opioids intended and Prophylactic antiemetics administered. Anesthetic plan and risks discussed with patient. Plan discussed with CRNA.                   Graciela Alfaro MD   12/23/2021

## 2021-12-23 NOTE — ANESTHESIA POSTPROCEDURE EVALUATION
POST- ANESTHESIA EVALUATION       Pt Name: Jaquelin Washburn  MRN: [id number]  [place]: 1955  Date of evaluation: 12/23/2021  Time:  12:30 PM      BP (!) 140/68   Pulse 66   Temp 98.2 °F (36.8 °C) (Infrared)   Resp 12   Ht 5' 7\" (1.702 m)   Wt 260 lb (117.9 kg)   LMP 06/29/2002 (Within Months)   SpO2 100%   BMI 40.72 kg/m²      Consciousness Level  Awake  Cardiopulmonary Status  Stable  Pain Adequately Treated YES  Nausea / Vomiting  NO  Adequate Hydration  YES  Anesthesia Related Complications NONE      Electronically signed by Cece Preciado MD on 12/23/2021 at 12:30 PM       Department of Anesthesiology  Postprocedure Note    Patient: Jaquelin Washburn  MRN: [id number]  [place]: 1955  Date of evaluation: 12/23/2021  Time:  12:30 PM     Procedure Summary     Date: 12/23/21 Room / Location: [address] [place] [address] 03 / [address] [facility] ENDO    Anesthesia Start: 9023 Anesthesia Stop: 2043    Procedure: EGD BIOPSY (N/A Esophagus) Diagnosis: (REESE'S ESOPHAGUS GERD   (  PT HAS HAD COVID VACCINE))    Surgeons: Niranjan Huynh MD Responsible Provider: Cece Preciado MD    Anesthesia Type: general ASA Status: 3          Anesthesia Type: general    Tatiana Phase I:      Tatiana Phase II:      Last vitals: Reviewed and per EMR flowsheets.        Anesthesia Post Evaluation

## 2021-12-28 LAB — SURGICAL PATHOLOGY REPORT: NORMAL

## 2022-01-19 DIAGNOSIS — K58.1 IRRITABLE BOWEL SYNDROME WITH CONSTIPATION: ICD-10-CM

## 2022-01-20 ENCOUNTER — HOSPITAL ENCOUNTER (OUTPATIENT)
Age: 67
Discharge: HOME | End: 2022-01-20
Payer: MEDICARE

## 2022-01-20 DIAGNOSIS — N18.32 STAGE 3B CHRONIC KIDNEY DISEASE (HCC): ICD-10-CM

## 2022-01-20 DIAGNOSIS — N18.30 SECONDARY DIABETES MELLITUS WITH STAGE 3 CHRONIC KIDNEY DISEASE (HCC): ICD-10-CM

## 2022-01-20 DIAGNOSIS — N18.30 BENIGN HYPERTENSION WITH CKD (CHRONIC KIDNEY DISEASE) STAGE III (HCC): ICD-10-CM

## 2022-01-20 DIAGNOSIS — E13.22 SECONDARY DIABETES MELLITUS WITH STAGE 3 CHRONIC KIDNEY DISEASE (HCC): ICD-10-CM

## 2022-01-20 DIAGNOSIS — I12.9 BENIGN HYPERTENSION WITH CKD (CHRONIC KIDNEY DISEASE) STAGE III (HCC): ICD-10-CM

## 2022-01-20 PROBLEM — R94.39 ABNORMAL STRESS TEST: Status: ACTIVE | Noted: 2021-08-23

## 2022-01-20 PROBLEM — I50.9 CHF (CONGESTIVE HEART FAILURE) (HCC): Status: ACTIVE | Noted: 2022-01-20

## 2022-01-20 LAB
-: ABNORMAL
ABSOLUTE EOS #: 0.3 K/UL (ref 0–0.4)
ABSOLUTE IMMATURE GRANULOCYTE: ABNORMAL K/UL (ref 0–0.3)
ABSOLUTE LYMPH #: 2.3 K/UL (ref 1–4.8)
ABSOLUTE MONO #: 1 K/UL (ref 0.1–1.3)
ALBUMIN SERPL-MCNC: 4.4 G/DL (ref 3.5–5.2)
ALBUMIN/GLOBULIN RATIO: ABNORMAL (ref 1–2.5)
ALP BLD-CCNC: 73 U/L (ref 35–104)
ALT SERPL-CCNC: 18 U/L (ref 5–33)
AMORPHOUS: ABNORMAL
ANION GAP SERPL CALCULATED.3IONS-SCNC: 12 MMOL/L (ref 9–17)
AST SERPL-CCNC: 22 U/L
BACTERIA: ABNORMAL
BASOPHILS # BLD: 1 % (ref 0–2)
BASOPHILS ABSOLUTE: 0.1 K/UL (ref 0–0.2)
BILIRUB SERPL-MCNC: 0.51 MG/DL (ref 0.3–1.2)
BILIRUBIN URINE: NEGATIVE
BUN BLDV-MCNC: 18 MG/DL (ref 8–23)
BUN/CREAT BLD: ABNORMAL (ref 9–20)
CALCIUM SERPL-MCNC: 10.5 MG/DL (ref 8.6–10.4)
CASTS UA: ABNORMAL /LPF
CHLORIDE BLD-SCNC: 103 MMOL/L (ref 98–107)
CO2: 28 MMOL/L (ref 20–31)
COLOR: YELLOW
COMMENT UA: ABNORMAL
CREAT SERPL-MCNC: 1.6 MG/DL (ref 0.5–0.9)
CRYSTALS, UA: ABNORMAL /HPF
DIFFERENTIAL TYPE: ABNORMAL
EOSINOPHILS RELATIVE PERCENT: 3 % (ref 0–4)
EPITHELIAL CELLS UA: ABNORMAL /HPF
GFR AFRICAN AMERICAN: 39 ML/MIN
GFR NON-AFRICAN AMERICAN: 32 ML/MIN
GFR SERPL CREATININE-BSD FRML MDRD: ABNORMAL ML/MIN/{1.73_M2}
GFR SERPL CREATININE-BSD FRML MDRD: ABNORMAL ML/MIN/{1.73_M2}
GLUCOSE BLD-MCNC: 122 MG/DL (ref 70–99)
GLUCOSE URINE: NEGATIVE
HCT VFR BLD CALC: 46.2 % (ref 36–46)
HEMOGLOBIN: 15.3 G/DL (ref 12–16)
IMMATURE GRANULOCYTES: ABNORMAL %
KETONES, URINE: ABNORMAL
LEUKOCYTE ESTERASE, URINE: ABNORMAL
LYMPHOCYTES # BLD: 23 % (ref 24–44)
MAGNESIUM: 2 MG/DL (ref 1.6–2.6)
MCH RBC QN AUTO: 29.2 PG (ref 26–34)
MCHC RBC AUTO-ENTMCNC: 33.1 G/DL (ref 31–37)
MCV RBC AUTO: 88.4 FL (ref 80–100)
MONOCYTES # BLD: 10 % (ref 1–7)
MUCUS: ABNORMAL
NITRITE, URINE: NEGATIVE
NRBC AUTOMATED: ABNORMAL PER 100 WBC
OTHER OBSERVATIONS UA: ABNORMAL
PDW BLD-RTO: 14.5 % (ref 11.5–14.9)
PH UA: 5.5 (ref 5–8)
PHOSPHORUS: 3.7 MG/DL (ref 2.6–4.5)
PLATELET # BLD: 258 K/UL (ref 150–450)
PLATELET ESTIMATE: ABNORMAL
PMV BLD AUTO: 8 FL (ref 6–12)
POTASSIUM SERPL-SCNC: 4.2 MMOL/L (ref 3.7–5.3)
PROTEIN UA: NEGATIVE
RBC # BLD: 5.23 M/UL (ref 4–5.2)
RBC # BLD: ABNORMAL 10*6/UL
RBC UA: ABNORMAL /HPF
RENAL EPITHELIAL, UA: ABNORMAL /HPF
SEG NEUTROPHILS: 63 % (ref 36–66)
SEGMENTED NEUTROPHILS ABSOLUTE COUNT: 6.5 K/UL (ref 1.3–9.1)
SODIUM BLD-SCNC: 143 MMOL/L (ref 135–144)
SPECIFIC GRAVITY UA: 1.02 (ref 1–1.03)
TOTAL PROTEIN: 7.8 G/DL (ref 6.4–8.3)
TRICHOMONAS: ABNORMAL
TURBIDITY: CLEAR
URINE HGB: NEGATIVE
UROBILINOGEN, URINE: NORMAL
WBC # BLD: 10.1 K/UL (ref 3.5–11)
WBC # BLD: ABNORMAL 10*3/UL
WBC UA: ABNORMAL /HPF
YEAST: ABNORMAL

## 2022-01-20 PROCEDURE — 81001 URINALYSIS AUTO W/SCOPE: CPT

## 2022-01-20 PROCEDURE — 83735 ASSAY OF MAGNESIUM: CPT

## 2022-01-20 PROCEDURE — 84100 ASSAY OF PHOSPHORUS: CPT

## 2022-01-20 PROCEDURE — 85025 COMPLETE CBC W/AUTO DIFF WBC: CPT

## 2022-01-20 PROCEDURE — 36415 COLL VENOUS BLD VENIPUNCTURE: CPT

## 2022-01-20 PROCEDURE — 80053 COMPREHEN METABOLIC PANEL: CPT

## 2022-01-20 RX ORDER — LINACLOTIDE 290 UG/1
CAPSULE, GELATIN COATED ORAL
Qty: 30 CAPSULE | Refills: 0 | Status: SHIPPED | OUTPATIENT
Start: 2022-01-20 | End: 2022-02-22

## 2022-02-03 ENCOUNTER — TELEPHONE (OUTPATIENT)
Dept: GASTROENTEROLOGY | Age: 67
End: 2022-02-03

## 2022-02-07 ENCOUNTER — TELEPHONE (OUTPATIENT)
Dept: GASTROENTEROLOGY | Age: 67
End: 2022-02-07

## 2022-02-07 NOTE — TELEPHONE ENCOUNTER
Patient stated that doctor had given her a list of foods that she should not eat. Would like this list put in mychart for her. Thinks that it may have been for her IBS.

## 2022-02-17 NOTE — TELEPHONE ENCOUNTER
Writer spoke with patient and she would like the ppw we mailed to her . Patient will be sent a note for her to keep a food journial so she can find out what foods trigger her IBS.

## 2022-02-21 DIAGNOSIS — K58.1 IRRITABLE BOWEL SYNDROME WITH CONSTIPATION: ICD-10-CM

## 2022-02-22 RX ORDER — LINACLOTIDE 290 UG/1
CAPSULE, GELATIN COATED ORAL
Qty: 30 CAPSULE | Refills: 0 | Status: SHIPPED | OUTPATIENT
Start: 2022-02-22 | End: 2022-04-29

## 2022-04-15 ENCOUNTER — OFFICE VISIT (OUTPATIENT)
Dept: GASTROENTEROLOGY | Age: 67
End: 2022-04-15
Payer: MEDICARE

## 2022-04-15 VITALS
SYSTOLIC BLOOD PRESSURE: 141 MMHG | TEMPERATURE: 96.8 F | DIASTOLIC BLOOD PRESSURE: 74 MMHG | WEIGHT: 263 LBS | HEART RATE: 60 BPM | BODY MASS INDEX: 41.19 KG/M2

## 2022-04-15 DIAGNOSIS — K57.30 SIGMOID DIVERTICULOSIS: ICD-10-CM

## 2022-04-15 DIAGNOSIS — K22.70 BARRETT'S ESOPHAGUS WITHOUT DYSPLASIA: ICD-10-CM

## 2022-04-15 DIAGNOSIS — K56.699 SIGMOID STRICTURE (HCC): ICD-10-CM

## 2022-04-15 DIAGNOSIS — K22.2 SCHATZKI'S RING: ICD-10-CM

## 2022-04-15 DIAGNOSIS — K44.9 HIATAL HERNIA: ICD-10-CM

## 2022-04-15 DIAGNOSIS — K58.8 OTHER IRRITABLE BOWEL SYNDROME: ICD-10-CM

## 2022-04-15 DIAGNOSIS — K21.9 GASTROESOPHAGEAL REFLUX DISEASE, UNSPECIFIED WHETHER ESOPHAGITIS PRESENT: ICD-10-CM

## 2022-04-15 DIAGNOSIS — E66.01 SEVERE OBESITY (BMI 35.0-39.9) WITH COMORBIDITY (HCC): Primary | ICD-10-CM

## 2022-04-15 PROCEDURE — 99214 OFFICE O/P EST MOD 30 MIN: CPT | Performed by: INTERNAL MEDICINE

## 2022-04-15 ASSESSMENT — ENCOUNTER SYMPTOMS
DIARRHEA: 1
CHOKING: 0
NAUSEA: 0
VOMITING: 0
SORE THROAT: 0
RECTAL PAIN: 0
ABDOMINAL DISTENTION: 1
ANAL BLEEDING: 0
BLOOD IN STOOL: 0
COUGH: 0
ABDOMINAL PAIN: 1
WHEEZING: 0
SHORTNESS OF BREATH: 0
TROUBLE SWALLOWING: 1
CONSTIPATION: 1
VOICE CHANGE: 0

## 2022-04-15 NOTE — PROGRESS NOTES
GI CLINIC FOLLOW UP    NTERVAL HISTORY:   No referring provider defined for this encounter. Chief Complaint   Patient presents with    Follow-up     Pt is here for a f/u on EGD proc. 1. Severe obesity (BMI 35.0-39. 9) with comorbidity ([address]    2. Sigmoid stricture ([address]    3. Sigmoid diverticulosis    4. Gastroesophageal reflux disease, unspecified whether esophagitis present    5. Other irritable bowel syndrome    6. Guerrero's esophagus without dysplasia    7. Hiatal hernia    8. Schatzki's ring       The patient is here as a follow up of her recent GI procedure. The results have been sent to you separately   The findings were explained to the patient in detail and biopsies were also discussed   with her    This patient had a recent upper endoscopy done by me for the purpose of Guerrero's off because she was found to have 2 cm salmon-colored mucosa consistent with intestinal metaplasia as well as was found to have Schatzki's ring and small hiatus hernia    Four-quadrant biopsies were taken she is clinically feeling better    She is planning to have a bladder surgery which is to phase in the near future she has history for sigmoid stricture with presence of large number of diverticuli she will be due for colonoscopy in near future as well we will tentatively plan for next year        Patient has been on [address] [place], [place] [address]Praccel for constipation seems to be helping her    Denies any overt rectal bleeding melanotic stools  Previous records and charts were all reviewed with her    Has been on blood thinner from cardiology has history for congestive heart failure  She has obesity    No smoking alcohol abuse illicit drug usage      HISTORY OF PRESENT ILLNESS: Ish Banda is a 77 y.o. female with a past history remarkable for , referred for evaluation of   Chief Complaint   Patient presents with    Follow-up     Pt is here for a f/u on EGD proc. Gianni Hackett     Past Medical,Family, and Social History reviewed and does contribute to the patient presenting condition. Patient's PMH/PSH,SH,PSYCH Hx, MEDs, ALLERGIES, and ROS were all reviewed and updated in the appropriate sections. PAST MEDICAL HISTORY:  Past Medical History:   Diagnosis Date    Anemia     Arthritis     back    Atrial fibrillation (HCC)     on Eliquis for    Guerrero esophagus 11/01/2018    Bruises easily     CAD S/P percutaneous coronary angioplasty 06/08/2017    cardiac stent x1 5-29-09    CHF (congestive heart failure) ([address] 08/23/2021    [facility] Congest[facility] heart clinic.  Chronic back pain, KNEE PAIN AND NECK PAIN.  CKD (chronic kidney disease) stage 3, GFR 30-59 ml/min (HCC)     Colon polyp 04/16/2019    hyperplastic polyp    COVID-19 vaccine administered 12-,4-,328-2021    Pfizer    DDD (degenerative disc disease)     DDD (degenerative disc disease)     Depression     Diverticulitis     Fibromyalgia     Full dentures     GERD (gastroesophageal reflux disease)     H/O dilation and curettage 07/17/2017    History of blood transfusion     no problems    History of myocardial infarction 05/2009    Hx of blood clots     left kidney    Hypercholesterolemia     Hypertension     IBS (irritable bowel syndrome)     MRSA (methicillin resistant staph aureus) culture positive     2018 in [facility]    Neuropathy     Nocturia     Obesity     Post-menopausal bleeding     Short of breath on exertion     Sick sinus syndrome ([address]     Pacemaker placed 6-    Spinal stenosis     Thyroid nodule     Tobacco abuse     'quit 7 yrs ago. \" Quit August 2014\"    Type 2 diabetes mellitus without complication, without long-term current use of insulin ([address] 06/08/2017    Wears glasses        Past Surgical History:   Procedure Laterality Date    BREAST BIOPSY  2005    Left---Benign    CARDIAC CATHETERIZATION  06/20/2016    no intervention    COLONOSCOPY  2011    diverticulitis    COLONOSCOPY N/A 4/16/2019 hyperplastic polyp    CORONARY ANGIOPLASTY WITH STENT PLACEMENT  05/2009    X 1    DILATION AND CURETTAGE OF UTERUS N/A 7/17/2017    DILATATION AND CURETTAGE OPERATIVE HYSTEROSCOPY WITH MYOSURE, POLYPECTOMY, MYOMECTOMY performed by Carmella Bryan DO at [address] N/A 7/25/2019    DILATATION AND CURETTAGE HYSTEROSCOPY performed by Carmella Bryan DO at [address] HYSTEROSCOPY  12/16/13    Operative Hscope with endometrial sampling, polypectomy & myomectomy [facility]    HYSTEROSCOPY  07/17/2017    PACEMAKER PLACEMENT  06/15/2021    Sick sinus syndrome    [place] OFFICE/OUTPT VISIT,PROCEDURE ONLY N/A 1/10/2018    DILATATION AND CURETTAGE HYSTEROSCOPY performed by Carmella Bryan DO at [address] [facility]  NEEDLE BIOPSY      TONSILLECTOMY  age 22   [place] TOTAL KNEE ARTHROPLASTY Left     TOTAL KNEE ARTHROPLASTY Right 11/20/2018    TUBAL LIGATION  1979    UPPER GASTROINTESTINAL ENDOSCOPY  11/01/2018    [facility]    UPPER GASTROINTESTINAL ENDOSCOPY N/A 12/23/2021    EGD BIOPSY performed by Therese Pearson MD at [address]:    Current Outpatient Medications:     LINZESS 290 MCG CAPS capsule, TAKE 1 CAPSULE BY MOUTH ONCE DAILY IN THE MORNING BEFORE BREAKFAST, Disp: 30 capsule, Rfl: 0    linagliptin (TRADJENTA) 5 MG tablet, Take 5 mg by mouth daily, Disp: , Rfl:     tiZANidine (ZANAFLEX) 4 MG tablet, TAKE 1 TABLET BY MOUTH EVERY 8 HOURS AS NEEDED FOR MUSCLE SPASM, Disp: , Rfl:     furosemide (LASIX) 20 MG tablet, Take 40 mg daily twice a week, the other days take 20 mg daily. , Disp: 45 tablet, Rfl: 6    apixaban (ELIQUIS) 5 MG TABS tablet, Take 5 mg by mouth 2 times daily, Disp: , Rfl:     aspirin 81 MG EC tablet, Take 81 mg by mouth daily, Disp: , Rfl:     cetirizine (ZYRTEC) 10 MG tablet, Take 1 tablet by mouth nightly, Disp: , Rfl:     vitamin D (CHOLECALCIFEROL) 25 MCG (1000 UT) TABS tablet, Take 1,000 Units by mouth daily, Disp: , Rfl:     co-enzyme Q-10 30 MG capsule, Take 30 mg by mouth 3 times daily, Disp: , Rfl:     dronedarone hcl (MULTAQ) 400 MG TABS, Take 400 mg by mouth 2 times daily (with meals), Disp: , Rfl:     clotrimazole-betamethasone (LOTRISONE) 1-0.05 % cream, APPLY  CREAM TOPICALLY TO AFFECTED AREA TWICE DAILY FOR 4 DAYS AND THEN ONCE DAILY FOR 3 DAYS, Disp: 15 g, Rfl: 0    Multiple Vitamin (MULTI VITAMIN DAILY PO), Take by mouth, Disp: , Rfl:     calcium citrate-vitamin D (CITRICAL + D) 315-250 MG-UNIT TABS per tablet, Take 1 tablet by mouth 2 times daily (with meals) , Disp: , Rfl:     ONETOUCH VERIO strip, USE 1 STRIP TO CHECK GLUCOSE TWICE DAILY, Disp: , Rfl:     Lancets (ONETOUCH DELICA PLUS [id number]) MISC, USE 1 TO CHECK GLUCOSE 4 TIMES DAILY, Disp: , Rfl:     fluticasone (FLONASE) 50 MCG/ACT nasal spray, 1 spray by Each Nostril route daily as needed for Rhinitis , Disp: , Rfl:     Probiotic Product (PROBIOTIC ADVANCED PO), Take by mouth daily, Disp: , Rfl:     albuterol sulfate HFA (PROVENTIL HFA) 108 (90 Base) MCG/ACT inhaler, Inhale 2 puffs into the lungs , Disp: , Rfl:     Biotin 10 MG CAPS, Take by mouth, Disp: , Rfl:     vitamin E 400 UNIT capsule, Take 400 Units by mouth 2 times daily, Disp: , Rfl:     pantoprazole (PROTONIX) 40 MG tablet, Take 40 mg by mouth daily, Disp: , Rfl:     Cyanocobalamin (VITAMIN B 12 PO), Take 500 mg by mouth, Disp: , Rfl:     ferrous sulfate 325 (65 Fe) MG EC tablet, Take 325 mg by mouth 3 times daily (with meals), Disp: , Rfl:     isosorbide mononitrate (IMDUR) 60 MG extended release tablet, Take 90 mg by mouth daily 1 1/2 tablet daily, Disp: , Rfl:     HYDROcodone-acetaminophen (NORCO) 5-325 MG per tablet, Take 1 tablet by mouth every 12 hours as needed for Pain., Disp: , Rfl:     DULoxetine (CYMBALTA) 30 MG extended release capsule, Take 60 mg by mouth 2 times daily , Disp: , Rfl:     nitroGLYCERIN (NITROSTAT) 0.4 MG SL tablet, Place 0.4 mg under the tongue every 5 minutes as needed for Chest pain up to max of 3 total doses. If no relief after 1 dose, call 911., Disp: , Rfl:     metoprolol (LOPRESSOR) 50 MG tablet, Take 50 mg by mouth 2 times daily. , Disp: , Rfl:     atorvastatin (LIPITOR) 80 MG tablet, Take 80 mg by mouth daily.   , Disp: , Rfl:     ALLERGIES:   Allergies   Allergen Reactions    Adhesive Tape Rash    Pcn [Penicillins] Hives    Amlodipine      Leg swelling    Codeine Other (See Comments)     Stomach cramps    Gabapentin Other (See Comments)     Causes hands shake and unable to hold anything    Lyrica [Pregabalin] Other (See Comments)     \"trouble concentrating\"    Oxycodone Hives    Sulfa Antibiotics Other (See Comments)     Cramps    Other Rash     white       FAMILY HISTORY:       Problem Relation Age of Onset    Heart Attack Father     Diabetes Mother     Other Mother         brain bleed    Other Daughter         blood clotting disorder    Breast Cancer Neg Hx     Cancer Neg Hx     Colon Cancer Neg Hx     Eclampsia Neg Hx     Hypertension Neg Hx     Ovarian Cancer Neg Hx      Labor Neg Hx     Spont Abortions Neg Hx     Stroke Neg Hx     Heart Disease Neg Hx          SOCIAL HISTORY:   Social History     Socioeconomic History    Marital status:      Spouse name: Not on file    Number of children: Not on file    Years of education: Not on file    Highest education level: Not on file   Occupational History    Not on file   Tobacco Use    Smoking status: Former Smoker     Packs/day: 0.50     Years: 40.00     Pack years: 20.00     Quit date: 2014     Years since quittin.7    Smokeless tobacco: Never Used   Vaping Use    Vaping Use: Never used   Substance and Sexual Activity    Alcohol use: Not Currently    Drug use: No    Sexual activity: Never     Birth control/protection: Post-menopausal   Other Topics Concern    Not on file   Social History Narrative    Not on file     Social Determinants of Health     Financial Resource Strain:     Difficulty of Paying Living Expenses: Not on file   Food Insecurity:     Worried About Running Out of Food in the Last Year: Not on file    [place] of Food in the Last Year: Not on file   Transportation Needs:     Lack of Transportation (Medical): Not on file    Lack of Transportation (Non-Medical): Not on file   Physical Activity:     Days of Exercise per Week: Not on file    Minutes of Exercise per Session: Not on file   Stress:     Feeling of Stress : Not on file   Social Connections:     Frequency of Communication with Friends and Family: Not on file    Frequency of Social Gatherings with Friends and Family: Not on file    Attends [employer] Services: Not on file    Active Member of [address] [place] [address] or Organizations: Not on file    Attends Club or Organization Meetings: Not on file    Marital Status: Not on file   Intimate Partner Violence:     Fear of Current or Ex-Partner: Not on file    Emotionally Abused: Not on file    Physically Abused: Not on file    Sexually Abused: Not on file   Housing Stability:     Unable to Pay for Housing in the Last Year: Not on file    Number of [place] in the Last Year: Not on file    Unstable Housing in the Last Year: Not on file         REVIEW OF SYSTEMS:         Review of Systems   Constitutional: Negative for appetite change, fatigue and unexpected weight change. HENT: Positive for trouble swallowing (Occasional). Negative for sore throat and voice change. Respiratory: Negative for cough, choking, shortness of breath and wheezing. Cardiovascular: Positive for chest pain and leg swelling (Occasional). Negative for palpitations. Gastrointestinal: Positive for abdominal distention, abdominal pain, constipation and diarrhea. Negative for anal bleeding, blood in stool, nausea, rectal pain and vomiting. Neurological: Positive for dizziness and light-headedness. Negative for weakness, numbness and headaches. Hematological: Does not bruise/bleed easily. Psychiatric/Behavioral: Negative for confusion and sleep disturbance. The patient is not nervous/anxious. PHYSICAL EXAMINATION: Vital signs reviewed per the nursing documentation. BP (!) 141/74   Pulse 60   Temp 96.8 °F (36 °C)   Wt 263 lb (119.3 kg)   LMP 06/29/2002 (Within Months)   BMI 41.19 kg/m²   Body mass index is 41.19 kg/m². Physical Exam  Nursing note reviewed. Constitutional:       Appearance: She is well-developed. Comments: Anxious   Obesity   HENT:      Head: Normocephalic and atraumatic. Eyes:      Conjunctiva/sclera: Conjunctivae normal.      Pupils: Pupils are equal, round, and reactive to light. Cardiovascular:      Heart sounds: Normal heart sounds. Pulmonary:      Effort: Pulmonary effort is normal.      Breath sounds: Normal breath sounds. Abdominal:      General: Bowel sounds are normal.      Palpations: Abdomen is soft. Comments: NON TENDER, NON DISTENTED  LIVER SPLEEN AND HERNIAS ARE NOT  PALPABLE  BOWEL SOUNDS ARE POSITIVE   Obese abdomen   Musculoskeletal:         General: Normal range of motion. Cervical back: Normal range of motion and neck supple. Comments: Mild edema   Skin:     General: Skin is warm. Neurological:      Mental Status: She is alert and oriented to person, place, and time.    Psychiatric:         Behavior: Behavior normal.           LABORATORY DATA: Reviewed  Lab Results   Component Value Date    WBC 10.1 01/20/2022    HGB 15.3 01/20/2022    HCT 46.2 (H) 01/20/2022    MCV 88.4 01/20/2022     01/20/2022     03/09/2022    K 4.1 03/09/2022     03/09/2022    CO2 29 03/09/2022    BUN 17 03/09/2022    CREATININE 1.72 03/09/2022    LABALBU 4.4 01/20/2022    BILITOT 0.51 01/20/2022    ALKPHOS 73 01/20/2022    AST 22 01/20/2022    ALT 18 01/20/2022    INR 0.9 07/18/2019         Lab Results   Component Value Date    RBC 5.23 (H) 01/20/2022    HGB 15.3 01/20/2022 MCV 88.4 01/20/2022    MCH 29.2 01/20/2022    MCHC 33.1 01/20/2022    RDW 14.5 01/20/2022    MPV 8.0 01/20/2022    BASOPCT 1 01/20/2022    LYMPHSABS 2.30 01/20/2022    MONOSABS 1.00 01/20/2022    NEUTROABS 6.50 01/20/2022    EOSABS 0.30 01/20/2022    BASOSABS 0.10 01/20/2022         DIAGNOSTIC TESTING:     No results found. Assessment  1. Severe obesity (BMI 35.0-39. 9) with comorbidity ([address]    2. Sigmoid stricture ([facility]    3. Sigmoid diverticulosis    4. Gastroesophageal reflux disease, unspecified whether esophagitis present    5. Other irritable bowel syndrome    6. Guerrero's esophagus without dysplasia    7. Hiatal hernia    8. Schatzki's ring        Plan    Cont PPI    Pt was discussed in detail about the possible side effects of proton pump inhibiter therapy. She was explained about the possibility of calcium and magnesium malabsorption and was advised to start taking calcium supplements with Vit D. Some over the counter regimens were explained to patient. Some dietary advices were also given. She has verbalized understanding and agreement to this. Pt seems to have signs and symptoms consistent with GERD, acid indigestion and heartburns. She was discussed  in detail about some possible life style and dietary modifications. She was stressed about the maintenance  of appropriate weight and effect of obesity contributing to reflux symptoms. Routine exercise was streesed. Avoidance of Caffeine, nicotine and chocolate were explained. Pt was asked to avoid spices grease and fried food. Advices were also given about avoidance of any kind of fast foods, soda pops and high energy drinks. Pt was advised to place two small block under the head end of the bed which may help with night time reflux. Was advised not to eat any thin at least 2-3 hrs before going to bed and walk especially after dinner    Pt has verbalized understanding and agreement to this plan.     She is going for bladder surgery two phase    She is non blood thinner      Will tentatively plan colon for next year    Cont Linzess for constipation     Pt was given instructions and advice in detail about the symptom of constipation. She was explained about avoidance of fast food, soda pops, cheese and red meat. Was also told to avoid sedatives narcotics and pain killers if possible. Pt was advised to start drinking ample amount of water and liquid. Was told to adapt and follow an exercise regimen. Instructions were given to increase the amount of fiber including dietary in terms of bran, cereals, whole wheat, brown bread etc. Was also instructed to start using supplemental fiber either Metamucil, citrucell or bennafiber with ample liquids. She was told to start drinking prune juice which is good for constipation. If symptoms don't resolve she will require medicines to assist with her symptoms    Pt has verbalized understanding and agreement to this plan. The patient was instructed to start taking some OTC Probiotics products   These are available over the counter at the Pharmacy stores and Grocery stores  He was explained about the beneficial effects they have in the GI track  They will help to establish the good bacterial orsy and will help with the digestion and bowel movements  The patient has verbalized understanding and agreement to this plan    Pt was given advices and instructions about weight loss. She was advised about the significance of exercise at least three times a week . Dietary advices were also given about the avoidance of fast food, fried food and lots of spices and grease. nstructions were given about using ample amount of fiber including dietary and supplemental fiber either metamucil, bennafiber or citrucell etc.  Pt was advised about drinking ample amount of water without any colors or chemicals. Stress was given about regular exercise. Pt was told to stay way from soda pops.     Pt has verbalized understanding and agrrement    More than half of patient's clinic visit time was spent in counseling about lifestyle and dietary modifications  Patient's  questions were answered in this regard as well  The patient has verbalized understanding and agreement             Thank you for allowing me to participate in the care of Ms. Andrew Madera. For any further questions please do not hesitate to contact me. I have reviewed and agree with the ROS entered by the MA/Nurse.          Johnathan Weber MD, Thierry Verduzco  Board Certified in Gastroenterology and [address] [place] [address] Gastroenterology  Office #: [phone]

## 2022-04-29 DIAGNOSIS — K58.1 IRRITABLE BOWEL SYNDROME WITH CONSTIPATION: ICD-10-CM

## 2022-04-29 RX ORDER — LINACLOTIDE 290 UG/1
CAPSULE, GELATIN COATED ORAL
Qty: 30 CAPSULE | Refills: 2 | Status: SHIPPED | OUTPATIENT
Start: 2022-04-29 | End: 2022-09-26

## 2022-07-07 ENCOUNTER — HOSPITAL ENCOUNTER (OUTPATIENT)
Age: 67
Discharge: HOME | End: 2022-07-07
Payer: MEDICARE

## 2022-07-07 DIAGNOSIS — N18.30 SECONDARY DIABETES MELLITUS WITH STAGE 3 CHRONIC KIDNEY DISEASE (HCC): ICD-10-CM

## 2022-07-07 DIAGNOSIS — N18.32 STAGE 3B CHRONIC KIDNEY DISEASE (HCC): ICD-10-CM

## 2022-07-07 DIAGNOSIS — E13.22 SECONDARY DIABETES MELLITUS WITH STAGE 3 CHRONIC KIDNEY DISEASE (HCC): ICD-10-CM

## 2022-07-07 DIAGNOSIS — R82.998 LEUKOCYTES IN URINE: ICD-10-CM

## 2022-07-07 DIAGNOSIS — N18.30 BENIGN HYPERTENSION WITH CKD (CHRONIC KIDNEY DISEASE) STAGE III (HCC): ICD-10-CM

## 2022-07-07 DIAGNOSIS — I12.9 BENIGN HYPERTENSION WITH CKD (CHRONIC KIDNEY DISEASE) STAGE III (HCC): ICD-10-CM

## 2022-07-07 LAB
ABSOLUTE EOS #: 0.4 K/UL (ref 0–0.4)
ABSOLUTE LYMPH #: 2 K/UL (ref 1–4.8)
ABSOLUTE MONO #: 0.8 K/UL (ref 0.1–1.3)
ALBUMIN SERPL-MCNC: 4.2 G/DL (ref 3.5–5.2)
ALP BLD-CCNC: 78 U/L (ref 35–104)
ALT SERPL-CCNC: 23 U/L (ref 5–33)
ANION GAP SERPL CALCULATED.3IONS-SCNC: 12 MMOL/L (ref 9–17)
AST SERPL-CCNC: 26 U/L
BACTERIA: ABNORMAL
BASOPHILS # BLD: 1 % (ref 0–2)
BASOPHILS ABSOLUTE: 0.1 K/UL (ref 0–0.2)
BILIRUB SERPL-MCNC: 0.53 MG/DL (ref 0.3–1.2)
BILIRUBIN URINE: NEGATIVE
BUN BLDV-MCNC: 17 MG/DL (ref 8–23)
CALCIUM SERPL-MCNC: 10 MG/DL (ref 8.6–10.4)
CASTS UA: ABNORMAL /LPF
CHLORIDE BLD-SCNC: 103 MMOL/L (ref 98–107)
CO2: 23 MMOL/L (ref 20–31)
COLOR: ABNORMAL
CREAT SERPL-MCNC: 1.47 MG/DL (ref 0.5–0.9)
EOSINOPHILS RELATIVE PERCENT: 4 % (ref 0–4)
EPITHELIAL CELLS UA: ABNORMAL /HPF
GFR AFRICAN AMERICAN: 43 ML/MIN
GFR NON-AFRICAN AMERICAN: 36 ML/MIN
GFR SERPL CREATININE-BSD FRML MDRD: ABNORMAL ML/MIN/{1.73_M2}
GLUCOSE BLD-MCNC: 249 MG/DL (ref 70–99)
GLUCOSE URINE: ABNORMAL
HCT VFR BLD CALC: 42.5 % (ref 36–46)
HEMOGLOBIN: 14.3 G/DL (ref 12–16)
KETONES, URINE: ABNORMAL
LEUKOCYTE ESTERASE, URINE: ABNORMAL
LYMPHOCYTES # BLD: 20 % (ref 24–44)
MAGNESIUM: 2.1 MG/DL (ref 1.6–2.6)
MCH RBC QN AUTO: 31.4 PG (ref 26–34)
MCHC RBC AUTO-ENTMCNC: 33.7 G/DL (ref 31–37)
MCV RBC AUTO: 93.3 FL (ref 80–100)
MONOCYTES # BLD: 8 % (ref 1–7)
NITRITE, URINE: NEGATIVE
PDW BLD-RTO: 14.3 % (ref 11.5–14.9)
PH UA: 5 (ref 5–8)
PHOSPHORUS: 2.3 MG/DL (ref 2.6–4.5)
PLATELET # BLD: 233 K/UL (ref 150–450)
PMV BLD AUTO: 8.7 FL (ref 6–12)
POTASSIUM SERPL-SCNC: 4.1 MMOL/L (ref 3.7–5.3)
PROTEIN UA: NEGATIVE
RBC # BLD: 4.56 M/UL (ref 4–5.2)
RBC UA: ABNORMAL /HPF
SEG NEUTROPHILS: 67 % (ref 36–66)
SEGMENTED NEUTROPHILS ABSOLUTE COUNT: 6.6 K/UL (ref 1.3–9.1)
SODIUM BLD-SCNC: 138 MMOL/L (ref 135–144)
SPECIFIC GRAVITY UA: 1.02 (ref 1–1.03)
TOTAL PROTEIN: 7.6 G/DL (ref 6.4–8.3)
TURBIDITY: CLEAR
URINE HGB: NEGATIVE
UROBILINOGEN, URINE: NORMAL
WBC # BLD: 9.8 K/UL (ref 3.5–11)
WBC UA: ABNORMAL /HPF

## 2022-07-07 PROCEDURE — 83735 ASSAY OF MAGNESIUM: CPT

## 2022-07-07 PROCEDURE — 80053 COMPREHEN METABOLIC PANEL: CPT

## 2022-07-07 PROCEDURE — 84100 ASSAY OF PHOSPHORUS: CPT

## 2022-07-07 PROCEDURE — 36415 COLL VENOUS BLD VENIPUNCTURE: CPT

## 2022-07-07 PROCEDURE — 85025 COMPLETE CBC W/AUTO DIFF WBC: CPT

## 2022-07-07 PROCEDURE — 81001 URINALYSIS AUTO W/SCOPE: CPT

## 2022-09-24 DIAGNOSIS — K58.1 IRRITABLE BOWEL SYNDROME WITH CONSTIPATION: ICD-10-CM

## 2022-09-26 RX ORDER — LINACLOTIDE 290 UG/1
CAPSULE, GELATIN COATED ORAL
Qty: 30 CAPSULE | Refills: 5 | Status: SHIPPED | OUTPATIENT
Start: 2022-09-26

## 2022-10-06 ENCOUNTER — HOSPITAL ENCOUNTER (OUTPATIENT)
Age: 67
Discharge: HOME | End: 2022-10-06
Payer: MEDICARE

## 2022-10-06 DIAGNOSIS — R82.998 LEUKOCYTES IN URINE: ICD-10-CM

## 2022-10-06 DIAGNOSIS — N18.32 STAGE 3B CHRONIC KIDNEY DISEASE (HCC): ICD-10-CM

## 2022-10-06 DIAGNOSIS — N18.30 BENIGN HYPERTENSION WITH CKD (CHRONIC KIDNEY DISEASE) STAGE III (HCC): ICD-10-CM

## 2022-10-06 DIAGNOSIS — N18.30 SECONDARY DIABETES MELLITUS WITH STAGE 3 CHRONIC KIDNEY DISEASE (HCC): ICD-10-CM

## 2022-10-06 DIAGNOSIS — I12.9 BENIGN HYPERTENSION WITH CKD (CHRONIC KIDNEY DISEASE) STAGE III (HCC): ICD-10-CM

## 2022-10-06 DIAGNOSIS — E13.22 SECONDARY DIABETES MELLITUS WITH STAGE 3 CHRONIC KIDNEY DISEASE (HCC): ICD-10-CM

## 2022-10-06 LAB
ABSOLUTE EOS #: 0.3 K/UL (ref 0–0.4)
ABSOLUTE LYMPH #: 2.3 K/UL (ref 1–4.8)
ABSOLUTE MONO #: 0.8 K/UL (ref 0.1–1.3)
ALBUMIN SERPL-MCNC: 4.3 G/DL (ref 3.5–5.2)
ALP BLD-CCNC: 85 U/L (ref 35–104)
ALT SERPL-CCNC: 22 U/L (ref 5–33)
ANION GAP SERPL CALCULATED.3IONS-SCNC: 11 MMOL/L (ref 9–17)
AST SERPL-CCNC: 23 U/L
BACTERIA: ABNORMAL
BASOPHILS # BLD: 1 % (ref 0–2)
BASOPHILS ABSOLUTE: 0.1 K/UL (ref 0–0.2)
BILIRUB SERPL-MCNC: 0.4 MG/DL (ref 0.3–1.2)
BILIRUBIN URINE: NEGATIVE
BUN BLDV-MCNC: 19 MG/DL (ref 8–23)
CALCIUM SERPL-MCNC: 10.1 MG/DL (ref 8.6–10.4)
CHLORIDE BLD-SCNC: 103 MMOL/L (ref 98–107)
CO2: 26 MMOL/L (ref 20–31)
COLOR: ABNORMAL
CREAT SERPL-MCNC: 1.55 MG/DL (ref 0.5–0.9)
CREATININE URINE: 191.3 MG/DL (ref 28–217)
EOSINOPHILS RELATIVE PERCENT: 3 % (ref 0–4)
EPITHELIAL CELLS UA: ABNORMAL /HPF
GFR SERPL CREATININE-BSD FRML MDRD: 37 ML/MIN/1.73M2
GLUCOSE BLD-MCNC: 154 MG/DL (ref 70–99)
GLUCOSE URINE: NEGATIVE
HCT VFR BLD CALC: 42 % (ref 36–46)
HEMOGLOBIN: 14.2 G/DL (ref 12–16)
KETONES, URINE: NEGATIVE
LEUKOCYTE ESTERASE, URINE: ABNORMAL
LYMPHOCYTES # BLD: 24 % (ref 24–44)
MAGNESIUM: 1.9 MG/DL (ref 1.6–2.6)
MCH RBC QN AUTO: 30.7 PG (ref 26–34)
MCHC RBC AUTO-ENTMCNC: 33.7 G/DL (ref 31–37)
MCV RBC AUTO: 90.9 FL (ref 80–100)
MONOCYTES # BLD: 9 % (ref 1–7)
NITRITE, URINE: POSITIVE
PDW BLD-RTO: 14.4 % (ref 11.5–14.9)
PH UA: 5 (ref 5–8)
PHOSPHORUS: 3.6 MG/DL (ref 2.6–4.5)
PLATELET # BLD: 239 K/UL (ref 150–450)
PMV BLD AUTO: 8.2 FL (ref 6–12)
POTASSIUM SERPL-SCNC: 4.3 MMOL/L (ref 3.7–5.3)
PROTEIN UA: NEGATIVE
RBC # BLD: 4.62 M/UL (ref 4–5.2)
RBC UA: ABNORMAL /HPF
SEG NEUTROPHILS: 63 % (ref 36–66)
SEGMENTED NEUTROPHILS ABSOLUTE COUNT: 6.1 K/UL (ref 1.3–9.1)
SODIUM BLD-SCNC: 140 MMOL/L (ref 135–144)
SPECIFIC GRAVITY UA: 1.02 (ref 1–1.03)
T3 FREE: 2.57 PG/ML (ref 2.02–4.43)
THYROXINE, FREE: 0.98 NG/DL (ref 0.93–1.7)
TOTAL PROTEIN, URINE: 24 MG/DL
TOTAL PROTEIN: 7.2 G/DL (ref 6.4–8.3)
TSH SERPL DL<=0.05 MIU/L-ACNC: 3.91 UIU/ML (ref 0.3–5)
TURBIDITY: CLEAR
URINE HGB: NEGATIVE
URINE TOTAL PROTEIN CREATININE RATIO: 0.13 (ref 0–0.2)
UROBILINOGEN, URINE: NORMAL
WBC # BLD: 9.7 K/UL (ref 3.5–11)
WBC UA: ABNORMAL /HPF

## 2022-10-06 PROCEDURE — 84439 ASSAY OF FREE THYROXINE: CPT

## 2022-10-06 PROCEDURE — 84156 ASSAY OF PROTEIN URINE: CPT

## 2022-10-06 PROCEDURE — 85025 COMPLETE CBC W/AUTO DIFF WBC: CPT

## 2022-10-06 PROCEDURE — 84100 ASSAY OF PHOSPHORUS: CPT

## 2022-10-06 PROCEDURE — 81001 URINALYSIS AUTO W/SCOPE: CPT

## 2022-10-06 PROCEDURE — 84481 FREE ASSAY (FT-3): CPT

## 2022-10-06 PROCEDURE — 84443 ASSAY THYROID STIM HORMONE: CPT

## 2022-10-06 PROCEDURE — 80053 COMPREHEN METABOLIC PANEL: CPT

## 2022-10-06 PROCEDURE — 36415 COLL VENOUS BLD VENIPUNCTURE: CPT

## 2022-10-06 PROCEDURE — 83735 ASSAY OF MAGNESIUM: CPT

## 2022-10-06 PROCEDURE — 82570 ASSAY OF URINE CREATININE: CPT

## 2022-10-26 ENCOUNTER — HOSPITAL ENCOUNTER (OUTPATIENT)
Dept: WOMENS IMAGING | Age: 67
Discharge: HOME | End: 2022-10-28
Payer: MEDICARE

## 2022-10-26 DIAGNOSIS — Z12.31 ENCOUNTER FOR SCREENING MAMMOGRAM FOR BREAST CANCER: ICD-10-CM

## 2022-10-26 PROCEDURE — 77067 SCR MAMMO BI INCL CAD: CPT

## 2022-11-14 ENCOUNTER — HOSPITAL ENCOUNTER (EMERGENCY)
Age: 67
Discharge: HOME | End: 2022-11-14
Attending: EMERGENCY MEDICINE
Payer: MEDICARE

## 2022-11-14 ENCOUNTER — APPOINTMENT (OUTPATIENT)
Dept: GENERAL RADIOLOGY | Age: 67
End: 2022-11-14
Payer: MEDICARE

## 2022-11-14 VITALS
SYSTOLIC BLOOD PRESSURE: 139 MMHG | HEART RATE: 63 BPM | DIASTOLIC BLOOD PRESSURE: 46 MMHG | RESPIRATION RATE: 16 BRPM | OXYGEN SATURATION: 93 % | TEMPERATURE: 97.5 F

## 2022-11-14 DIAGNOSIS — S61.259A INFECTED CAT BITE OF FINGER, INITIAL ENCOUNTER: Primary | ICD-10-CM
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
## 2022-11-14 DIAGNOSIS — L08.9 INFECTED CAT BITE OF FINGER, INITIAL ENCOUNTER: Primary | ICD-10-CM

## 2022-11-14 PROCEDURE — 73130 X-RAY EXAM OF HAND: CPT

## 2022-11-14 PROCEDURE — 6370000000 HC RX 637 (ALT 250 FOR IP): Performed by: STUDENT IN AN ORGANIZED HEALTH CARE EDUCATION/TRAINING PROGRAM

## 2022-11-14 PROCEDURE — 99283 EMERGENCY DEPT VISIT LOW MDM: CPT

## 2022-11-14 RX ORDER — DOXYCYCLINE HYCLATE 100 MG
100 TABLET ORAL 2 TIMES DAILY
Qty: 14 TABLET | Refills: 0 | Status: SHIPPED | OUTPATIENT
Start: 2022-11-14 | End: 2022-11-21

## 2022-11-14 RX ORDER — DOXYCYCLINE 100 MG/1
100 CAPSULE ORAL ONCE
Status: COMPLETED | OUTPATIENT
Start: 2022-11-14 | End: 2022-11-14

## 2022-11-14 RX ORDER — HYDROCODONE BITARTRATE AND ACETAMINOPHEN 5; 325 MG/1; MG/1
1 TABLET ORAL ONCE
Status: COMPLETED | OUTPATIENT
Start: 2022-11-14 | End: 2022-11-14

## 2022-11-14 RX ADMIN — DOXYCYCLINE 100 MG: 100 CAPSULE ORAL at 17:16

## 2022-11-14 RX ADMIN — HYDROCODONE BITARTRATE AND ACETAMINOPHEN 1 TABLET: 5; 325 TABLET ORAL at 17:16

## 2022-11-14 ASSESSMENT — LIFESTYLE VARIABLES
HOW MANY STANDARD DRINKS CONTAINING ALCOHOL DO YOU HAVE ON A TYPICAL DAY: PATIENT DOES NOT DRINK
HOW OFTEN DO YOU HAVE A DRINK CONTAINING ALCOHOL: NEVER

## 2022-11-14 ASSESSMENT — PAIN DESCRIPTION - LOCATION: LOCATION: HAND

## 2022-11-14 ASSESSMENT — PAIN DESCRIPTION - ORIENTATION: ORIENTATION: RIGHT

## 2022-11-14 ASSESSMENT — PAIN SCALES - GENERAL: PAINLEVEL_OUTOF10: 5

## 2022-11-14 ASSESSMENT — PAIN DESCRIPTION - DESCRIPTORS: DESCRIPTORS: BURNING;STABBING

## 2022-11-14 NOTE — ED PROVIDER NOTES
[address]  Emergency Department Encounter  EmergencyMedicine Resident     Pt Oscar Parker  MRN: [id number]  [place] 1955  Date of evaluation: 11/14/22  PCP:  Renetta Miller 57       Chief Complaint   Patient presents with    Hand Pain    Animal Bite       HISTORY OF PRESENT ILLNESS  (Location/Symptom, Timing/Onset, Context/Setting, Quality, Duration, Modifying Factors, Severity.)      Adele Nicole is a 77 y.o. female who presents with pain to the third digit of the right hand. Patient states that on Friday she was bit by her cat, shortly after she noticed that her whole right hand was swollen. The swelling has gone down but she has significant pain to the base of the third digit on the back of her hand. Patient states that she has been squeezing out significant amounts of purulent fluid. Denies any fevers, chills, nausea, vomiting. Denies any numbness, tingling or weakness. Patient has limited range of motion in that hand at baseline. COVID history of diabetes and CHF, was sent in by her PCP due to concerns of infection. Patient has [address] at home which she takes twice daily due to her other medical issues. States that this is controlling her pain well. She last took 1 early this morning.     PAST MEDICAL / SURGICAL / SOCIAL / FAMILY HISTORY      has a past medical history of Anemia, Arthritis, Atrial fibrillation ([address] Guerrero esophagus, Bruises easily, CAD S/P percutaneous coronary angioplasty, CHF (congestive heart failure) ([facility]), Chronic back pain, KNEE PAIN AND NECK PAIN., CKD (chronic kidney disease) stage 3, GFR 30-59 ml/min ([facility]), Colon polyp, COVID-19 vaccine administered, DDD (degenerative disc disease), DDD (degenerative disc disease), Depression, Diverticulitis, Fibromyalgia, Full dentures, GERD (gastroesophageal reflux disease), H/O dilation and curettage, History of blood transfusion, History of myocardial infarction, Hx of blood clots, Hypercholesterolemia, Hypertension, IBS (irritable bowel syndrome), MRSA (methicillin resistant staph aureus) culture positive, Neuropathy, Nocturia, Obesity, Post-menopausal bleeding, Short of breath on exertion, Sick sinus syndrome ([address] Spinal stenosis, Thyroid nodule, Tobacco abuse, Type 2 diabetes mellitus without complication, without long-term current use of insulin ([address] and Wears glasses. has a past surgical history that includes Tubal ligation (); Breast biopsy (); Coronary angioplasty with stent (2009); Tonsillectomy (age 22); hysteroscopy (13); Colonoscopy (); hysteroscopy (2017); Dilation and curettage of uterus (N/A, 2017); Total knee arthroplasty (Left); pr office/outpt visit,procedure only (N/A, 1/10/2018); Upper gastrointestinal endoscopy (2018); Colonoscopy (N/A, 2019); Total knee arthroplasty (Right, 2018); Cardiac catheterization (2016); pr sono guide needle biopsy; Dilation and curettage of uterus (N/A, 2019); pacemaker placement (06/15/2021); and Upper gastrointestinal endoscopy (N/A, 2021).       Social History     Socioeconomic History    Marital status:      Spouse name: Not on file    Number of children: Not on file    Years of education: Not on file    Highest education level: Not on file   Occupational History    Not on file   Tobacco Use    Smoking status: Former     Packs/day: 0.50     Years: 40.00     Pack years: 20.00     Types: Cigarettes     Quit date: 2014     Years since quittin.2    Smokeless tobacco: Never   Vaping Use    Vaping Use: Never used   Substance and Sexual Activity    Alcohol use: Not Currently    Drug use: No    Sexual activity: Never     Birth control/protection: Post-menopausal   Other Topics Concern    Not on file   Social History Narrative    Not on file     Social Determinants of Health     Financial Resource Strain: Not on file   Food Insecurity: Not on file   Transportation Needs: Not on file   Physical Activity: Not on file   Stress: Not on file   Social Connections: Not on file   Intimate Partner Violence: Not on file   Housing Stability: Not on file       Family History   Problem Relation Age of Onset    Heart Attack Father     Diabetes Mother     Other Mother         brain bleed    Other Daughter         blood clotting disorder    Breast Cancer Neg Hx     Cancer Neg Hx     Colon Cancer Neg Hx     Eclampsia Neg Hx     Hypertension Neg Hx     Ovarian Cancer Neg Hx      Labor Neg Hx     Spont Abortions Neg Hx     Stroke Neg Hx     Heart Disease Neg Hx        Allergies:  Adhesive tape, Pcn [penicillins], Amlodipine, Codeine, Gabapentin, Lyrica [pregabalin], Oxycodone, Sulfa antibiotics, and Other    Home Medications:  Prior to Admission medications    Medication Sig Start Date End Date Taking? Authorizing Provider   doxycycline hyclate (VIBRA-TABS) 100 MG tablet Take 1 tablet by mouth 2 times daily for 7 days 22 Yes Abi Blandon MD   linaclotide (LINZESS) 290 MCG CAPS capsule TAKE 1 CAPSULE BY MOUTH ONCE DAILY IN THE MORNING BEFORE BREAKFAST 22   Eve Willard MD   oxybutynin (DITROPAN) 5 MG tablet  22   Historical Provider, MD   spironolactone (ALDACTONE) 25 MG tablet TAKE 1 TABLET BY MOUTH ONCE DAILY 22   Historical Provider, MD   linagliptin (TRADJENTA) 5 MG tablet Take 5 mg by mouth daily 21   Historical Provider, MD   tiZANidine (ZANAFLEX) 4 MG tablet TAKE 1 TABLET BY MOUTH EVERY 8 HOURS AS NEEDED FOR MUSCLE SPASM 22   Historical Provider, MD   furosemide (LASIX) 20 MG tablet Take 40 mg daily twice a week, the other days take 20 mg daily. Patient taking differently: Take 20 mg by mouth daily take 20 mg daily.  22   Young Escobedo MD   apixaban (ELIQUIS) 5 MG TABS tablet Take 5 mg by mouth 2 times daily    Historical Provider, MD   aspirin 81 MG EC tablet Take 81 mg by mouth daily    Historical Provider, MD   cetirizine (ZYRTEC) 10 MG tablet Take 1 tablet by mouth nightly 4/18/21   Historical Provider, MD   vitamin D (CHOLECALCIFEROL) 25 MCG (1000 UT) TABS tablet Take 1,000 Units by mouth daily    Historical Provider, MD   co-enzyme Q-10 30 MG capsule Take 30 mg by mouth 3 times daily    Historical Provider, MD   dronedarone hcl (MULTAQ) 400 MG TABS Take 400 mg by mouth 2 times daily (with meals) 5/25/21   Historical Provider, MD   clotrimazole-betamethasone (LOTRISONE) 1-0.05 % cream APPLY  CREAM TOPICALLY TO AFFECTED AREA TWICE DAILY FOR 4 DAYS AND THEN ONCE DAILY FOR 3 DAYS 9/21/20   Dallin Harris, APRN - CNP   Multiple Vitamin (MULTI VITAMIN DAILY PO) Take by mouth    Historical Provider, MD   calcium citrate-vitamin D (CITRICAL + D) 315-250 MG-UNIT TABS per tablet Take 1 tablet by mouth 2 times daily (with meals)     Historical Provider, MD   French Chen strip USE 1 STRIP TO [facility] 1/16/20   Historical Provider, MD   Lancets (Tian Desean) [facility] USE 1 TO CHECK GLUCOSE 4 TIMES DAILY 1/16/20   Historical Provider, MD   fluticasone (FLONASE) 50 MCG/ACT nasal spray 1 spray by Each Nostril route daily as needed for Rhinitis     Historical Provider, MD   Probiotic Product (PROBIOTIC ADVANCED PO) Take by mouth daily    Historical Provider, MD   albuterol sulfate HFA (PROVENTIL HFA) 108 (90 Base) MCG/ACT inhaler Inhale 2 puffs into the lungs   Patient not taking: Reported on 7/7/2022 4/29/19   Historical Provider, MD   Biotin 10 MG CAPS Take by mouth    Historical Provider, MD   vitamin E 400 UNIT capsule Take 400 Units by mouth 2 times daily    Historical Provider, MD   pantoprazole (PROTONIX) 40 MG tablet Take 40 mg by mouth daily    Historical Provider, MD   Cyanocobalamin (VITAMIN B 12 PO) Take 500 mg by mouth    Historical Provider, MD   ferrous sulfate 325 (65 Fe) MG EC tablet Take 325 mg by mouth 3 times daily (with meals)    Historical Provider, MD   isosorbide mononitrate (IMDUR) 60 MG extended release tablet Take 120 mg by mouth daily 2 tablets daily 9/13/18   Historical Provider, MD   HYDROcodone-acetaminophen (NORCO) 5-325 MG per tablet Take 1 tablet by mouth every 12 hours as needed for Pain. Historical Provider, MD   DULoxetine (CYMBALTA) 30 MG extended release capsule Take 60 mg by mouth 2 times daily     Historical Provider, MD   nitroGLYCERIN (NITROSTAT) 0.4 MG SL tablet Place 0.4 mg under the tongue every 5 minutes as needed for Chest pain up to max of 3 total doses. If no relief after 1 dose, call 911. Patient not taking: Reported on 7/7/2022    Historical Provider, MD   metoprolol tartrate (LOPRESSOR) 25 MG tablet Take 25 mg by mouth 2 times daily     Historical Provider, MD   atorvastatin (LIPITOR) 80 MG tablet Take 80 mg by mouth daily. Historical Provider, MD       REVIEW OF SYSTEMS    (2-9 systems for level 4, 10 or more for level 5)      Review of Systems   Constitutional:  Negative for activity change, appetite change, chills and fever. Respiratory:  Negative for shortness of breath. Cardiovascular:  Negative for chest pain. Gastrointestinal:  Negative for nausea and vomiting. Skin:  Positive for color change and wound. Neurological:  Negative for dizziness, syncope, weakness, numbness and headaches. PHYSICAL EXAM   (up to 7 for level 4, 8 or more for level 5)      INITIAL VITALS:   BP (!) 139/46   Pulse 63   Temp 97.5 °F (36.4 °C)   Resp 16   LMP 06/29/2002 (Within Months)   SpO2 93%     Physical Exam  Constitutional:       Appearance: Normal appearance. HENT:      Head: Normocephalic and atraumatic. Right Ear: External ear normal.      Left Ear: External ear normal.   Eyes:      Extraocular Movements: Extraocular movements intact. Cardiovascular:      Rate and Rhythm: Normal rate. Pulses: Normal pulses. Pulmonary:      Effort: Pulmonary effort is normal.      Breath sounds: Normal breath sounds.    Abdominal:      Palpations: Abdomen is soft. Tenderness: There is no abdominal tenderness. Musculoskeletal:      Cervical back: Normal range of motion. Comments: Limited range of motion in digits of right hand, this is baseline for patient  Significant swelling on the back of the third digit, open wound draining purulent fluid. Erythema and warmth to the area    No obvious foreign body visualized. Neurological:      General: No focal deficit present. Mental Status: She is alert and oriented to person, place, and time. Psychiatric:         Mood and Affect: Mood normal.       DIFFERENTIAL  DIAGNOSIS     PLAN (LABS / IMAGING / EKG):  Orders Placed This Encounter   Procedures    XR HAND RIGHT (MIN 3 VIEWS)       MEDICATIONS ORDERED:  Orders Placed This Encounter   Medications    HYDROcodone-acetaminophen (NORCO) 5-325 MG per tablet 1 tablet    doxycycline monohydrate (MONODOX) capsule 100 mg     Order Specific Question:   Antimicrobial Indications     Answer:   Skin and Soft Tissue Infection    doxycycline hyclate (VIBRA-TABS) 100 MG tablet     Sig: Take 1 tablet by mouth 2 times daily for 7 days     Dispense:  14 tablet     Refill:  0       DDX: Cat bite, foreign body in wound, infection, cellulitis, flexor tenosynovitis    MDM: 77 y.o. female presents today with purulent drainage from cat bite she received last week, states that she has been able to express significant amounts of purulent fluid. Erythema and swelling has improved but drainage has not. Was told by her family doctor to come in for antibiotics. Will start patient on Doxy due to penicillin allergy. X-ray ruled out retained tooth and wound.   Pain treated with home dose of Norco.    EMERGENCY DEPARTMENT COURSE:  ED Course as of 11/15/22 0045   Mon Nov 14, 2022   1751 No foreign body seen on hand x-ray. [SS]      ED Course User Index  [SS] Cristino Sacks, MD            [place] Coma Scale  Eye Opening: Spontaneous  Best Verbal Response: Oriented  Best Motor Response: Obeys commands  Tod Coma Scale Score: 15  DIAGNOSTIC RESULTS / EMERGENCY DEPARTMENT COURSE / MDM   LAB RESULTS:  No results found for this visit on 11/14/22. RADIOLOGY:  XR HAND RIGHT (MIN 3 VIEWS)   Final Result   No acute bony abnormalities are noted              PROCEDURES:  none    CONSULTS:  None    CRITICAL CARE:  None    FINAL IMPRESSION      1.  Infected cat bite of finger, initial encounter          DISPOSITION / PLAN     DISPOSITION Decision To Discharge 11/14/2022 05:51:44 PM      PATIENT REFERRED TO:  Mukesh Faustin  [facility]  [address] [address] [address] [phone]          DISCHARGE MEDICATIONS:  Discharge Medication List as of 11/14/2022  5:53 PM        START taking these medications    Details   doxycycline hyclate (VIBRA-TABS) 100 MG tablet Take 1 tablet by mouth 2 times daily for 7 days, Disp-14 tablet, R-0Normal             Krystin Salinas MD  Emergency Medicine Resident    (Please note that portions of thisnote were completed with a voice recognition program.  Efforts were made to edit the dictations but occasionally words are mis-transcribed.)        Krystin Salinas MD  Resident  11/15/22 [id number]

## 2022-11-14 NOTE — DISCHARGE INSTRUCTIONS
Take your medication as indicated, if you are given an antibiotic then make sure you get the prescription filled and take the antibiotics until finished. Drink plenty of water while taking the antibiotics. Avoid drinking alcohol or drinks that have caffeine in it while taking antibiotics. Please follow-up with your PCP at the end of the week. Please take your normal pain medications for your pain. Please continue to express any purulent material and allow the wound to drain. For pain use ibuprofen (Motrin / Advil) or acetaminophen (Tylenol), unless prescribed medications that have acetaminophen in it. You can take over the counter acetaminophen tablets (1 - 2 tablets of the 500-mg strength every 6 hours) or ibuprofen tablets (2 tablets every 4 hours). Make sure that you follow up with animal control. If they are unable to catch the cat and you want to start prophylaxis for rabies, then notify your physician or return to the emergency department. PLEASE RETURN TO THE EMERGENCY DEPARTMENT IMMEDIATELY for worsening symptoms, redness to the area, notice any drainage or if you develop any concerning symptoms such as: high fever not relieved by acetaminophen (Tylenol) and/or ibuprofen (Motrin / Advil), redness around wound, white drainage from wound, chills, shortness of breath, chest pain, feeling of your heart fluttering or racing, persistent nausea and/or vomiting, numbness, weakness or tingling in the arms or legs or change in color of the extremities, changes in mental status, persistent headache, blurry vision, unable to follow up with your physician, or other any other care or concern.

## 2022-11-14 NOTE — ED PROVIDER NOTES
[address]  eMERGENCY dEPARTMENT eNCOUnter   Attending Attestation     Pt Name: Luis Alvarenga  MRN: [id number]  [place] 1955  Date of evaluation: 11/14/22       Luis Alvarenga is a 77 y.o. female who presents with Hand Pain and Animal Bite      History:   Patient got bit in the right middle finger on the dorsal aspect by her cat on Friday. Patient is here because she is having pain and swelling and has been getting pus out of the wound. No fevers. Exam: Vitals:   Vitals:    11/14/22 1554   BP: (!) 139/46   Pulse: 63   Resp: 16   Temp: 97.5 °F (36.4 °C)   SpO2: 93%     Patient has significant abscess and cellulitis around the wound but it is easily expressible does not affect the flexor tendon sheath at this time. I performed a history and physical examination of the patient and discussed management with the resident. I reviewed the residents note and agree with the documented findings and plan of care. Any areas of disagreement are noted on the chart. I was personally present for the key portions of any procedures. I have documented in the chart those procedures where I was not present during the key portions. I have personally reviewed all images and agree with the resident's interpretation. I have reviewed the emergency nurses triage note. I agree with the chief complaint, past medical history, past surgical history, allergies, medications, social and family history as documented unless otherwise noted below. Documentation of the HPI, Physical Exam and Medical Decision Making performed by medical students or scribes is based on my personal performance of the HPI, PE and MDM. I personally evaluated and examined the patient in conjunction with the APC and agree with the assessment, treatment plan, and disposition of the patient as recorded by the APC. Additional findings are as noted.     Negrito Potts MD  Attending Emergency  Physician             Germain Echevarria MD  11/14/22 0590

## 2022-11-15 ASSESSMENT — ENCOUNTER SYMPTOMS
VOMITING: 0
NAUSEA: 0
COLOR CHANGE: 1
SHORTNESS OF BREATH: 0

## 2023-01-05 ENCOUNTER — HOSPITAL ENCOUNTER (OUTPATIENT)
Age: 68
Discharge: HOME | End: 2023-01-05
Payer: MEDICARE

## 2023-01-05 ENCOUNTER — HOSPITAL ENCOUNTER (OUTPATIENT)
Dept: ULTRASOUND IMAGING | Age: 68
Discharge: HOME | End: 2023-01-07
Payer: MEDICARE

## 2023-01-05 DIAGNOSIS — E04.2 NONTOXIC MULTINODULAR GOITER: ICD-10-CM

## 2023-01-05 LAB
T3 FREE: 3 PG/ML (ref 2.02–4.43)
THYROXINE, FREE: 1.17 NG/DL (ref 0.93–1.7)
TSH SERPL DL<=0.05 MIU/L-ACNC: 2.37 UIU/ML (ref 0.3–5)

## 2023-01-05 PROCEDURE — 84443 ASSAY THYROID STIM HORMONE: CPT

## 2023-01-05 PROCEDURE — 84439 ASSAY OF FREE THYROXINE: CPT

## 2023-01-05 PROCEDURE — 84481 FREE ASSAY (FT-3): CPT

## 2023-01-05 PROCEDURE — 36415 COLL VENOUS BLD VENIPUNCTURE: CPT

## 2023-01-05 PROCEDURE — 76536 US EXAM OF HEAD AND NECK: CPT

## 2023-01-10 ENCOUNTER — HOSPITAL ENCOUNTER (OUTPATIENT)
Dept: HOSPITAL 95 - LAB SHORT | Age: 68
Discharge: HOME | End: 2023-01-10
Attending: DERMATOLOGY
Payer: MEDICARE

## 2023-01-10 DIAGNOSIS — D22.61: Primary | ICD-10-CM

## 2023-01-17 ENCOUNTER — TELEPHONE (OUTPATIENT)
Dept: GASTROENTEROLOGY | Age: 68
End: 2023-01-17

## 2023-01-17 NOTE — TELEPHONE ENCOUNTER
Patient states she is on Linzess and her insurance changed and the Renee Jain is to expensive. She is hoping for something that may be covered or cheaper as all her medications are very expensive. Writer let her know that she will call insurance tomorrow to see what's covered.

## 2023-01-23 ENCOUNTER — OFFICE VISIT (OUTPATIENT)
Dept: GASTROENTEROLOGY | Age: 68
End: 2023-01-23
Payer: MEDICARE

## 2023-01-23 ENCOUNTER — HOSPITAL ENCOUNTER (OUTPATIENT)
Age: 68
Discharge: HOME | End: 2023-01-23
Payer: MEDICARE

## 2023-01-23 VITALS
DIASTOLIC BLOOD PRESSURE: 79 MMHG | SYSTOLIC BLOOD PRESSURE: 151 MMHG | TEMPERATURE: 97.4 F | BODY MASS INDEX: 42.74 KG/M2 | WEIGHT: 277 LBS

## 2023-01-23 DIAGNOSIS — I12.9 BENIGN HYPERTENSION WITH CKD (CHRONIC KIDNEY DISEASE) STAGE III (HCC): ICD-10-CM

## 2023-01-23 DIAGNOSIS — K21.9 GASTROESOPHAGEAL REFLUX DISEASE, UNSPECIFIED WHETHER ESOPHAGITIS PRESENT: ICD-10-CM

## 2023-01-23 DIAGNOSIS — N18.30 BENIGN HYPERTENSION WITH CKD (CHRONIC KIDNEY DISEASE) STAGE III (HCC): ICD-10-CM

## 2023-01-23 DIAGNOSIS — R82.998 LEUKOCYTES IN URINE: ICD-10-CM

## 2023-01-23 DIAGNOSIS — K59.03 DRUG-INDUCED CONSTIPATION: ICD-10-CM

## 2023-01-23 DIAGNOSIS — Q43.8 TORTUOUS COLON: ICD-10-CM

## 2023-01-23 DIAGNOSIS — K44.9 HIATAL HERNIA: ICD-10-CM

## 2023-01-23 DIAGNOSIS — R94.39 ABNORMAL STRESS TEST: ICD-10-CM

## 2023-01-23 DIAGNOSIS — N18.32 STAGE 3B CHRONIC KIDNEY DISEASE (HCC): ICD-10-CM

## 2023-01-23 DIAGNOSIS — N18.30 SECONDARY DIABETES MELLITUS WITH STAGE 3 CHRONIC KIDNEY DISEASE (HCC): ICD-10-CM

## 2023-01-23 DIAGNOSIS — K22.2 SCHATZKI'S RING: Primary | ICD-10-CM

## 2023-01-23 DIAGNOSIS — E13.22 SECONDARY DIABETES MELLITUS WITH STAGE 3 CHRONIC KIDNEY DISEASE (HCC): ICD-10-CM

## 2023-01-23 DIAGNOSIS — E66.01 SEVERE OBESITY (BMI 35.0-39.9) WITH COMORBIDITY (HCC): ICD-10-CM

## 2023-01-23 LAB
ABSOLUTE EOS #: 0.4 K/UL (ref 0–0.4)
ABSOLUTE LYMPH #: 2 K/UL (ref 1–4.8)
ABSOLUTE MONO #: 0.8 K/UL (ref 0.1–1.3)
ALBUMIN SERPL-MCNC: 3.9 G/DL (ref 3.5–5.2)
ALP BLD-CCNC: 72 U/L (ref 35–104)
ALT SERPL-CCNC: 17 U/L (ref 5–33)
ANION GAP SERPL CALCULATED.3IONS-SCNC: 11 MMOL/L (ref 9–17)
AST SERPL-CCNC: 21 U/L
BACTERIA: ABNORMAL
BASOPHILS # BLD: 1 % (ref 0–2)
BASOPHILS ABSOLUTE: 0.1 K/UL (ref 0–0.2)
BILIRUB SERPL-MCNC: 0.4 MG/DL (ref 0.3–1.2)
BILIRUBIN URINE: NEGATIVE
BUN BLDV-MCNC: 16 MG/DL (ref 8–23)
CALCIUM SERPL-MCNC: 9.6 MG/DL (ref 8.6–10.4)
CASTS UA: ABNORMAL /LPF
CHLORIDE BLD-SCNC: 105 MMOL/L (ref 98–107)
CO2: 22 MMOL/L (ref 20–31)
COLOR: YELLOW
CREAT SERPL-MCNC: 1.81 MG/DL (ref 0.5–0.9)
EOSINOPHILS RELATIVE PERCENT: 5 % (ref 0–4)
EPITHELIAL CELLS UA: ABNORMAL /HPF
GFR SERPL CREATININE-BSD FRML MDRD: 30 ML/MIN/1.73M2
GLUCOSE BLD-MCNC: 141 MG/DL (ref 70–99)
GLUCOSE URINE: NEGATIVE
HCT VFR BLD CALC: 42 % (ref 36–46)
HEMOGLOBIN: 13.8 G/DL (ref 12–16)
KETONES, URINE: NEGATIVE
LEUKOCYTE ESTERASE, URINE: ABNORMAL
LYMPHOCYTES # BLD: 22 % (ref 24–44)
MAGNESIUM: 2.1 MG/DL (ref 1.6–2.6)
MCH RBC QN AUTO: 30.7 PG (ref 26–34)
MCHC RBC AUTO-ENTMCNC: 33 G/DL (ref 31–37)
MCV RBC AUTO: 93 FL (ref 80–100)
MONOCYTES # BLD: 8 % (ref 1–7)
NITRITE, URINE: NEGATIVE
PDW BLD-RTO: 14.9 % (ref 11.5–14.9)
PH UA: 5 (ref 5–8)
PHOSPHORUS: 2.9 MG/DL (ref 2.6–4.5)
PLATELET # BLD: 226 K/UL (ref 150–450)
PMV BLD AUTO: 8.3 FL (ref 6–12)
POTASSIUM SERPL-SCNC: 4.6 MMOL/L (ref 3.7–5.3)
PROTEIN UA: NEGATIVE
RBC # BLD: 4.51 M/UL (ref 4–5.2)
RBC UA: ABNORMAL /HPF
SEG NEUTROPHILS: 64 % (ref 36–66)
SEGMENTED NEUTROPHILS ABSOLUTE COUNT: 6 K/UL (ref 1.3–9.1)
SODIUM BLD-SCNC: 138 MMOL/L (ref 135–144)
SPECIFIC GRAVITY UA: 1.01 (ref 1–1.03)
TOTAL PROTEIN: 7.2 G/DL (ref 6.4–8.3)
TURBIDITY: CLEAR
URINE HGB: NEGATIVE
UROBILINOGEN, URINE: NORMAL
WBC # BLD: 9.3 K/UL (ref 3.5–11)
WBC UA: ABNORMAL /HPF

## 2023-01-23 PROCEDURE — 3078F DIAST BP <80 MM HG: CPT | Performed by: INTERNAL MEDICINE

## 2023-01-23 PROCEDURE — 36415 COLL VENOUS BLD VENIPUNCTURE: CPT

## 2023-01-23 PROCEDURE — 81001 URINALYSIS AUTO W/SCOPE: CPT

## 2023-01-23 PROCEDURE — 85025 COMPLETE CBC W/AUTO DIFF WBC: CPT

## 2023-01-23 PROCEDURE — 80053 COMPREHEN METABOLIC PANEL: CPT

## 2023-01-23 PROCEDURE — 99214 OFFICE O/P EST MOD 30 MIN: CPT | Performed by: INTERNAL MEDICINE

## 2023-01-23 PROCEDURE — 84100 ASSAY OF PHOSPHORUS: CPT

## 2023-01-23 PROCEDURE — 83735 ASSAY OF MAGNESIUM: CPT

## 2023-01-23 PROCEDURE — 1123F ACP DISCUSS/DSCN MKR DOCD: CPT | Performed by: INTERNAL MEDICINE

## 2023-01-23 PROCEDURE — 3077F SYST BP >= 140 MM HG: CPT | Performed by: INTERNAL MEDICINE

## 2023-01-23 RX ORDER — DOCUSATE SODIUM 100 MG/1
100 CAPSULE, LIQUID FILLED ORAL 2 TIMES DAILY
Qty: 60 CAPSULE | Refills: 0 | Status: SHIPPED | OUTPATIENT
Start: 2023-01-23 | End: 2023-02-22

## 2023-01-23 ASSESSMENT — ENCOUNTER SYMPTOMS
SORE THROAT: 0
CHOKING: 0
NAUSEA: 0
RECTAL PAIN: 0
ABDOMINAL DISTENTION: 1
CONSTIPATION: 1
VOICE CHANGE: 0
SHORTNESS OF BREATH: 0
TROUBLE SWALLOWING: 0
ANAL BLEEDING: 0
WHEEZING: 0
ABDOMINAL PAIN: 0
DIARRHEA: 0
BLOOD IN STOOL: 0
COUGH: 0
VOMITING: 0

## 2023-01-23 NOTE — PROGRESS NOTES
GI CLINIC FOLLOW UP    NTERVAL HISTORY:   No referring provider defined for this encounter. Chief Complaint   Patient presents with    Diverticulosis     Pt is here today for a 8 month f/u last seen on 4/15/22 for sig. Diverticulosis, GERD, IBS, Barretts esophagus, & hiatal hernia. 1. Schatzki's ring    2. Hiatal hernia    3. Abnormal stress test    4. Severe obesity (BMI 35.0-39. 9) with comorbidity ([address]    5. Gastroesophageal reflux disease, unspecified whether esophagitis present    6. Drug-induced constipation    7. Tortuous colon      This patient seen my office as a follow-up  She has history significant for morbid obesity  She is history significant for hiatus hernia Schatzki's ring had endoscopy done in the past  Has history for acid reflux  History for Guerrero's esophagus without dysplasia history for hiatus hernia  Colonoscopy has revealed very tortuous sigmoid colon prep was not ideal her records were reviewed with her  She is also seeing cardiologist for some cardiac issues also some shortness of breath sleep apnea etc.  Denies any overt bleeding denies any melanotic stools  No current smoking alcohol abuse illicit drug usage    She has constipation was prescribed Linzess in the past which helped her but insurance not covering Linzess anymore      HISTORY OF PRESENT ILLNESS: Deanne Rosario is a 79 y.o. female with a past history remarkable for , referred for evaluation of   Chief Complaint   Patient presents with    Diverticulosis     Pt is here today for a 8 month f/u last seen on 4/15/22 for sig. Diverticulosis, GERD, IBS, Barretts esophagus, & hiatal hernia. .    Past Medical,Family, and Social History reviewed and does contribute to the patient presenting condition. Patient's PMH/PSH,SH,PSYCH Hx, MEDs, ALLERGIES, and ROS were all reviewed and updated in the appropriate sections.     PAST MEDICAL HISTORY:  Past Medical History:   Diagnosis Date    Anemia     Arthritis back    Atrial fibrillation ([address]     on Eliquis for    Guerrero esophagus 11/01/2018    Bruises easily     CAD S/P percutaneous coronary angioplasty 06/08/2017    cardiac stent x1 5-29-09    CHF (congestive heart failure) ([address] 08/23/2021    goes to [facility] Congestive heart clinic. Chronic back pain, KNEE PAIN AND NECK PAIN. CKD (chronic kidney disease) stage 3, GFR 30-59 ml/min ([facility])     Colon polyp 04/16/2019    hyperplastic polyp    COVID-19 vaccine administered 12-,4-,328-2021    Pfizer    DDD (degenerative disc disease)     DDD (degenerative disc disease)     Depression     Diverticulitis     Fibromyalgia     Full dentures     GERD (gastroesophageal reflux disease)     H/O dilation and curettage 07/17/2017    History of blood transfusion     no problems    History of myocardial infarction 05/2009    Hx of blood clots     left kidney    Hypercholesterolemia     Hypertension     IBS (irritable bowel syndrome)     MRSA (methicillin resistant staph aureus) culture positive     2018 in [facility]    Neuropathy     Nocturia     Obesity     Post-menopausal bleeding     Short of breath on exertion     Sick sinus syndrome ([address]     Pacemaker placed 6-    Spinal stenosis     Thyroid nodule     Tobacco abuse     'quit 7 yrs ago. \" Quit August 2014\"    Type 2 diabetes mellitus without complication, without long-term current use of insulin ([address] 06/08/2017    Wears glasses        Past Surgical History:   Procedure Laterality Date    BREAST BIOPSY  2005    Left---Benign    CARDIAC CATHETERIZATION  06/20/2016    no intervention    CHG US GUIDANCE NEEDLE PLACEMENT IMG S&I      COLONOSCOPY  2011    diverticulitis    COLONOSCOPY N/A 4/16/2019    hyperplastic polyp    CORONARY ANGIOPLASTY WITH STENT PLACEMENT  05/2009    X 1    DILATION AND CURETTAGE OF UTERUS N/A 7/17/2017    DILATATION AND CURETTAGE OPERATIVE HYSTEROSCOPY WITH MYOSURE, POLYPECTOMY, MYOMECTOMY performed by Mariluz Roper DO at [address] AND CURETTAGE OF UTERUS N/A 7/25/2019    DILATATION AND CURETTAGE HYSTEROSCOPY performed by Lisette Wesley DO at [address] [place]  12/16/13    Operative Hscope with endometrial sampling, polypectomy & myomectomy [facility]    HYSTEROSCOPY  07/17/2017    PACEMAKER PLACEMENT  06/15/2021    Sick sinus syndrome    ID OFFICE/OUTPT VISIT,PROCEDURE ONLY N/A 1/10/2018    DILATATION AND CURETTAGE HYSTEROSCOPY performed by Lisette Wesley DO at [facility]  age 22    TOTAL KNEE ARTHROPLASTY Left     TOTAL KNEE ARTHROPLASTY Right 11/20/2018    TUBAL LIGATION  1979    UPPER GASTROINTESTINAL ENDOSCOPY  11/01/2018    [facility]    UPPER GASTROINTESTINAL ENDOSCOPY N/A 12/23/2021    EGD BIOPSY performed by Adi Rush MD at [address]:    Current Outpatient Medications:     docusate sodium (COLACE) 100 MG capsule, Take 1 capsule by mouth 2 times daily, Disp: 60 capsule, Rfl: 0    linaclotide (LINZESS) 290 MCG CAPS capsule, TAKE 1 CAPSULE BY MOUTH ONCE DAILY IN THE MORNING BEFORE BREAKFAST, Disp: 30 capsule, Rfl: 5    oxybutynin (DITROPAN) 5 MG tablet, , Disp: , Rfl:     spironolactone (ALDACTONE) 25 MG tablet, TAKE 1 TABLET BY MOUTH ONCE DAILY, Disp: , Rfl:     linagliptin (TRADJENTA) 5 MG tablet, Take 5 mg by mouth daily, Disp: , Rfl:     tiZANidine (ZANAFLEX) 4 MG tablet, TAKE 1 TABLET BY MOUTH EVERY 8 HOURS AS NEEDED FOR MUSCLE SPASM, Disp: , Rfl:     furosemide (LASIX) 20 MG tablet, Take 40 mg daily twice a week, the other days take 20 mg daily.  (Patient taking differently: Take 20 mg by mouth daily take 20 mg daily.), Disp: 45 tablet, Rfl: 6    apixaban (ELIQUIS) 5 MG TABS tablet, Take 5 mg by mouth 2 times daily, Disp: , Rfl:     aspirin 81 MG EC tablet, Take 81 mg by mouth daily, Disp: , Rfl:     cetirizine (ZYRTEC) 10 MG tablet, Take 1 tablet by mouth nightly, Disp: , Rfl:     vitamin D (CHOLECALCIFEROL) 25 MCG (1000 UT) TABS tablet, Take 1,000 Units by mouth daily, Disp: , Rfl:     co-enzyme Q-10 30 MG capsule, Take 30 mg by mouth 3 times daily, Disp: , Rfl:     dronedarone hcl (MULTAQ) 400 MG TABS, Take 400 mg by mouth 2 times daily (with meals), Disp: , Rfl:     clotrimazole-betamethasone (LOTRISONE) 1-0.05 % cream, APPLY  CREAM TOPICALLY TO AFFECTED AREA TWICE DAILY FOR 4 DAYS AND THEN ONCE DAILY FOR 3 DAYS, Disp: 15 g, Rfl: 0    Multiple Vitamin (MULTI VITAMIN DAILY PO), Take by mouth, Disp: , Rfl:     calcium citrate-vitamin D (CITRICAL + D) 315-250 MG-UNIT TABS per tablet, Take 1 tablet by mouth 2 times daily (with meals) , Disp: , Rfl:     ONETOUCH VERIO strip, USE 1 STRIP TO CHECK GLUCOSE TWICE DAILY, Disp: , Rfl:     Lancets (ONETOUCH DELICA PLUS [id number]) MISC, USE 1 TO CHECK GLUCOSE 4 TIMES DAILY, Disp: , Rfl:     fluticasone (FLONASE) 50 MCG/ACT nasal spray, 1 spray by Each Nostril route daily as needed for Rhinitis , Disp: , Rfl:     Probiotic Product (PROBIOTIC ADVANCED PO), Take by mouth daily, Disp: , Rfl:     Biotin 10 MG CAPS, Take by mouth, Disp: , Rfl:     vitamin E 400 UNIT capsule, Take 400 Units by mouth 2 times daily, Disp: , Rfl:     pantoprazole (PROTONIX) 40 MG tablet, Take 40 mg by mouth daily, Disp: , Rfl:     Cyanocobalamin (VITAMIN B 12 PO), Take 500 mg by mouth, Disp: , Rfl:     ferrous sulfate 325 (65 Fe) MG EC tablet, Take 325 mg by mouth 3 times daily (with meals), Disp: , Rfl:     isosorbide mononitrate (IMDUR) 60 MG extended release tablet, Take 120 mg by mouth daily 2 tablets daily, Disp: , Rfl:     HYDROcodone-acetaminophen (NORCO) 5-325 MG per tablet, Take 1 tablet by mouth every 12 hours as needed for Pain., Disp: , Rfl:     DULoxetine (CYMBALTA) 30 MG extended release capsule, Take 60 mg by mouth 2 times daily , Disp: , Rfl:     metoprolol tartrate (LOPRESSOR) 25 MG tablet, Take 25 mg by mouth 2 times daily , Disp: , Rfl:     atorvastatin (LIPITOR) 80 MG tablet, Take 80 mg by mouth daily.   , Disp: , Rfl: albuterol sulfate HFA (PROVENTIL;VENTOLIN;PROAIR) 108 (90 Base) MCG/ACT inhaler, Inhale 2 puffs into the lungs  (Patient not taking: No sig reported), Disp: , Rfl:     nitroGLYCERIN (NITROSTAT) 0.4 MG SL tablet, Place 0.4 mg under the tongue every 5 minutes as needed for Chest pain up to max of 3 total doses. If no relief after 1 dose, call 911.  (Patient not taking: No sig reported), Disp: , Rfl:     ALLERGIES:   Allergies   Allergen Reactions    Adhesive Tape Rash    Pcn [Penicillins] Hives    Amlodipine      Leg swelling    Codeine Other (See Comments)     Stomach cramps    Gabapentin Other (See Comments)     Causes hands shake and unable to hold anything    Lyrica [Pregabalin] Other (See Comments)     \"trouble concentrating\"    Oxycodone Hives    Sulfa Antibiotics Other (See Comments)     Cramps    Other Rash     white       FAMILY HISTORY:       Problem Relation Age of Onset    Heart Attack Father     Diabetes Mother     Other Mother         brain bleed    Other Daughter         blood clotting disorder    Breast Cancer Neg Hx     Cancer Neg Hx     Colon Cancer Neg Hx     Eclampsia Neg Hx     Hypertension Neg Hx     Ovarian Cancer Neg Hx      Labor Neg Hx     Spont Abortions Neg Hx     Stroke Neg Hx     Heart Disease Neg Hx          SOCIAL HISTORY:   Social History     Socioeconomic History    Marital status:      Spouse name: Not on file    Number of children: Not on file    Years of education: Not on file    Highest education level: Not on file   Occupational History    Not on file   Tobacco Use    Smoking status: Former     Packs/day: 0.50     Years: 40.00     Pack years: 20.00     Types: Cigarettes     Quit date: 2014     Years since quittin.4    Smokeless tobacco: Never   Vaping Use    Vaping Use: Never used   Substance and Sexual Activity    Alcohol use: Not Currently    Drug use: No    Sexual activity: Never     Birth control/protection: Post-menopausal   Other Topics Concern    Not on file   Social History Narrative    Not on file     Social Determinants of Health     Financial Resource Strain: Not on file   Food Insecurity: Not on file   Transportation Needs: Not on file   Physical Activity: Not on file   Stress: Not on file   Social Connections: Not on file   Intimate Partner Violence: Not on file   Housing Stability: Not on file         REVIEW OF SYSTEMS:         Review of Systems   Constitutional:  Positive for appetite change. Negative for fatigue and unexpected weight change. HENT:  Negative for sore throat, trouble swallowing and voice change. Respiratory:  Negative for cough, choking, shortness of breath and wheezing. Cardiovascular:  Negative for chest pain, palpitations and leg swelling. Gastrointestinal:  Positive for abdominal distention and constipation. Negative for abdominal pain, anal bleeding, blood in stool, diarrhea, nausea, rectal pain and vomiting. Neurological:  Negative for dizziness, weakness, light-headedness, numbness and headaches. Hematological:  Does not bruise/bleed easily. Psychiatric/Behavioral:  Negative for confusion and sleep disturbance. The patient is not nervous/anxious. PHYSICAL EXAMINATION: Vital signs reviewed per the nursing documentation. BP (!) 151/79   Temp 97.4 °F (36.3 °C)   Wt 277 lb (125.6 kg)   LMP 06/29/2002 (Within Months)   BMI 42.74 kg/m²   Body mass index is 42.74 kg/m². Physical Exam  Nursing note reviewed. Constitutional:       Appearance: She is well-developed. Comments: Anxious  Obesity    HENT:      Head: Normocephalic and atraumatic. Eyes:      Conjunctiva/sclera: Conjunctivae normal.      Pupils: Pupils are equal, round, and reactive to light. Cardiovascular:      Heart sounds: Normal heart sounds. Pulmonary:      Effort: Pulmonary effort is normal.      Breath sounds: Normal breath sounds. Abdominal:      General: Bowel sounds are normal.      Palpations: Abdomen is soft.       Comments: NON TENDER, NON DISTENTED  LIVER SPLEEN AND HERNIAS ARE NOT  PALPABLE  BOWEL SOUNDS ARE POSITIVE     Obese belly    Musculoskeletal:         General: Normal range of motion. Cervical back: Normal range of motion and neck supple. Right lower leg: Edema present. Left lower leg: Edema present. Skin:     General: Skin is warm. Neurological:      Mental Status: She is alert and oriented to person, place, and time. Psychiatric:         Behavior: Behavior normal.         LABORATORY DATA: Reviewed  Lab Results   Component Value Date    WBC 9.3 01/23/2023    HGB 13.8 01/23/2023    HCT 42.0 01/23/2023    MCV 93.0 01/23/2023     01/23/2023     01/23/2023    K 4.6 01/23/2023     01/23/2023    CO2 22 01/23/2023    BUN 16 01/23/2023    CREATININE 1.81 (H) 01/23/2023    LABALBU 3.9 01/23/2023    BILITOT 0.4 01/23/2023    ALKPHOS 72 01/23/2023    AST 21 01/23/2023    ALT 17 01/23/2023    INR 0.9 07/18/2019         Lab Results   Component Value Date    RBC 4.51 01/23/2023    HGB 13.8 01/23/2023    MCV 93.0 01/23/2023    MCH 30.7 01/23/2023    MCHC 33.0 01/23/2023    RDW 14.9 01/23/2023    MPV 8.3 01/23/2023    BASOPCT 1 01/23/2023    LYMPHSABS 2.00 01/23/2023    MONOSABS 0.80 01/23/2023    NEUTROABS 6.00 01/23/2023    EOSABS 0.40 01/23/2023    BASOSABS 0.10 01/23/2023         DIAGNOSTIC TESTING:     US HEAD NECK SOFT TISSUE THYROID    Result Date: 1/6/2023  EXAMINATION: THYROID ULTRASOUND 1/5/2023 COMPARISON: None. HISTORY: ORDERING SYSTEM PROVIDED HISTORY: Nontoxic multinodular goiter FINDINGS: Right thyroid lobe:  40 x 20 x 17 mm Left thyroid lobe:  43 x 12 x 18 mm Isthmus:  4 mm Thyroid Gland:  Thyroid is heterogeneous with symmetric unremarkable vascularity. Nodules: NODULE: Size: 1 mm Location: 10 x 8 x 6 1. Composition:  Mixed cystic and solid (1) 2. Echogenicity:  Hypoechoic (2) 3. Shape: Wider-than-tall (0) 4.  Margins:  Smooth (0) 5. Echogenic foci:  None (0) ACR TI-RADS total points: 3 ACR TI-RADS risk category: TR3 Cervical lymphadenopathy: No abnormal lymph nodes in the imaged portions of the neck. Heterogeneous thyroid with 10 mm TI-RADS 3 nodule mid right thyroid that does not meet criteria for follow-up. Assessment  1. Schatzki's ring    2. Hiatal hernia    3. Abnormal stress test    4. Severe obesity (BMI 35.0-39. 9) with comorbidity ([address]    5. Gastroesophageal reflux disease, unspecified whether esophagitis present    6. Drug-induced constipation    7. Tortuous colon        Plan    Start Colace    Pt was given instructions and advice in detail about the symptom of constipation. She was explained about avoidance of fast food, soda pops, cheese and red meat. Was also told to avoid sedatives narcotics and pain killers if possible. Pt was advised to start drinking ample amount of water and liquid. Was told to adapt and follow an exercise regimen. Instructions were given to increase the amount of fiber including dietary in terms of bran, cereals, whole wheat, brown bread etc. Was also instructed to start using supplemental fiber either Metamucil, citrucell or bennafiber with ample liquids. She was told to start drinking prune juice which is good for constipation. If symptoms don't resolve she will require medicines to assist with her symptoms    Pt has verbalized understanding and agreement to this plan. Pt seems to have signs and symptoms consistent with GERD, acid indigestion and heartburns. She was discussed  in detail about some possible life style and dietary modifications. She was stressed about the maintenance  of appropriate weight and effect of obesity contributing to reflux symptoms. Routine exercise was streesed. Avoidance of Caffeine, nicotine and chocolate were explained. Pt was asked to avoid spices grease and fried food. Advices were also given about avoidance of any kind of fast foods, soda pops and high energy drinks.     Pt was advised to place two small block under the head end of the bed which may help with night time reflux. Was advised not to eat any thin at least 2-3 hrs before going to bed and walk especially after dinner    Pt has verbalized understanding and agreement to this plan. Pt was discussed in detail about the possible side effects of proton pump inhibiter therapy. She was explained about the possibility of calcium and magnesium malabsorption and was advised to start taking calcium supplements with Vit D. Some over the counter regimens were explained to patient. Some dietary advices were also given. She has verbalized understanding and agreement to this. Pt was advised in detail about some life style and dietary modifications. She was advised about avoidance of caffeine, nicotine and chocolate. Pt was also told to stay away from any kind of fast foods, soda pops. She was also advised to avoid lots of spices, grease and fried food etc.     Instructions were also given about trying to arrange the timing, quality and quantity of food. Instructions were given about using ample amount of fiber including dietary and supplemental fiber either metamucil, bennafiber or citrucell etc.  Pt was advised about drinking ample amount of water without any colors or chemicals. Stress was given about regular exercise. Pt has verbalized understanding and agreement to these modifications.     The patient was instructed to start taking some OTC Probiotics products   These are available over the counter at the Pharmacy stores and Grocery stores  He was explained about the beneficial effects they have in the GI track  They will help to establish the good bacterial rosy and will help with the digestion and bowel movements  The patient has verbalized understanding and agreement to this plan    More than half of patient's clinic visit time was spent in counseling about lifestyle and dietary modifications  Patient's  questions were answered in this regard as well  The patient has verbalized understanding and agreement       Thank you for allowing me to participate in the care of Ms. Xu Al. For any further questions please do not hesitate to contact me. I have reviewed and agree with the ROS entered by the MA/Nurse.          Silvana Hilario MD, [facility]  Board Certified in Gastroenterology and [address] [place] [address] Gastroenterology  Office #: [phone]

## 2023-02-01 PROBLEM — L03.011 CELLULITIS OF FINGER OF RIGHT HAND: Status: ACTIVE | Noted: 2022-11-17

## 2023-02-01 PROBLEM — S61.259A INFECTED CAT BITE OF FINGER: Status: ACTIVE | Noted: 2022-12-05

## 2023-02-01 PROBLEM — L08.9 INFECTED CAT BITE OF FINGER: Status: ACTIVE | Noted: 2022-12-05

## 2023-02-01 PROBLEM — I48.0 PAROXYSMAL A-FIB (HCC): Status: ACTIVE | Noted: 2022-10-31
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
## 2023-02-10 ENCOUNTER — HOSPITAL ENCOUNTER (OUTPATIENT)
Age: 68
Discharge: HOME | End: 2023-02-10
Payer: MEDICARE

## 2023-02-10 DIAGNOSIS — E13.22 SECONDARY DIABETES MELLITUS WITH STAGE 3 CHRONIC KIDNEY DISEASE (HCC): ICD-10-CM

## 2023-02-10 DIAGNOSIS — N18.30 BENIGN HYPERTENSION WITH CKD (CHRONIC KIDNEY DISEASE) STAGE III (HCC): ICD-10-CM

## 2023-02-10 DIAGNOSIS — R82.998 LEUKOCYTES IN URINE: ICD-10-CM

## 2023-02-10 DIAGNOSIS — I12.9 BENIGN HYPERTENSION WITH CKD (CHRONIC KIDNEY DISEASE) STAGE III (HCC): ICD-10-CM

## 2023-02-10 DIAGNOSIS — N18.30 SECONDARY DIABETES MELLITUS WITH STAGE 3 CHRONIC KIDNEY DISEASE (HCC): ICD-10-CM

## 2023-02-10 DIAGNOSIS — N18.32 STAGE 3B CHRONIC KIDNEY DISEASE (HCC): ICD-10-CM

## 2023-02-10 PROCEDURE — 87186 SC STD MICRODIL/AGAR DIL: CPT

## 2023-02-10 PROCEDURE — 87077 CULTURE AEROBIC IDENTIFY: CPT

## 2023-02-10 PROCEDURE — 87086 URINE CULTURE/COLONY COUNT: CPT

## 2023-02-12 LAB
MICROORGANISM SPEC CULT: ABNORMAL
SPECIMEN DESCRIPTION: ABNORMAL

## 2023-02-13 ENCOUNTER — HOSPITAL ENCOUNTER (OUTPATIENT)
Dept: ULTRASOUND IMAGING | Age: 68
Discharge: HOME | End: 2023-02-15
Payer: MEDICARE

## 2023-02-13 DIAGNOSIS — E13.22 SECONDARY DIABETES MELLITUS WITH STAGE 3 CHRONIC KIDNEY DISEASE (HCC): ICD-10-CM

## 2023-02-13 DIAGNOSIS — R82.998 LEUKOCYTES IN URINE: ICD-10-CM

## 2023-02-13 DIAGNOSIS — N18.32 STAGE 3B CHRONIC KIDNEY DISEASE (HCC): ICD-10-CM

## 2023-02-13 DIAGNOSIS — N18.30 SECONDARY DIABETES MELLITUS WITH STAGE 3 CHRONIC KIDNEY DISEASE (HCC): ICD-10-CM

## 2023-02-13 DIAGNOSIS — N18.30 BENIGN HYPERTENSION WITH CKD (CHRONIC KIDNEY DISEASE) STAGE III (HCC): ICD-10-CM

## 2023-02-13 DIAGNOSIS — I12.9 BENIGN HYPERTENSION WITH CKD (CHRONIC KIDNEY DISEASE) STAGE III (HCC): ICD-10-CM

## 2023-02-13 PROCEDURE — 76775 US EXAM ABDO BACK WALL LIM: CPT | Performed by: INTERNAL MEDICINE

## 2023-06-14 ENCOUNTER — HOSPITAL ENCOUNTER (OUTPATIENT)
Dept: HOSPITAL 95 - LAB SHORT | Age: 68
Discharge: HOME | End: 2023-06-14
Attending: DERMATOLOGY
Payer: MEDICARE

## 2023-06-14 DIAGNOSIS — D48.5: ICD-10-CM

## 2023-06-14 DIAGNOSIS — L82.0: Primary | ICD-10-CM

## 2023-07-13 ENCOUNTER — HOSPITAL ENCOUNTER (OUTPATIENT)
Dept: HOSPITAL 95 - PLD | Age: 68
Discharge: HOME | End: 2023-07-13
Attending: DERMATOLOGY
Payer: MEDICARE

## 2023-07-13 DIAGNOSIS — L72.8: Primary | ICD-10-CM

## 2023-08-31 ENCOUNTER — HOSPITAL ENCOUNTER (OUTPATIENT)
Age: 68
Discharge: HOME | End: 2023-08-31
Payer: MEDICARE

## 2023-08-31 DIAGNOSIS — N18.32 STAGE 3B CHRONIC KIDNEY DISEASE (HCC): ICD-10-CM

## 2023-08-31 DIAGNOSIS — E13.22 SECONDARY DIABETES MELLITUS WITH STAGE 3 CHRONIC KIDNEY DISEASE (HCC): ICD-10-CM

## 2023-08-31 DIAGNOSIS — E83.39 HYPOPHOSPHATEMIA: ICD-10-CM

## 2023-08-31 DIAGNOSIS — N18.30 SECONDARY DIABETES MELLITUS WITH STAGE 3 CHRONIC KIDNEY DISEASE (HCC): ICD-10-CM

## 2023-08-31 DIAGNOSIS — I12.9 BENIGN HYPERTENSION WITH CKD (CHRONIC KIDNEY DISEASE) STAGE III (HCC): ICD-10-CM

## 2023-08-31 DIAGNOSIS — N18.30 BENIGN HYPERTENSION WITH CKD (CHRONIC KIDNEY DISEASE) STAGE III (HCC): ICD-10-CM

## 2023-08-31 LAB
ALBUMIN SERPL-MCNC: 4 G/DL (ref 3.5–5.2)
ALP SERPL-CCNC: 90 U/L (ref 35–104)
ALT SERPL-CCNC: 20 U/L (ref 5–33)
ANION GAP SERPL CALCULATED.3IONS-SCNC: 12 MMOL/L (ref 9–17)
AST SERPL-CCNC: 24 U/L
BACTERIA URNS QL MICRO: ABNORMAL
BASOPHILS # BLD: 0.1 K/UL (ref 0–0.2)
BASOPHILS NFR BLD: 1 % (ref 0–2)
BILIRUB SERPL-MCNC: 0.7 MG/DL (ref 0.3–1.2)
BILIRUB UR QL STRIP: NEGATIVE
BUN SERPL-MCNC: 30 MG/DL (ref 8–23)
CALCIUM SERPL-MCNC: 9.9 MG/DL (ref 8.6–10.4)
CASTS #/AREA URNS LPF: ABNORMAL /LPF
CHLORIDE SERPL-SCNC: 100 MMOL/L (ref 98–107)
CLARITY UR: ABNORMAL
CO2 SERPL-SCNC: 24 MMOL/L (ref 20–31)
COLOR UR: YELLOW
CREAT SERPL-MCNC: 2.4 MG/DL (ref 0.5–0.9)
CREAT UR-MCNC: 111.7 MG/DL (ref 28–217)
EOSINOPHIL # BLD: 0.2 K/UL (ref 0–0.4)
EOSINOPHILS RELATIVE PERCENT: 3 % (ref 0–4)
EPI CELLS #/AREA URNS HPF: ABNORMAL /HPF
ERYTHROCYTE [DISTWIDTH] IN BLOOD BY AUTOMATED COUNT: 15.2 % (ref 11.5–14.9)
GFR SERPL CREATININE-BSD FRML MDRD: 22 ML/MIN/1.73M2
GLUCOSE SERPL-MCNC: 73 MG/DL (ref 70–99)
GLUCOSE UR STRIP-MCNC: NEGATIVE MG/DL
HCT VFR BLD AUTO: 38.8 % (ref 36–46)
HGB BLD-MCNC: 12.8 G/DL (ref 12–16)
HGB UR QL STRIP.AUTO: NEGATIVE
KETONES UR STRIP-MCNC: NEGATIVE MG/DL
LEUKOCYTE ESTERASE UR QL STRIP: ABNORMAL
LYMPHOCYTES NFR BLD: 1.7 K/UL (ref 1–4.8)
LYMPHOCYTES RELATIVE PERCENT: 20 % (ref 24–44)
MAGNESIUM SERPL-MCNC: 2.3 MG/DL (ref 1.6–2.6)
MCH RBC QN AUTO: 31.8 PG (ref 26–34)
MCHC RBC AUTO-ENTMCNC: 32.9 G/DL (ref 31–37)
MCV RBC AUTO: 96.6 FL (ref 80–100)
MICROALBUMIN UR-MCNC: <12 MG/L
MICROALBUMIN/CREAT UR-RTO: NORMAL MCG/MG CREAT
MONOCYTES NFR BLD: 0.9 K/UL (ref 0.1–1.3)
MONOCYTES NFR BLD: 11 % (ref 1–7)
NEUTROPHILS NFR BLD: 65 % (ref 36–66)
NEUTS SEG NFR BLD: 5.5 K/UL (ref 1.3–9.1)
NITRITE UR QL STRIP: POSITIVE
PH UR STRIP: 5.5 [PH] (ref 5–8)
PHOSPHATE SERPL-MCNC: 4.5 MG/DL (ref 2.6–4.5)
PLATELET # BLD AUTO: 266 K/UL (ref 150–450)
PMV BLD AUTO: 7.6 FL (ref 6–12)
POTASSIUM SERPL-SCNC: 4.2 MMOL/L (ref 3.7–5.3)
PROT SERPL-MCNC: 7.6 G/DL (ref 6.4–8.3)
PROT UR STRIP-MCNC: NEGATIVE MG/DL
RBC # BLD AUTO: 4.02 M/UL (ref 4–5.2)
RBC #/AREA URNS HPF: ABNORMAL /HPF
SODIUM SERPL-SCNC: 136 MMOL/L (ref 135–144)
SP GR UR STRIP: 1.02 (ref 1–1.03)
UROBILINOGEN UR STRIP-ACNC: NORMAL EU/DL (ref 0–1)
WBC #/AREA URNS HPF: ABNORMAL /HPF
WBC OTHER # BLD: 8.4 K/UL (ref 3.5–11)

## 2023-08-31 PROCEDURE — 82570 ASSAY OF URINE CREATININE: CPT

## 2023-08-31 PROCEDURE — 36415 COLL VENOUS BLD VENIPUNCTURE: CPT

## 2023-08-31 PROCEDURE — 83735 ASSAY OF MAGNESIUM: CPT

## 2023-08-31 PROCEDURE — 85025 COMPLETE CBC W/AUTO DIFF WBC: CPT

## 2023-08-31 PROCEDURE — 82043 UR ALBUMIN QUANTITATIVE: CPT

## 2023-08-31 PROCEDURE — 80053 COMPREHEN METABOLIC PANEL: CPT

## 2023-08-31 PROCEDURE — 84100 ASSAY OF PHOSPHORUS: CPT

## 2023-08-31 PROCEDURE — 81001 URINALYSIS AUTO W/SCOPE: CPT

## 2023-09-01 PROBLEM — Q26.3 PARTIAL ANOMALOUS PULMONARY VENOUS RETURN (PAPVR): Status: ACTIVE | Noted: 2023-08-18

## 2023-09-01 PROBLEM — M12.811 ROTATOR CUFF ARTHROPATHY OF RIGHT SHOULDER: Status: ACTIVE | Noted: 2023-06-21

## 2023-09-26 ENCOUNTER — OFFICE VISIT (OUTPATIENT)
Dept: GASTROENTEROLOGY | Age: 68
End: 2023-09-26
Payer: MEDICARE

## 2023-09-26 VITALS
WEIGHT: 257 LBS | SYSTOLIC BLOOD PRESSURE: 157 MMHG | TEMPERATURE: 97.1 F | BODY MASS INDEX: 40.25 KG/M2 | DIASTOLIC BLOOD PRESSURE: 88 MMHG

## 2023-09-26 DIAGNOSIS — E66.01 SEVERE OBESITY (BMI 35.0-39.9) WITH COMORBIDITY (HCC): ICD-10-CM

## 2023-09-26 DIAGNOSIS — Q43.8 REDUNDANT COLON: ICD-10-CM

## 2023-09-26 DIAGNOSIS — K58.1 IRRITABLE BOWEL SYNDROME WITH CONSTIPATION: ICD-10-CM

## 2023-09-26 DIAGNOSIS — K44.9 HIATAL HERNIA: ICD-10-CM

## 2023-09-26 DIAGNOSIS — Z95.0 CARDIAC PACEMAKER IN SITU: ICD-10-CM

## 2023-09-26 DIAGNOSIS — K57.30 SIGMOID DIVERTICULOSIS: ICD-10-CM

## 2023-09-26 DIAGNOSIS — K22.2 SCHATZKI'S RING: Primary | ICD-10-CM

## 2023-09-26 DIAGNOSIS — F11.90 CHRONIC NARCOTIC USE: ICD-10-CM

## 2023-09-26 DIAGNOSIS — K21.9 GASTROESOPHAGEAL REFLUX DISEASE, UNSPECIFIED WHETHER ESOPHAGITIS PRESENT: ICD-10-CM

## 2023-09-26 PROCEDURE — 99214 OFFICE O/P EST MOD 30 MIN: CPT | Performed by: INTERNAL MEDICINE

## 2023-09-26 PROCEDURE — 3079F DIAST BP 80-89 MM HG: CPT | Performed by: INTERNAL MEDICINE

## 2023-09-26 PROCEDURE — 3077F SYST BP >= 140 MM HG: CPT | Performed by: INTERNAL MEDICINE

## 2023-09-26 PROCEDURE — 1123F ACP DISCUSS/DSCN MKR DOCD: CPT | Performed by: INTERNAL MEDICINE

## 2023-09-26 ASSESSMENT — ENCOUNTER SYMPTOMS
COUGH: 0
SORE THROAT: 0
ANAL BLEEDING: 0
SHORTNESS OF BREATH: 0
ABDOMINAL DISTENTION: 1
NAUSEA: 0
RECTAL PAIN: 0
CONSTIPATION: 1
ABDOMINAL PAIN: 0
DIARRHEA: 0
VOMITING: 0
TROUBLE SWALLOWING: 0
VOICE CHANGE: 0
BLOOD IN STOOL: 0
CHOKING: 0
WHEEZING: 0

## 2023-10-06 ENCOUNTER — HOSPITAL ENCOUNTER (OUTPATIENT)
Age: 68
Discharge: HOME | End: 2023-10-06
Payer: MEDICARE

## 2023-10-06 DIAGNOSIS — N18.4 SECONDARY DIABETES MELLITUS WITH STAGE 4 CHRONIC KIDNEY DISEASE (HCC): ICD-10-CM

## 2023-10-06 DIAGNOSIS — E13.22 SECONDARY DIABETES MELLITUS WITH STAGE 4 CHRONIC KIDNEY DISEASE (HCC): ICD-10-CM

## 2023-10-06 DIAGNOSIS — I12.9 BENIGN HYPERTENSION WITH CKD (CHRONIC KIDNEY DISEASE) STAGE IV (HCC): ICD-10-CM

## 2023-10-06 DIAGNOSIS — N18.4 CKD (CHRONIC KIDNEY DISEASE), STAGE IV (HCC): ICD-10-CM

## 2023-10-06 DIAGNOSIS — N18.4 BENIGN HYPERTENSION WITH CKD (CHRONIC KIDNEY DISEASE) STAGE IV (HCC): ICD-10-CM

## 2023-10-06 DIAGNOSIS — R82.998 LEUKOCYTES IN URINE: ICD-10-CM

## 2023-10-06 LAB
ANION GAP SERPL CALCULATED.3IONS-SCNC: 11 MMOL/L (ref 9–17)
BUN SERPL-MCNC: 14 MG/DL (ref 8–23)
CALCIUM SERPL-MCNC: 10 MG/DL (ref 8.6–10.4)
CHLORIDE SERPL-SCNC: 102 MMOL/L (ref 98–107)
CO2 SERPL-SCNC: 24 MMOL/L (ref 20–31)
CREAT SERPL-MCNC: 1.8 MG/DL (ref 0.5–0.9)
GFR SERPL CREATININE-BSD FRML MDRD: 30 ML/MIN/1.73M2
GLUCOSE SERPL-MCNC: 95 MG/DL (ref 70–99)
POTASSIUM SERPL-SCNC: 4.4 MMOL/L (ref 3.7–5.3)
SODIUM SERPL-SCNC: 137 MMOL/L (ref 135–144)

## 2023-10-06 PROCEDURE — 36415 COLL VENOUS BLD VENIPUNCTURE: CPT

## 2023-10-06 PROCEDURE — 87077 CULTURE AEROBIC IDENTIFY: CPT

## 2023-10-06 PROCEDURE — 87086 URINE CULTURE/COLONY COUNT: CPT

## 2023-10-06 PROCEDURE — 80048 BASIC METABOLIC PNL TOTAL CA: CPT

## 2023-10-06 PROCEDURE — 87186 SC STD MICRODIL/AGAR DIL: CPT

## 2023-10-08 LAB
MICROORGANISM SPEC CULT: ABNORMAL
SPECIMEN DESCRIPTION: ABNORMAL

## 2023-11-01 ENCOUNTER — HOSPITAL ENCOUNTER (OUTPATIENT)
Dept: WOMENS IMAGING | Age: 68
Discharge: HOME | End: 2023-11-03
Payer: MEDICARE

## 2023-11-01 ENCOUNTER — HOSPITAL ENCOUNTER (OUTPATIENT)
Age: 68
Discharge: HOME | End: 2023-11-01
Payer: MEDICARE

## 2023-11-01 DIAGNOSIS — N18.4 BENIGN HYPERTENSION WITH CKD (CHRONIC KIDNEY DISEASE) STAGE IV (HCC): ICD-10-CM

## 2023-11-01 DIAGNOSIS — N18.4 CKD (CHRONIC KIDNEY DISEASE), STAGE IV (HCC): ICD-10-CM

## 2023-11-01 DIAGNOSIS — I12.9 BENIGN HYPERTENSION WITH CKD (CHRONIC KIDNEY DISEASE) STAGE IV (HCC): ICD-10-CM

## 2023-11-01 DIAGNOSIS — E13.22 SECONDARY DIABETES MELLITUS WITH STAGE 4 CHRONIC KIDNEY DISEASE (HCC): ICD-10-CM

## 2023-11-01 DIAGNOSIS — N18.4 SECONDARY DIABETES MELLITUS WITH STAGE 4 CHRONIC KIDNEY DISEASE (HCC): ICD-10-CM

## 2023-11-01 DIAGNOSIS — R82.998 LEUKOCYTES IN URINE: ICD-10-CM

## 2023-11-01 DIAGNOSIS — Z12.31 ENCOUNTER FOR SCREENING MAMMOGRAM FOR BREAST CANCER: ICD-10-CM

## 2023-11-01 LAB
ANION GAP SERPL CALCULATED.3IONS-SCNC: 9 MMOL/L (ref 9–17)
BUN SERPL-MCNC: 25 MG/DL (ref 8–23)
CALCIUM SERPL-MCNC: 10.1 MG/DL (ref 8.6–10.4)
CHLORIDE SERPL-SCNC: 99 MMOL/L (ref 98–107)
CO2 SERPL-SCNC: 27 MMOL/L (ref 20–31)
CREAT SERPL-MCNC: 2.3 MG/DL (ref 0.5–0.9)
GFR SERPL CREATININE-BSD FRML MDRD: 23 ML/MIN/1.73M2
GLUCOSE SERPL-MCNC: 89 MG/DL (ref 70–99)
POTASSIUM SERPL-SCNC: 4.1 MMOL/L (ref 3.7–5.3)
SODIUM SERPL-SCNC: 135 MMOL/L (ref 135–144)

## 2023-11-01 PROCEDURE — 80048 BASIC METABOLIC PNL TOTAL CA: CPT

## 2023-11-01 PROCEDURE — 77063 BREAST TOMOSYNTHESIS BI: CPT

## 2023-11-01 PROCEDURE — 36415 COLL VENOUS BLD VENIPUNCTURE: CPT

## 2023-12-19 PROBLEM — G47.33 OSA ON CPAP: Status: ACTIVE | Noted: 2023-10-30

## 2023-12-19 PROBLEM — R94.2 RESTRICTIVE PATTERN PRESENT ON PULMONARY FUNCTION TESTING: Status: ACTIVE | Noted: 2023-10-30

## 2023-12-19 PROBLEM — J98.09 BRONCHOSPASTIC AIRWAY DISEASE: Status: ACTIVE | Noted: 2023-10-30

## 2024-01-11 ENCOUNTER — HOSPITAL ENCOUNTER (OUTPATIENT)
Dept: HOSPITAL 95 - PLD | Age: 69
End: 2024-01-11
Attending: DERMATOLOGY
Payer: COMMERCIAL

## 2024-01-11 DIAGNOSIS — L57.0: Primary | ICD-10-CM

## 2024-01-31 ENCOUNTER — TRANSCRIBE ORDERS (OUTPATIENT)
Dept: ADMINISTRATIVE | Age: 69
End: 2024-01-31

## 2024-01-31 DIAGNOSIS — E06.3 CHRONIC LYMPHOCYTIC THYROIDITIS: Primary | ICD-10-CM

## 2024-02-07 ENCOUNTER — HOSPITAL ENCOUNTER (OUTPATIENT)
Age: 69
Discharge: HOME | End: 2024-02-07
Payer: MEDICARE

## 2024-02-07 DIAGNOSIS — N18.4 SECONDARY DIABETES MELLITUS WITH STAGE 4 CHRONIC KIDNEY DISEASE (HCC): ICD-10-CM

## 2024-02-07 DIAGNOSIS — I12.9 BENIGN HYPERTENSION WITH CKD (CHRONIC KIDNEY DISEASE) STAGE IV (HCC): ICD-10-CM

## 2024-02-07 DIAGNOSIS — E06.3 CHRONIC LYMPHOCYTIC THYROIDITIS: ICD-10-CM

## 2024-02-07 DIAGNOSIS — E13.22 SECONDARY DIABETES MELLITUS WITH STAGE 4 CHRONIC KIDNEY DISEASE (HCC): ICD-10-CM

## 2024-02-07 DIAGNOSIS — N18.4 BENIGN HYPERTENSION WITH CKD (CHRONIC KIDNEY DISEASE) STAGE IV (HCC): ICD-10-CM

## 2024-02-07 DIAGNOSIS — N18.4 CKD (CHRONIC KIDNEY DISEASE), STAGE IV (HCC): ICD-10-CM

## 2024-02-07 LAB
ANION GAP SERPL CALCULATED.3IONS-SCNC: 11 MMOL/L (ref 9–17)
BUN SERPL-MCNC: 19 MG/DL (ref 8–23)
CALCIUM SERPL-MCNC: 9.6 MG/DL (ref 8.6–10.4)
CHLORIDE SERPL-SCNC: 105 MMOL/L (ref 98–107)
CO2 SERPL-SCNC: 23 MMOL/L (ref 20–31)
CREAT SERPL-MCNC: 2 MG/DL (ref 0.5–0.9)
GFR SERPL CREATININE-BSD FRML MDRD: 27 ML/MIN/1.73M2
GLUCOSE SERPL-MCNC: 112 MG/DL (ref 70–99)
POTASSIUM SERPL-SCNC: 4.2 MMOL/L (ref 3.7–5.3)
SODIUM SERPL-SCNC: 139 MMOL/L (ref 135–144)
T3FREE SERPL-MCNC: 2.79 PG/ML (ref 2.02–4.43)
T4 FREE SERPL-MCNC: 1.5 NG/DL (ref 0.9–1.7)
TSH SERPL DL<=0.05 MIU/L-ACNC: 3.7 UIU/ML (ref 0.3–5)

## 2024-02-07 PROCEDURE — 84439 ASSAY OF FREE THYROXINE: CPT

## 2024-02-07 PROCEDURE — 84481 FREE ASSAY (FT-3): CPT

## 2024-02-07 PROCEDURE — 84443 ASSAY THYROID STIM HORMONE: CPT

## 2024-02-07 PROCEDURE — 36415 COLL VENOUS BLD VENIPUNCTURE: CPT

## 2024-02-07 PROCEDURE — 80048 BASIC METABOLIC PNL TOTAL CA: CPT

## 2024-02-09 ENCOUNTER — TELEPHONE (OUTPATIENT)
Dept: GASTROENTEROLOGY | Age: 69
End: 2024-02-09

## 2024-02-09 NOTE — TELEPHONE ENCOUNTER
Writer called pt and LVM canc appt on 3/26/24 as the provider is on call, and please call the office to tita.

## 2024-02-12 ENCOUNTER — TELEPHONE (OUTPATIENT)
Dept: GASTROENTEROLOGY | Age: 69
End: 2024-02-12

## 2024-02-13 NOTE — TELEPHONE ENCOUNTER
Paperwork has been filled out for [employer] assistance program, writer called pt no answer lvm adv pt this has been filled out and will be faxed over. Scanned into chart

## 2024-02-23 ENCOUNTER — HOSPITAL ENCOUNTER (OUTPATIENT)
Dept: ULTRASOUND IMAGING | Age: 69
End: 2024-02-23
Attending: INTERNAL MEDICINE
Payer: MEDICARE

## 2024-02-23 DIAGNOSIS — E06.3 CHRONIC LYMPHOCYTIC THYROIDITIS: ICD-10-CM

## 2024-02-23 PROCEDURE — 76536 US EXAM OF HEAD AND NECK: CPT

## 2024-07-25 ENCOUNTER — HOSPITAL ENCOUNTER (OUTPATIENT)
Age: 69
Setting detail: SPECIMEN
Discharge: HOME | End: 2024-07-25
Payer: MEDICARE

## 2024-07-25 LAB
CREAT SERPL-MCNC: 2 MG/DL (ref 0.5–0.9)
EST. AVERAGE GLUCOSE BLD GHB EST-MCNC: 117 MG/DL
GFR, ESTIMATED: 27 ML/MIN/1.73M2
HBA1C MFR BLD: 5.7 % (ref 4–6)

## 2024-07-25 PROCEDURE — 82043 UR ALBUMIN QUANTITATIVE: CPT

## 2024-07-25 PROCEDURE — 82570 ASSAY OF URINE CREATININE: CPT

## 2024-07-25 PROCEDURE — 83036 HEMOGLOBIN GLYCOSYLATED A1C: CPT

## 2024-07-25 PROCEDURE — 82565 ASSAY OF CREATININE: CPT

## 2024-07-26 LAB
CREAT UR-MCNC: 197 MG/DL (ref 28–217)
MICROALBUMIN UR-MCNC: 53 MG/L (ref 0–20)
MICROALBUMIN/CREAT UR-RTO: 27 MCG/MG CREAT (ref 0–25)

## 2024-09-05 PROBLEM — R26.89 IMPAIRMENT OF BALANCE: Status: ACTIVE | Noted: 2023-12-28

## 2024-09-05 PROBLEM — Z20.822 EXPOSURE TO SEVERE ACUTE RESPIRATORY SYNDROME CORONAVIRUS 2 (SARS-COV-2): Status: ACTIVE | Noted: 2024-01-02

## 2024-09-05 PROBLEM — J84.9 INTERSTITIAL LUNG DISEASE (HCC): Status: ACTIVE | Noted: 2024-03-06

## 2024-09-05 PROBLEM — M79.602 CHRONIC PAIN OF BOTH UPPER EXTREMITIES: Status: ACTIVE | Noted: 2024-06-11

## 2024-09-05 PROBLEM — K27.9 PEPTIC ULCER: Status: ACTIVE | Noted: 2024-09-05

## 2024-09-05 PROBLEM — M79.601 CHRONIC PAIN OF BOTH UPPER EXTREMITIES: Status: ACTIVE | Noted: 2024-06-11

## 2024-09-05 PROBLEM — Z86.39 HISTORY OF ELEVATED LIPIDS: Status: ACTIVE | Noted: 2022-07-08

## 2024-09-05 PROBLEM — R50.9 FEVER: Status: ACTIVE | Noted: 2024-01-02

## 2024-09-05 PROBLEM — M41.9 SCOLIOSIS OF LUMBAR SPINE: Status: ACTIVE | Noted: 2023-12-28

## 2024-09-05 PROBLEM — H69.91 DYSFUNCTION OF RIGHT EUSTACHIAN TUBE: Status: ACTIVE | Noted: 2024-09-05

## 2024-09-05 PROBLEM — J01.00 ACUTE MAXILLARY SINUSITIS: Status: ACTIVE | Noted: 2024-09-05

## 2024-09-05 PROBLEM — G89.29 CHRONIC PAIN OF BOTH UPPER EXTREMITIES: Status: ACTIVE | Noted: 2024-06-11

## 2024-09-05 PROBLEM — K14.6 BURNING MOUTH SYNDROME: Status: ACTIVE | Noted: 2024-09-05

## 2024-09-05 PROBLEM — F19.21 HISTORY OF SUBSTANCE DEPENDENCE (HCC): Status: ACTIVE | Noted: 2022-07-08

## 2025-01-27 ENCOUNTER — HOSPITAL ENCOUNTER (OUTPATIENT)
Age: 70
Discharge: HOME | End: 2025-01-27
Payer: MEDICARE

## 2025-01-27 DIAGNOSIS — N18.4 BENIGN HYPERTENSION WITH CKD (CHRONIC KIDNEY DISEASE) STAGE IV (HCC): ICD-10-CM

## 2025-01-27 DIAGNOSIS — Z13.21 ENCOUNTER FOR VITAMIN DEFICIENCY SCREENING: ICD-10-CM

## 2025-01-27 DIAGNOSIS — N18.4 CKD (CHRONIC KIDNEY DISEASE), STAGE IV (HCC): ICD-10-CM

## 2025-01-27 DIAGNOSIS — I12.9 BENIGN HYPERTENSION WITH CKD (CHRONIC KIDNEY DISEASE) STAGE IV (HCC): ICD-10-CM

## 2025-01-27 DIAGNOSIS — N18.4 SECONDARY DIABETES MELLITUS WITH STAGE 4 CHRONIC KIDNEY DISEASE (HCC): ICD-10-CM

## 2025-01-27 DIAGNOSIS — E13.22 SECONDARY DIABETES MELLITUS WITH STAGE 4 CHRONIC KIDNEY DISEASE (HCC): ICD-10-CM

## 2025-01-27 LAB
25(OH)D3 SERPL-MCNC: 36.1 NG/ML (ref 30–100)
ANION GAP SERPL CALCULATED.3IONS-SCNC: 11 MMOL/L (ref 9–16)
BACTERIA URNS QL MICRO: ABNORMAL
BILIRUB UR QL STRIP: NEGATIVE
BUN SERPL-MCNC: 19 MG/DL (ref 8–23)
CALCIUM SERPL-MCNC: 9.8 MG/DL (ref 8.6–10.4)
CASTS #/AREA URNS LPF: ABNORMAL /LPF
CASTS #/AREA URNS LPF: ABNORMAL /LPF
CHLORIDE SERPL-SCNC: 104 MMOL/L (ref 98–107)
CLARITY UR: ABNORMAL
CO2 SERPL-SCNC: 25 MMOL/L (ref 20–31)
COLOR UR: ABNORMAL
CREAT SERPL-MCNC: 2.2 MG/DL (ref 0.7–1.2)
CREAT UR-MCNC: 155 MG/DL (ref 28–217)
CRYSTALS URNS MICRO: ABNORMAL /HPF
CRYSTALS URNS MICRO: ABNORMAL /HPF
EPI CELLS #/AREA URNS HPF: ABNORMAL /HPF
GFR, ESTIMATED: 24 ML/MIN/1.73M2
GLUCOSE UR STRIP-MCNC: NEGATIVE MG/DL
HCT VFR BLD AUTO: 38.9 % (ref 36–46)
HGB BLD-MCNC: 13.1 G/DL (ref 12–16)
HGB UR QL STRIP.AUTO: NEGATIVE
KETONES UR STRIP-MCNC: ABNORMAL MG/DL
LEUKOCYTE ESTERASE UR QL STRIP: ABNORMAL
MICROALBUMIN UR-MCNC: 17 MG/L (ref 0–20)
MICROALBUMIN/CREAT UR-RTO: 11 MCG/MG CREAT (ref 0–25)
NITRITE UR QL STRIP: NEGATIVE
PH UR STRIP: 5 [PH] (ref 5–8)
PHOSPHATE SERPL-MCNC: 3.2 MG/DL (ref 2.5–4.5)
POTASSIUM SERPL-SCNC: 3.8 MMOL/L (ref 3.7–5.3)
PROT UR STRIP-MCNC: NEGATIVE MG/DL
RBC #/AREA URNS HPF: ABNORMAL /HPF
SODIUM SERPL-SCNC: 140 MMOL/L (ref 136–145)
SP GR UR STRIP: 1.01 (ref 1–1.03)
UROBILINOGEN UR STRIP-ACNC: NORMAL EU/DL (ref 0–1)
WBC #/AREA URNS HPF: ABNORMAL /HPF

## 2025-01-27 PROCEDURE — 84520 ASSAY OF UREA NITROGEN: CPT

## 2025-01-27 PROCEDURE — 85014 HEMATOCRIT: CPT

## 2025-01-27 PROCEDURE — 82565 ASSAY OF CREATININE: CPT

## 2025-01-27 PROCEDURE — 81001 URINALYSIS AUTO W/SCOPE: CPT

## 2025-01-27 PROCEDURE — 82043 UR ALBUMIN QUANTITATIVE: CPT

## 2025-01-27 PROCEDURE — 84100 ASSAY OF PHOSPHORUS: CPT

## 2025-01-27 PROCEDURE — 82306 VITAMIN D 25 HYDROXY: CPT

## 2025-01-27 PROCEDURE — 36415 COLL VENOUS BLD VENIPUNCTURE: CPT

## 2025-01-27 PROCEDURE — 82570 ASSAY OF URINE CREATININE: CPT

## 2025-01-27 PROCEDURE — 80051 ELECTROLYTE PANEL: CPT

## 2025-01-27 PROCEDURE — 82310 ASSAY OF CALCIUM: CPT

## 2025-01-27 PROCEDURE — 85018 HEMOGLOBIN: CPT

## 2025-01-28 PROBLEM — F43.23 ADJUSTMENT DISORDER WITH MIXED ANXIETY AND DEPRESSED MOOD: Status: ACTIVE | Noted: 2025-01-28

## 2025-01-28 PROBLEM — R06.09 DYSPNEA ON EXERTION: Status: ACTIVE | Noted: 2025-01-28

## 2025-05-06 ENCOUNTER — HOSPITAL ENCOUNTER (OUTPATIENT)
Dept: HOSPITAL 95 - LAB SHORT | Age: 70
Discharge: HOME | End: 2025-05-06
Attending: FAMILY MEDICINE
Payer: MEDICARE

## 2025-05-06 DIAGNOSIS — K58.0: Primary | ICD-10-CM

## 2025-05-06 LAB
ALBUMIN SERPL BCP-MCNC: 3.5 G/DL (ref 3.4–5)
ALBUMIN/GLOB SERPL: 0.9 {RATIO} (ref 0.8–1.8)
BASOPHILS # BLD AUTO: 0.04 K/MM3 (ref 0–0.23)
BASOPHILS NFR BLD AUTO: 1 % (ref 0–2)
BILIRUB SERPL-MCNC: 0.4 MG/DL (ref 0.1–1)
CALCIUM SERPL-MCNC: 9.2 MG/DL (ref 8.5–10.1)
CREAT SERPL-MCNC: 0.93 MG/DL (ref 0.4–1)
DEPRECATED RDW RBC AUTO: 44.5 FL (ref 35.1–46.3)
EOSINOPHIL # BLD AUTO: 0.06 K/MM3 (ref 0–0.68)
EOSINOPHIL NFR BLD AUTO: 1 % (ref 0–6)
ERYTHROCYTE [DISTWIDTH] IN BLOOD BY AUTOMATED COUNT: 12.9 % (ref 11.7–14.2)
HCT VFR BLD AUTO: 44.6 % (ref 33–51)
HGB BLD-MCNC: 14.8 G/DL (ref 11.5–16)
IMM GRANULOCYTES # BLD AUTO: 0.02 K/MM3 (ref 0–0.1)
IMM GRANULOCYTES NFR BLD AUTO: 0 % (ref 0–1)
LYMPHOCYTES # BLD AUTO: 1.26 K/MM3 (ref 0.84–5.2)
LYMPHOCYTES NFR BLD AUTO: 26 % (ref 21–46)
MAGNESIUM SERPL-MCNC: 1.9 MG/DL (ref 1.6–2.4)
MCHC RBC AUTO-ENTMCNC: 33.2 G/DL (ref 31.5–36.5)
MCV RBC AUTO: 94 FL (ref 80–100)
MONOCYTES # BLD AUTO: 0.42 K/MM3 (ref 0.16–1.47)
MONOCYTES NFR BLD AUTO: 9 % (ref 4–13)
NEUTROPHILS # BLD AUTO: 3.09 K/MM3 (ref 1.96–9.15)
NEUTROPHILS NFR BLD AUTO: 63 % (ref 41–73)
PLATELET # BLD AUTO: 262 K/MM3 (ref 150–400)
POTASSIUM SERPL-SCNC: 3.8 MMOL/L (ref 3.5–5.5)
PROT SERPL-MCNC: 7.5 G/DL (ref 6.4–8.2)

## 2025-05-15 ENCOUNTER — HOSPITAL ENCOUNTER (OUTPATIENT)
Dept: HOSPITAL 95 - LAB SHORT | Age: 70
End: 2025-05-15
Attending: STUDENT IN AN ORGANIZED HEALTH CARE EDUCATION/TRAINING PROGRAM
Payer: MEDICARE

## 2025-05-15 DIAGNOSIS — R19.7: Primary | ICD-10-CM

## 2025-06-18 ENCOUNTER — HOSPITAL ENCOUNTER (OUTPATIENT)
Age: 70
Discharge: HOME | End: 2025-06-18
Payer: MEDICARE

## 2025-06-18 DIAGNOSIS — N18.4 BENIGN HYPERTENSION WITH CKD (CHRONIC KIDNEY DISEASE) STAGE IV (HCC): ICD-10-CM

## 2025-06-18 DIAGNOSIS — E13.22 SECONDARY DIABETES MELLITUS WITH STAGE 4 CHRONIC KIDNEY DISEASE (HCC): ICD-10-CM

## 2025-06-18 DIAGNOSIS — N18.4 CKD (CHRONIC KIDNEY DISEASE), STAGE IV (HCC): ICD-10-CM

## 2025-06-18 DIAGNOSIS — R39.9 UTI SYMPTOMS: ICD-10-CM

## 2025-06-18 DIAGNOSIS — N18.4 SECONDARY DIABETES MELLITUS WITH STAGE 4 CHRONIC KIDNEY DISEASE (HCC): ICD-10-CM

## 2025-06-18 DIAGNOSIS — I12.9 BENIGN HYPERTENSION WITH CKD (CHRONIC KIDNEY DISEASE) STAGE IV (HCC): ICD-10-CM

## 2025-06-18 DIAGNOSIS — R82.998 LEUKOCYTES IN URINE: ICD-10-CM

## 2025-06-18 LAB
ANION GAP SERPL CALCULATED.3IONS-SCNC: 13 MMOL/L (ref 9–16)
BACTERIA URNS QL MICRO: ABNORMAL
BASOPHILS # BLD: 0.07 K/UL (ref 0–0.2)
BASOPHILS NFR BLD: 1 % (ref 0–2)
BILIRUB UR QL STRIP: NEGATIVE
BUN SERPL-MCNC: 19 MG/DL (ref 8–23)
CALCIUM SERPL-MCNC: 9.7 MG/DL (ref 8.6–10.4)
CASTS #/AREA URNS LPF: ABNORMAL /LPF
CHLORIDE SERPL-SCNC: 105 MMOL/L (ref 98–107)
CLARITY UR: ABNORMAL
CO2 SERPL-SCNC: 23 MMOL/L (ref 20–31)
COLOR UR: YELLOW
CREAT SERPL-MCNC: 1.8 MG/DL (ref 0.7–1.2)
EOSINOPHIL # BLD: 0.44 K/UL (ref 0–0.44)
EOSINOPHILS RELATIVE PERCENT: 4 % (ref 0–4)
EPI CELLS #/AREA URNS HPF: ABNORMAL /HPF
ERYTHROCYTE [DISTWIDTH] IN BLOOD BY AUTOMATED COUNT: 13.7 % (ref 11.5–14.9)
GFR, ESTIMATED: 30 ML/MIN/1.73M2
GLUCOSE SERPL-MCNC: 104 MG/DL (ref 74–99)
GLUCOSE UR STRIP-MCNC: NEGATIVE MG/DL
HCT VFR BLD AUTO: 38.4 % (ref 36–46)
HGB BLD-MCNC: 12.9 G/DL (ref 12–16)
HGB UR QL STRIP.AUTO: ABNORMAL
IMM GRANULOCYTES # BLD AUTO: 0.05 K/UL (ref 0–0.3)
IMM GRANULOCYTES NFR BLD: 1 %
KETONES UR STRIP-MCNC: NEGATIVE MG/DL
LEUKOCYTE ESTERASE UR QL STRIP: ABNORMAL
LYMPHOCYTES NFR BLD: 1.58 K/UL (ref 1.1–3.7)
LYMPHOCYTES RELATIVE PERCENT: 15 % (ref 24–44)
MAGNESIUM SERPL-MCNC: 1.9 MG/DL (ref 1.6–2.4)
MCH RBC QN AUTO: 32.2 PG (ref 26–34)
MCHC RBC AUTO-ENTMCNC: 33.6 G/DL (ref 31–37)
MCV RBC AUTO: 95.8 FL (ref 80–100)
MONOCYTES NFR BLD: 0.69 K/UL (ref 0.1–1.2)
MONOCYTES NFR BLD: 6 % (ref 3–12)
NEUTROPHILS NFR BLD: 73 % (ref 36–66)
NEUTS SEG NFR BLD: 7.95 K/UL (ref 1.5–8.1)
NITRITE UR QL STRIP: NEGATIVE
NRBC BLD-RTO: 0 PER 100 WBC
PH UR STRIP: 6.5 [PH] (ref 5–8)
PHOSPHATE SERPL-MCNC: 3.6 MG/DL (ref 2.5–4.5)
PLATELET # BLD AUTO: 174 K/UL (ref 150–450)
PMV BLD AUTO: 10.4 FL (ref 8–13.5)
POTASSIUM SERPL-SCNC: 4.8 MMOL/L (ref 3.7–5.3)
PROT UR STRIP-MCNC: NEGATIVE MG/DL
RBC # BLD AUTO: 4.01 M/UL (ref 3.95–5.11)
RBC #/AREA URNS HPF: ABNORMAL /HPF
SODIUM SERPL-SCNC: 141 MMOL/L (ref 136–145)
SP GR UR STRIP: 1.01 (ref 1–1.03)
UROBILINOGEN UR STRIP-ACNC: NORMAL EU/DL (ref 0–1)
WBC #/AREA URNS HPF: ABNORMAL /HPF
WBC OTHER # BLD: 10.8 K/UL (ref 3.5–11)

## 2025-06-18 PROCEDURE — 80048 BASIC METABOLIC PNL TOTAL CA: CPT

## 2025-06-18 PROCEDURE — 84100 ASSAY OF PHOSPHORUS: CPT

## 2025-06-18 PROCEDURE — 87186 SC STD MICRODIL/AGAR DIL: CPT

## 2025-06-18 PROCEDURE — 36415 COLL VENOUS BLD VENIPUNCTURE: CPT

## 2025-06-18 PROCEDURE — 85025 COMPLETE CBC W/AUTO DIFF WBC: CPT

## 2025-06-18 PROCEDURE — 81001 URINALYSIS AUTO W/SCOPE: CPT

## 2025-06-18 PROCEDURE — 83735 ASSAY OF MAGNESIUM: CPT

## 2025-06-18 PROCEDURE — 87086 URINE CULTURE/COLONY COUNT: CPT

## 2025-06-18 PROCEDURE — 87088 URINE BACTERIA CULTURE: CPT

## 2025-06-19 LAB
MICROORGANISM SPEC CULT: ABNORMAL
SERVICE CMNT-IMP: ABNORMAL
SPECIMEN DESCRIPTION: ABNORMAL

## (undated) DEVICE — SET ENDOSCP SEAL HYSTEROSCOPE RIG OUTFLO CHN DISP MYOSURE

## (undated) DEVICE — CYSTO/BLADDER IRRIGATION SET, REGULATING CLAMP

## (undated) DEVICE — 1200CC GUARDIAN II: Brand: GUARDIAN

## (undated) DEVICE — 3000ML,PRESSURE INFUSER W/STOPCOCK: Brand: MEDLINE

## (undated) DEVICE — FORCEPS BX L240CM JAW DIA2.8MM L CAP W/ NDL MIC MESH TOOTH

## (undated) DEVICE — SOLUTION PROC 3000ML 09% SOD CHL PLAS CONTAINER VIAFLX

## (undated) DEVICE — ST CHARLES PERI-GYN PACK: Brand: MEDLINE INDUSTRIES, INC.

## (undated) DEVICE — GLOVE ORANGE PI 8   MSG9080

## (undated) DEVICE — Z INACTIVE USE 2660664 SOLUTION IRRIG 3000ML 0.9% SOD CHL USP UROMATIC PLAS CONT

## (undated) DEVICE — GLOVE SURG SZ 8 L12IN FNGR THK75MIL WHT LTX POLYMER BEAD

## (undated) DEVICE — DEFENDO AIR WATER SUCTION AND BIOPSY VALVE KIT FOR  OLYMPUS: Brand: DEFENDO AIR/WATER/SUCTION AND BIOPSY VALVE

## (undated) DEVICE — SOLUTION IV IRRIG WATER 1000ML POUR BRL 2F7114

## (undated) DEVICE — GOWN SURGICALXL REUSE REPROC

## (undated) DEVICE — STRAP,POSITIONING,KNEE/BODY,FOAM,4X60": Brand: MEDLINE

## (undated) DEVICE — DBD-3000ML,PRESSURE INFUSER W/STOPCOCK: Brand: MEDLINE

## (undated) DEVICE — MEDI-VAC YANKAUER SUCTION HANDLE W/BULBOUS TIP: Brand: CARDINAL HEALTH

## (undated) DEVICE — ENDO KIT W/SYRINGE: Brand: MEDLINE INDUSTRIES, INC.

## (undated) DEVICE — GLOVE ORTHO 8   MSG9480

## (undated) DEVICE — GOWN,SIRUS,NONRNF,SETINSLV,XL,20/CS: Brand: MEDLINE

## (undated) DEVICE — 500ML,PRESSURE INFUSER W/STOPCOCK: Brand: MEDLINE

## (undated) DEVICE — GOWN,AURORA,NONREINFORCED,LARGE: Brand: MEDLINE

## (undated) DEVICE — Device: Brand: LEVEL 1

## (undated) DEVICE — Device: Brand: NAKAO QUICKTRAP

## (undated) DEVICE — NO USE 18 MONTHS GOWN STD LG  1153D

## (undated) DEVICE — BITEBLOCK 54FR W/ DENT RIM BLOX

## (undated) DEVICE — GOWN,POLY REINFORCED,LG: Brand: MEDLINE

## (undated) DEVICE — DEVICE TISS REMOVING 17OZ L25.25IN DIA3MM INTUTER CTRL UNIT

## (undated) DEVICE — SINGLE PORT MANIFOLD: Brand: NEPTUNE 2

## (undated) DEVICE — AIRWAY LARYN MASK CHILD SZ 4 30ML CUF FOR 50-70KG PT STD